# Patient Record
Sex: FEMALE | Race: WHITE | Employment: FULL TIME | URBAN - METROPOLITAN AREA
[De-identification: names, ages, dates, MRNs, and addresses within clinical notes are randomized per-mention and may not be internally consistent; named-entity substitution may affect disease eponyms.]

---

## 2017-01-04 ENCOUNTER — OFFICE VISIT (OUTPATIENT)
Dept: OBGYN | Facility: CLINIC | Age: 32
End: 2017-01-04
Attending: ADVANCED PRACTICE MIDWIFE
Payer: COMMERCIAL

## 2017-01-04 VITALS
DIASTOLIC BLOOD PRESSURE: 85 MMHG | SYSTOLIC BLOOD PRESSURE: 124 MMHG | WEIGHT: 162.6 LBS | HEIGHT: 63 IN | BODY MASS INDEX: 28.81 KG/M2

## 2017-01-04 DIAGNOSIS — Z30.41 ENCOUNTER FOR SURVEILLANCE OF CONTRACEPTIVE PILLS: ICD-10-CM

## 2017-01-04 DIAGNOSIS — Z12.4 SCREENING FOR MALIGNANT NEOPLASM OF CERVIX: ICD-10-CM

## 2017-01-04 DIAGNOSIS — Z00.00 ROUTINE GENERAL MEDICAL EXAMINATION AT A HEALTH CARE FACILITY: ICD-10-CM

## 2017-01-04 DIAGNOSIS — Z00.00 VISIT FOR PREVENTIVE HEALTH EXAMINATION: Primary | ICD-10-CM

## 2017-01-04 DIAGNOSIS — Z01.419 ENCOUNTER FOR GYNECOLOGICAL EXAMINATION WITHOUT ABNORMAL FINDING: ICD-10-CM

## 2017-01-04 PROCEDURE — G0145 SCR C/V CYTO,THINLAYER,RESCR: HCPCS | Performed by: ADVANCED PRACTICE MIDWIFE

## 2017-01-04 PROCEDURE — 87624 HPV HI-RISK TYP POOLED RSLT: CPT | Performed by: ADVANCED PRACTICE MIDWIFE

## 2017-01-04 PROCEDURE — G0476 HPV COMBO ASSAY CA SCREEN: HCPCS | Performed by: ADVANCED PRACTICE MIDWIFE

## 2017-01-04 PROCEDURE — 99211 OFF/OP EST MAY X REQ PHY/QHP: CPT | Mod: ZF

## 2017-01-04 RX ORDER — NORGESTIMATE AND ETHINYL ESTRADIOL 0.25-0.035
1 KIT ORAL DAILY
Qty: 90 TABLET | Refills: 3 | Status: SHIPPED | OUTPATIENT
Start: 2017-01-04 | End: 2017-04-19

## 2017-01-04 ASSESSMENT — ENCOUNTER SYMPTOMS
DECREASED LIBIDO: 0
ORTHOPNEA: 0
RECTAL BLEEDING: 0
PANIC: 0
POLYPHAGIA: 0
NAIL CHANGES: 0
SPUTUM PRODUCTION: 0
BOWEL INCONTINENCE: 0
WEIGHT GAIN: 0
BRUISES/BLEEDS EASILY: 0
EYE REDNESS: 0
HALLUCINATIONS: 0
LOSS OF CONSCIOUSNESS: 0
WHEEZING: 0
JAUNDICE: 0
NECK MASS: 0
HEADACHES: 0
TINGLING: 0
WEIGHT LOSS: 0
EYE PAIN: 0
ARTHRALGIAS: 0
CLAUDICATION: 0
EXERCISE INTOLERANCE: 0
BLOATING: 0
DECREASED APPETITE: 0
LEG PAIN: 0
PARALYSIS: 0
HOT FLASHES: 0
DYSPNEA ON EXERTION: 0
HEMOPTYSIS: 0
SPEECH CHANGE: 0
MEMORY LOSS: 0
LEG SWELLING: 0
POOR WOUND HEALING: 0
ABDOMINAL PAIN: 0
EYE IRRITATION: 0
COUGH DISTURBING SLEEP: 0
RECTAL PAIN: 0
ALTERED TEMPERATURE REGULATION: 0
DISTURBANCES IN COORDINATION: 0
SNORES LOUDLY: 0
COUGH: 0
DYSURIA: 0
DIARRHEA: 0
DOUBLE VISION: 0
FATIGUE: 0
POLYDIPSIA: 0
SINUS PAIN: 0
INSOMNIA: 0
HOARSE VOICE: 0
MUSCLE CRAMPS: 0
RESPIRATORY PAIN: 0
NUMBNESS: 0
NERVOUS/ANXIOUS: 0
PALPITATIONS: 0
BACK PAIN: 0
DIZZINESS: 0
BREAST PAIN: 0
SINUS CONGESTION: 0
SORE THROAT: 0
DEPRESSION: 0
CONSTIPATION: 0
SLEEP DISTURBANCES DUE TO BREATHING: 0
NECK PAIN: 0
MYALGIAS: 0
CHILLS: 0
NIGHT SWEATS: 0
FEVER: 0
NAUSEA: 0
SEIZURES: 0
LIGHT-HEADEDNESS: 0
INCREASED ENERGY: 0
TACHYCARDIA: 0
TASTE DISTURBANCE: 0
BLOOD IN STOOL: 0
HEMATURIA: 0
WEAKNESS: 0
STIFFNESS: 0
SKIN CHANGES: 0
TROUBLE SWALLOWING: 0
VOMITING: 0
MUSCLE WEAKNESS: 0
BREAST MASS: 0
EXTREMITY NUMBNESS: 0
SMELL DISTURBANCE: 0
JOINT SWELLING: 0
DECREASED CONCENTRATION: 0
POSTURAL DYSPNEA: 0
SWOLLEN GLANDS: 0
TREMORS: 0
HYPOTENSION: 0
HYPERTENSION: 0
DIFFICULTY URINATING: 0
EYE WATERING: 0
HEARTBURN: 0
SYNCOPE: 0
FLANK PAIN: 0
SHORTNESS OF BREATH: 0

## 2017-01-04 ASSESSMENT — PAIN SCALES - GENERAL: PAINLEVEL: NO PAIN (0)

## 2017-01-04 NOTE — NURSING NOTE
Chief Complaint   Patient presents with     Annual Visit   annual exam  And needs refill bcp today  Will fill downstairs.

## 2017-01-04 NOTE — Clinical Note
2017       RE: Vianca Dumas  [address]  [place] [address]     Dear Colleague,    Thank you for referring your patient, Vianca Dumas, to the [facility] at [facility]. Please see a copy of my visit note below.        Progress Note    SUBJECTIVE:  Vianca Dumas is an 31 year old, , who requests an Annual Preventive Exam.       Concerns today include: preconception    Has ruptured ear drum, was evaluated at urgent care, pt feels it is improving.    Menstrual History:  Menstrual History 2014   LAST MENSTRUAL PERIOD 2014 - 2016   Menarche age - - -   Period Cycle (Days) - 28-36, skipped one menses -   Period Duration (Days) - 7-10 -   Method of Contraception - Combined oral contraceptive -   Period Pattern - Irregular -   Menstrual Flow - Light -   Menstrual Control - Other (Comment) -   Dysmenorrhea - Moderate -   PMS Symptoms - Cramping;Mood Changes -   Reviewed Today - Yes -       Last  PAP      NIL   10/1/2013  History of abnormal Pap smear: NO - age 30-65 PAP every 5 years with negative HPV co-testing recommended    Last No results found for this basename: HPV16  Last No results found for this basename: hpv18  Last No results found for this basename: hrhpv    Mammogram current: not applicable  Last Mammogram:   No results found.     Last Colonoscopy:  No results found for this or any previous visit.      HISTORY:  Prescription Medications as of 2017             norgestimate-ethinyl estradiol (ORTHO-CYCLEN, SPRINTEC) 0.25-35 MG-MCG per tablet Take 1 tablet by mouth daily    HERBALS         No Known Allergies  Immunization History   Administered Date(s) Administered     Hepatitis B 1997, 1998, 2002     Influenza (IIV3) 2013     Meningococcal (Menomune ) 2003     OPV 1984, 1984, 1985, 10/06/1988     TDAP (BOOSTRIX AGES 10-64)  2016     Tdap (Adacel,Boostrix) 2006       Obstetric History       T0      TAB0   SAB0   E0   M0   L0      Past Medical History   Diagnosis Date     Allergic rhinitis      Ruptured ear drum, right 2016     Past Surgical History   Procedure Laterality Date     Tonsillectomy       2002     Family History   Problem Relation Age of Onset     Thyroid Disease Maternal Grandmother      DANIEL Maternal Grandfather      Social History     Social History     Marital Status: Single     Spouse Name: N/A     Number of Children: N/A     Years of Education: college     Occupational History      [employer]     Social History Main Topics     Smoking status: Former Smoker -- 0.50 packs/day     Types: Cigarettes     Start date: 2016     Smokeless tobacco: Never Used     Alcohol Use: 0.6 - 1.8 oz/week     1-3 Standard drinks or equivalent per week     Drug Use: No     Sexual Activity:     Partners: Male     Birth Control/ Protection: Condom, OCP      Comment: monogamous relationship since May 2013     Other Topics Concern     Not on file     Social History Narrative    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     How much exercise per week? Not much daily    How much calcium per day? 2-3 servings       How much caffeine per day? 1 cup coffee    How much vitamin D per day? 0  occ supplements    Do you/your family wear seatbelts?  Yes    Do you/your family use safety helmets? N/A    Do you/your family use sunscreen? Yes    Do you/your family keep firearms in the home? No    Do you/your family have a smoke detector(s)? Yes        Do you feel safe in your home? Yes    Has anyone ever touched you in an unwanted manner? No     Explain         2014 Ravinder Kuhn LPN    Reviewed [email or web address] lpn 2017                Review of Systems     Constitutional:  Negative for fever, chills, weight loss, weight gain, fatigue, decreased appetite, night  "sweats, recent stressors, height gain, height loss, post-operative complications, incisional pain, hallucinations, increased energy, hyperactivity and confused.   HENT:  Positive for ear pain (not so much pain as \"stuffy\" feeling). Negative for hearing loss, tinnitus, nosebleeds, trouble swallowing, hoarse voice, mouth sores, sore throat, ear discharge, tooth pain, gum tenderness, taste disturbance, smell disturbance, hearing aid, bleeding gums, dry mouth, sinus pain, sinus congestion and neck mass.    Eyes:  Negative for double vision, pain, redness, eye pain, decreased vision, eye watering, eye bulging, eye dryness, flashing lights, spots, floaters, strabismus, tunnel vision, jaundice and eye irritation.   Respiratory:   Negative for cough, hemoptysis, sputum production, shortness of breath, wheezing, sleep disturbances due to breathing, snores loudly, respiratory pain, dyspnea on exertion, cough disturbing sleep and postural dyspnea.    Cardiovascular:  Negative for chest pain, dyspnea on exertion, palpitations, orthopnea, claudication, leg swelling, fingers/toes turn blue, hypertension, hypotension, syncope, history of heart murmur, chest pain on exertion, chest pain at rest, pacemaker, few scattered varicosities, leg pain, sleep disturbances due to breathing, tachycardia, light-headedness, exercise intolerance and edema.   Gastrointestinal:  Negative for heartburn, nausea, vomiting, abdominal pain, diarrhea, constipation, blood in stool, melena, rectal pain, bloating, hemorrhoids, bowel incontinence, jaundice, rectal bleeding, coffee ground emesis and change in stool.   Genitourinary:  Negative for bladder incontinence, dysuria, urgency, hematuria, flank pain, vaginal discharge, difficulty urinating, genital sores, dyspareunia, decreased libido, nocturia, voiding less frequently, arousal difficulty, abnormal vaginal bleeding, excessive menstruation, menstrual changes, hot flashes, vaginal dryness and " "postmenopausal bleeding.   Musculoskeletal:  Negative for myalgias, back pain, joint swelling, arthralgias, stiffness, muscle cramps, neck pain, bone pain, muscle weakness and fracture.   Skin:  Negative for nail changes, itching, poor wound healing, rash, hair changes, skin changes, acne, warts, poor wound healing, scarring, flaky skin, Raynaud's phenomenon, sensitivity to sunlight and skin thickening.   Neurological:  Negative for dizziness, tingling, tremors, speech change, seizures, loss of consciousness, weakness, light-headedness, numbness, headaches, disturbances in coordination, extremity numbness, memory loss, difficulty walking and paralysis.   Endo/Heme:  Negative for anemia, swollen glands and bruises/bleeds easily.   Psychiatric/Behavioral:  Negative for depression, hallucinations, memory loss, decreased concentration, mood swings and panic attacks.    Breast:  Negative for breast discharge, breast mass, breast pain and nipple retraction.   Endocrine:  Negative for altered temperature regulation, polyphagia, polydipsia, unwanted hair growth and change in facial hair.      EXAM:  Blood pressure 124/85, height 1.6 m (5' 3\"), weight 73.755 kg (162 lb 9.6 oz), last menstrual period 12/29/2016. Body mass index is 28.81 kg/(m^2).  General - pleasant female in no acute distress.  Skin - no suspicious lesions or rashes  EENT-  PERRLA, euthyroid with out palpable nodules  Neck - supple without lymphadenopathy.  Lungs - clear to auscultation bilaterally.  Heart - regular rate and rhythm without murmur.  Abdomen - soft, nontender, nondistended, no masses or organomegaly noted.  Musculoskeletal - no gross deformities.  Neurological - normal strength, sensation, and mental status.    Breast Exam:  Breast: Without visible skin changes. No dimpling or lesions seen.   Breasts supple, non-tender with palpation, no dominant mass, nodularity, or nipple discharge noted bilaterally. Axillary nodes negative.      Pelvic " Exam:  EG/BUS: Normal genital architecture without lesions, erythema or abnormal secretions Bartholin's, Urethra, Redwood's normal   Urethral meatus: normal   Urethra: no masses, tenderness, or scarring   Bladder: no masses or tenderness   Vagina: moist, pink, rugae with creamy, white and odorless  secretions  Cervix: pink, moist, closed, without lesion or CMT  Uterus: anteverted,  and small, smooth, firm, mobile w/o pain  Adnexa: Within normal limits and No masses, nodularity, tenderness  Rectum:anus normal       ASSESSMENT:  Encounter Diagnoses   Name Primary?     Routine general medical examination at a health care facility Yes     Encounter for surveillance of contraceptive pills      Visit for preventive health examination      Screening for malignant neoplasm of cervix      Encounter for gynecological examination without abnormal finding [Z01.419]         PLAN:   Orders Placed This Encounter   Procedures     Pelvic and Breast Exam Procedure []     Pap Smear Exam [] Do Not Remove     Pap imaged thin layer screen with HPV - recommended age 30 - 65 years (select HPV order below)     HPV High Risk Types DNA Cervical      Rx for birth control refilled.   Additional teaching done at this visit regarding calcium (1200 mg per day), exercise and preconception.  Encouraged folic acid, no alcohol/drugs/smoking, exercise, weight management.  If no pregnancy in 1 year of trying, RTC for infertility work up.  Declines STI testing.   Refer to primary care for rupture ear drum follow-up.     Return to clinic in one year.  Follow-up as needed.    Agrees to communication with Shankar.         Again, thank you for allowing me to participate in the care of your patient.      Sincerely,    AMANDA Barnes CNM

## 2017-01-04 NOTE — PATIENT INSTRUCTIONS
PREVENTIVE HEALTH RECOMMENDATIONS:     Preconception Health:    Take 0.4 mg Folic acid daily and 1000 to 1200 mg of Calcium daily.     Avoid alcohol, smoking, street drugs.    Maintain a healthy BMI and exercise at least 30 minutes daily.    Protect yourself from sexually transmitted infections.     Review any new medication, supplements with your health care provider.     Keep your immunizations up to date.  Remember to have yearly flu shots.    Know your medical history and your family history    Your male partner's health is important as well. Visit: http://www.cdc.gov/preconception/men.html    For more information on planning for a healthy pregnancy, visit:  http://www.cdc.gov/preconception/planning.html    Most women need a yearly breast and pelvic exam.    A PAP screen, a test done DURING a pelvic exam, is NO longer recommended yearly.    March 2013, screening guidelines recommended by ACOG for PAP screen are:    1) First pap at age 21.    2) Pap every 3 years until age 30.    3) After age 30, pap every 3 years or Pap with HR HPV screen every 5 years until age 65.  4) Women do NOT need a vaginal Pap screen after a hysterectomy (surgical removal of the uterus) when they have not had cancer.    Exceptions:  1) Yearly pap if HIV+ or immunosuppressed secondary to organ transplant  2) HELADIO II-III continue routine screening for 20 years.    I encourage you continue looking for opportunities to choose a healthy lifestyle:       * Choose to eat a heart healthy diet. Check out the FOOD PLATE guidelines at: http://www.choosemyplate.gov/ for helpful hints on weight and cholesterol management.  Balance your caloric intake with exercise to maintain a BMI in the 22 to 26 range. For bone health: Eat calcium-rich foods like yogurt, broccoli or take chewable calcium pills (500 to 600 mg) twice a day with food.       * Exercise for at least an average of 30 minutes a day, 5 days of the week. This will help you control your  weight, release stress, and help prevent disease.      * Take a Vitamin D3 supplement daily fall through spring and during summer unless you yyss75-72' full body sun exposure to skin without sunscreen.      * DO wear sunscreen to prevent skin cancer after the first 15-30 minutes.      * Identify stressors in your life, find ways to release the stress, and, make time for yourself. PLEASE ask for help if mood changes last longer than two weeks.     * Limit alcohol to one drink per day.  No smoking.  Avoid second hand smoke. If you smoke, ask for help to stop.       *  If you are in a sexual relationship, talk with your partner about possible infection risks and take action to protect yourself from exposure to a sexual infection.    Please request an infection screen for STIs (sexually transmitted infections) if you are less than age 26 OR believe that you may be at risk.     Get a flu shot each year. Get a tetanus shot every 10 years. EVERYONE needs a pertussis (Whooping cough) booster.    See your dentist twice a year for an exam and preventive care cleaning.     Consider the following screen tests:    1) cholesterol test every 5 years.     2) yearly mammogram after age 40 unless you have identified risks.    3) colonoscopy every 10 years after age 50 unless you have identified risks.    4) diabetes blood test screening if you are at risk for diabetes.      Additional information that you may also find helpful:  The Internet now gives us access to LOTS of information -- some of it helpful, research documented and also plenty of harmful, anecdotal information that may not pertain to your situtaion. Consider visiting the following websites for accurate health information:    www.vitamindcouncil.org/ : Info and ongoing research re Vitamin D    www.fairview.org : Up to date and easily searchable information on multiple topics.    www.medlineplus.gov : medication info, interactive tutorials, watch real surgeries  online    www.cdc.gov : public health info, travel advisories, epidemics (H1N1)    www.luis/std.org: current research re diagnosis, treatment and prevention of sexually contacted infections.    www.health.[facility].[place] : [place] dept of heatl, public health issues in [place], N1N1    www.familydoctor.org : good info from the Academy of Family Physicians

## 2017-01-04 NOTE — PROGRESS NOTES
Progress Note    SUBJECTIVE:  Vianca Dumas is an 31 year old, , who requests an Annual Preventive Exam.       Concerns today include: preconception    Has ruptured ear drum, was evaluated at urgent care, pt feels it is improving.    Menstrual History:  Menstrual History 2014   LAST MENSTRUAL PERIOD 2014 - 2016   Menarche age - - -   Period Cycle (Days) - 28-36, skipped one menses -   Period Duration (Days) - 7-10 -   Method of Contraception - Combined oral contraceptive -   Period Pattern - Irregular -   Menstrual Flow - Light -   Menstrual Control - Other (Comment) -   Dysmenorrhea - Moderate -   PMS Symptoms - Cramping;Mood Changes -   Reviewed Today - Yes -       Last  PAP      NIL   10/1/2013  History of abnormal Pap smear: NO - age 30-65 PAP every 5 years with negative HPV co-testing recommended    Last No results found for this basename: HPV16  Last No results found for this basename: hpv18  Last No results found for this basename: hrhpv    Mammogram current: not applicable  Last Mammogram:   No results found.     Last Colonoscopy:  No results found for this or any previous visit.      HISTORY:  Prescription Medications as of 2017             norgestimate-ethinyl estradiol (ORTHO-CYCLEN, SPRINTEC) 0.25-35 MG-MCG per tablet Take 1 tablet by mouth daily    HERBALS         No Known Allergies  Immunization History   Administered Date(s) Administered     Hepatitis B 1997, 1998, 2002     Influenza (IIV3) 2013     Meningococcal (Menomune ) 2003     OPV 1984, 1984, 1985, 10/06/1988     TDAP (BOOSTRIX AGES 10-64) 2016     Tdap (Adacel,Boostrix) 2006       Obstetric History       T0      TAB0   SAB0   E0   M0   L0      Past Medical History   Diagnosis Date     Allergic rhinitis      Ruptured ear drum, right 2016     Past Surgical History   Procedure Laterality Date     Tonsillectomy        "6/2002     Family History   Problem Relation Age of Onset     Thyroid Disease Maternal Grandmother      DANIEL Maternal Grandfather      Social History     Social History     Marital Status: Single     Spouse Name: N/A     Number of Children: N/A     Years of Education: college     Occupational History      [employer]     Social History Main Topics     Smoking status: Former Smoker -- 0.50 packs/day     Types: Cigarettes     Start date: 12/29/2016     Smokeless tobacco: Never Used     Alcohol Use: 0.6 - 1.8 oz/week     1-3 Standard drinks or equivalent per week     Drug Use: No     Sexual Activity:     Partners: Male     Birth Control/ Protection: Condom, OCP      Comment: monogamous relationship since May 2013     Other Topics Concern     Not on file     Social History Narrative    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     How much exercise per week? Not much daily    How much calcium per day? 2-3 servings       How much caffeine per day? 1 cup coffee    How much vitamin D per day? 0  occ supplements    Do you/your family wear seatbelts?  Yes    Do you/your family use safety helmets? N/A    Do you/your family use sunscreen? Yes    Do you/your family keep firearms in the home? No    Do you/your family have a smoke detector(s)? Yes        Do you feel safe in your home? Yes    Has anyone ever touched you in an unwanted manner? No     Explain         December 9, 2014 Ravinder Kuhn LPN    Reviewed [email or web address] lpn 1-4-2017                Review of Systems     Constitutional:  Negative for fever, chills, weight loss, weight gain, fatigue, decreased appetite, night sweats, recent stressors, height gain, height loss, post-operative complications, incisional pain, hallucinations, increased energy, hyperactivity and confused.   HENT:  Positive for ear pain (not so much pain as \"stuffy\" feeling). Negative for hearing loss, tinnitus, nosebleeds, trouble swallowing, " hoarse voice, mouth sores, sore throat, ear discharge, tooth pain, gum tenderness, taste disturbance, smell disturbance, hearing aid, bleeding gums, dry mouth, sinus pain, sinus congestion and neck mass.    Eyes:  Negative for double vision, pain, redness, eye pain, decreased vision, eye watering, eye bulging, eye dryness, flashing lights, spots, floaters, strabismus, tunnel vision, jaundice and eye irritation.   Respiratory:   Negative for cough, hemoptysis, sputum production, shortness of breath, wheezing, sleep disturbances due to breathing, snores loudly, respiratory pain, dyspnea on exertion, cough disturbing sleep and postural dyspnea.    Cardiovascular:  Negative for chest pain, dyspnea on exertion, palpitations, orthopnea, claudication, leg swelling, fingers/toes turn blue, hypertension, hypotension, syncope, history of heart murmur, chest pain on exertion, chest pain at rest, pacemaker, few scattered varicosities, leg pain, sleep disturbances due to breathing, tachycardia, light-headedness, exercise intolerance and edema.   Gastrointestinal:  Negative for heartburn, nausea, vomiting, abdominal pain, diarrhea, constipation, blood in stool, melena, rectal pain, bloating, hemorrhoids, bowel incontinence, jaundice, rectal bleeding, coffee ground emesis and change in stool.   Genitourinary:  Negative for bladder incontinence, dysuria, urgency, hematuria, flank pain, vaginal discharge, difficulty urinating, genital sores, dyspareunia, decreased libido, nocturia, voiding less frequently, arousal difficulty, abnormal vaginal bleeding, excessive menstruation, menstrual changes, hot flashes, vaginal dryness and postmenopausal bleeding.   Musculoskeletal:  Negative for myalgias, back pain, joint swelling, arthralgias, stiffness, muscle cramps, neck pain, bone pain, muscle weakness and fracture.   Skin:  Negative for nail changes, itching, poor wound healing, rash, hair changes, skin changes, acne, warts, poor wound  "healing, scarring, flaky skin, Raynaud's phenomenon, sensitivity to sunlight and skin thickening.   Neurological:  Negative for dizziness, tingling, tremors, speech change, seizures, loss of consciousness, weakness, light-headedness, numbness, headaches, disturbances in coordination, extremity numbness, memory loss, difficulty walking and paralysis.   Endo/Heme:  Negative for anemia, swollen glands and bruises/bleeds easily.   Psychiatric/Behavioral:  Negative for depression, hallucinations, memory loss, decreased concentration, mood swings and panic attacks.    Breast:  Negative for breast discharge, breast mass, breast pain and nipple retraction.   Endocrine:  Negative for altered temperature regulation, polyphagia, polydipsia, unwanted hair growth and change in facial hair.      EXAM:  Blood pressure 124/85, height 1.6 m (5' 3\"), weight 73.755 kg (162 lb 9.6 oz), last menstrual period 12/29/2016. Body mass index is 28.81 kg/(m^2).  General - pleasant female in no acute distress.  Skin - no suspicious lesions or rashes  EENT-  PERRLA, euthyroid with out palpable nodules  Neck - supple without lymphadenopathy.  Lungs - clear to auscultation bilaterally.  Heart - regular rate and rhythm without murmur.  Abdomen - soft, nontender, nondistended, no masses or organomegaly noted.  Musculoskeletal - no gross deformities.  Neurological - normal strength, sensation, and mental status.    Breast Exam:  Breast: Without visible skin changes. No dimpling or lesions seen.   Breasts supple, non-tender with palpation, no dominant mass, nodularity, or nipple discharge noted bilaterally. Axillary nodes negative.      Pelvic Exam:  EG/BUS: Normal genital architecture without lesions, erythema or abnormal secretions Bartholin's, Urethra, Eaton's normal   Urethral meatus: normal   Urethra: no masses, tenderness, or scarring   Bladder: no masses or tenderness   Vagina: moist, pink, rugae with creamy, white and odorless  " secretions  Cervix: pink, moist, closed, without lesion or CMT  Uterus: anteverted,  and small, smooth, firm, mobile w/o pain  Adnexa: Within normal limits and No masses, nodularity, tenderness  Rectum:anus normal       ASSESSMENT:  Encounter Diagnoses   Name Primary?     Routine general medical examination at a health care facility Yes     Encounter for surveillance of contraceptive pills      Visit for preventive health examination      Screening for malignant neoplasm of cervix      Encounter for gynecological examination without abnormal finding [Z01.419]         PLAN:   Orders Placed This Encounter   Procedures     Pelvic and Breast Exam Procedure []     Pap Smear Exam [] Do Not Remove     Pap imaged thin layer screen with HPV - recommended age 30 - 65 years (select HPV order below)     HPV High Risk Types DNA Cervical      Rx for birth control refilled.   Additional teaching done at this visit regarding calcium (1200 mg per day), exercise and preconception.  Encouraged folic acid, no alcohol/drugs/smoking, exercise, weight management.  If no pregnancy in 1 year of trying, RTC for infertility work up.  Declines STI testing.   Refer to primary care for rupture ear drum follow-up.     Return to clinic in one year.  Follow-up as needed.    Agrees to communication with Shankar.

## 2017-01-04 NOTE — MR AVS SNAPSHOT
After Visit Summary   1/4/2017    Vianca Dumas    MRN: [id number]           Patient Information     Date Of Birth          1985        Visit Information        Provider Department      1/4/2017 9:30 AM Mary Flores APRN CNM [facility]        Today's Diagnoses     Routine general medical examination at a health care facility    -  1     Encounter for surveillance of contraceptive pills         Visit for preventive health examination         Screening for malignant neoplasm of cervix         Encounter for gynecological examination without abnormal finding [Z01.419]           Care Instructions      PREVENTIVE HEALTH RECOMMENDATIONS:     Preconception Health:    Take 0.4 mg Folic acid daily and 1000 to 1200 mg of Calcium daily.     Avoid alcohol, smoking, street drugs.    Maintain a healthy BMI and exercise at least 30 minutes daily.    Protect yourself from sexually transmitted infections.     Review any new medication, supplements with your health care provider.     Keep your immunizations up to date.  Remember to have yearly flu shots.    Know your medical history and your family history    Your male partner's health is important as well. Visit: http://www.cdc.gov/preconception/men.html    For more information on planning for a healthy pregnancy, visit:  http://www.cdc.gov/preconception/planning.html    Most women need a yearly breast and pelvic exam.    A PAP screen, a test done DURING a pelvic exam, is NO longer recommended yearly.    March 2013, screening guidelines recommended by ACOG for PAP screen are:    1) First pap at age 21.    2) Pap every 3 years until age 30.    3) After age 30, pap every 3 years or Pap with HR HPV screen every 5 years until age 65.  4) Women do NOT need a vaginal Pap screen after a hysterectomy (surgical removal of the uterus) when they have not had cancer.    Exceptions:  1) Yearly pap if HIV+ or immunosuppressed secondary to  organ transplant  2) HELADIO II-III continue routine screening for 20 years.    I encourage you continue looking for opportunities to choose a healthy lifestyle:       * Choose to eat a heart healthy diet. Check out the FOOD PLATE guidelines at: http://www.choosemyplate.gov/ for helpful hints on weight and cholesterol management.  Balance your caloric intake with exercise to maintain a BMI in the 22 to 26 range. For bone health: Eat calcium-rich foods like yogurt, broccoli or take chewable calcium pills (500 to 600 mg) twice a day with food.       * Exercise for at least an average of 30 minutes a day, 5 days of the week. This will help you control your weight, release stress, and help prevent disease.      * Take a Vitamin D3 supplement daily fall through spring and during summer unless you [id number]' full body sun exposure to skin without sunscreen.      * DO wear sunscreen to prevent skin cancer after the first 15-30 minutes.      * Identify stressors in your life, find ways to release the stress, and, make time for yourself. PLEASE ask for help if mood changes last longer than two weeks.     * Limit alcohol to one drink per day.  No smoking.  Avoid second hand smoke. If you smoke, ask for help to stop.       *  If you are in a sexual relationship, talk with your partner about possible infection risks and take action to protect yourself from exposure to a sexual infection.    Please request an infection screen for STIs (sexually transmitted infections) if you are less than age 26 OR believe that you may be at risk.     Get a flu shot each year. Get a tetanus shot every 10 years. EVERYONE needs a pertussis (Whooping cough) booster.    See your dentist twice a year for an exam and preventive care cleaning.     Consider the following screen tests:    1) cholesterol test every 5 years.     2) yearly mammogram after age 40 unless you have identified risks.    3) colonoscopy every 10 years after age 50 unless you have  identified risks.    4) diabetes blood test screening if you are at risk for diabetes.      Additional information that you may also find helpful:  The Internet now gives us access to LOTS of information -- some of it helpful, research documented and also plenty of harmful, anecdotal information that may not pertain to your situtaion. Consider visiting the following websites for accurate health information:    www.vitamindcouncil.org/ : Info and ongoing research re Vitamin D    www.fairview.org : Up to date and easily searchable information on multiple topics.    www.medlineplus.gov : medication info, interactive tutorials, watch real surgeries online    www.cdc.gov : public health info, travel advisories, epidemics (H1N1)    www.luis/std.org: current research re diagnosis, treatment and prevention of sexually contacted infections.    www.health.state.[place] : [place] dept of heat, public health issues in [place], N1N1    www.familydoctor.org : good info from the Academy of Family Physicians              Follow-ups after your visit        Follow-up notes from your care team     Return in 1 year (on 1/4/2018) for Preventative Health Visit.      Who to contact     Please call your clinic at [phone] to:    Ask questions about your health    Make or cancel appointments    Discuss your medicines    Learn about your test results    Speak to your doctor   If you have compliments or concerns about an experience at your clinic, or if you wish to file a complaint, please contact [facility] Patient Relations at [phone] or email us at [email or web address]         Additional Information About Your Visit        MyChart Information     [employer] gives you secure access to your electronic health record. If you see a primary care provider, you can also send messages to your care team and make appointments. If you have questions, please call your primary care clinic.  If you do not have a primary  "care provider, please call [phone] and they will assist you.      [employer] is an electronic gateway that provides easy, online access to your medical records. With [employer], you can request a clinic appointment, read your test results, renew a prescription or communicate with your care team.     To access your existing account, please contact your [facility] or call [phone] for assistance.        Care EveryWhere ID     This is your Care EveryWhere ID. This could be used by other organizations to access your [facility] medical records  [id number]        Your Vitals Were     Height BMI (Body Mass Index) Last Period             1.6 m (5' 3\") 28.81 kg/m2 12/29/2016          Blood Pressure from Last 3 Encounters:   01/04/17 124/85   12/09/14 126/79   10/22/13 134/79    Weight from Last 3 Encounters:   01/04/17 73.755 kg (162 lb 9.6 oz)   12/09/14 69.083 kg (152 lb 4.8 oz)   10/22/13 61.644 kg (135 lb 14.4 oz)              We Performed the Following     HPV High Risk Types DNA Cervical     Pap imaged thin layer screen with HPV - recommended age 30 - 65 years (select HPV order below)     Pap Smear Exam [] Do Not Remove     Pelvic and Breast Exam Procedure []          Where to get your medicines      These medications were sent to [facility] [place], [place] - [address]  [address] [facility] [address], [facility] [address]     Phone:  [phone]    - norgestimate-ethinyl estradiol 0.25-35 MG-MCG per tablet       Primary Care Provider    None       No address on file        Thank you!     Thank you for choosing [facility]  for your care. Our goal is always to provide you with excellent care. Hearing back from our patients is one way we can continue to improve our services. Please take a few minutes to complete the written survey that you may receive in the mail after your visit with us. Thank you!             Your Updated Medication List " - Protect others around you: Learn how to safely use, store and throw away your medicines at www.disposemymeds.org.          This list is accurate as of: 1/4/17 10:04 AM.  Always use your most recent med list.                   Brand Name Dispense Instructions for use    HERBALS          norgestimate-ethinyl estradiol 0.25-35 MG-MCG per tablet    ORTHO-CYCLEN, SPRINTEC    90 tablet    Take 1 tablet by mouth daily

## 2017-01-05 ASSESSMENT — PATIENT HEALTH QUESTIONNAIRE - PHQ9: SUM OF ALL RESPONSES TO PHQ QUESTIONS 1-9: 1

## 2017-01-06 LAB
COPATH REPORT: NORMAL
PAP: NORMAL

## 2017-01-09 ENCOUNTER — MYC MEDICAL ADVICE (OUTPATIENT)
Dept: OBGYN | Facility: CLINIC | Age: 32
End: 2017-01-09

## 2017-01-09 DIAGNOSIS — B37.31 CANDIDIASIS OF VAGINA: Primary | ICD-10-CM

## 2017-01-09 LAB
FINAL DIAGNOSIS: NORMAL
HPV HR 12 DNA CVX QL NAA+PROBE: NEGATIVE
HPV16 DNA SPEC QL NAA+PROBE: NEGATIVE
HPV18 DNA SPEC QL NAA+PROBE: NEGATIVE
SPECIMEN DESCRIPTION: NORMAL

## 2017-01-10 NOTE — TELEPHONE ENCOUNTER
From: Vianca Dumas  To: Mary Flores APRN CNM  Sent: 1/9/2017 4:23 PM CST  Subject: Yeast Infection?    My pap showed that I have a yeast infection but I am not experiencing any symptoms at this time, does it need to be treated? If so, is it possible to send the prescription for the pill to a pharmacy in [place]?

## 2017-01-19 ENCOUNTER — MYC MEDICAL ADVICE (OUTPATIENT)
Dept: OBGYN | Facility: CLINIC | Age: 32
End: 2017-01-19

## 2017-01-19 DIAGNOSIS — B37.2 YEAST INFECTION OF THE SKIN: Primary | ICD-10-CM

## 2017-01-20 RX ORDER — FLUCONAZOLE 150 MG/1
150 TABLET ORAL ONCE
Qty: 1 TABLET | Refills: 0 | Status: SHIPPED | OUTPATIENT
Start: 2017-01-20 | End: 2017-01-20

## 2017-03-14 ENCOUNTER — OFFICE VISIT (OUTPATIENT)
Dept: OBGYN | Facility: CLINIC | Age: 32
End: 2017-03-14
Attending: ADVANCED PRACTICE MIDWIFE
Payer: COMMERCIAL

## 2017-03-14 VITALS
WEIGHT: 166.3 LBS | SYSTOLIC BLOOD PRESSURE: 122 MMHG | HEIGHT: 63 IN | DIASTOLIC BLOOD PRESSURE: 82 MMHG | BODY MASS INDEX: 29.46 KG/M2

## 2017-03-14 DIAGNOSIS — N89.8 VAGINAL DISCHARGE: Primary | ICD-10-CM

## 2017-03-14 DIAGNOSIS — R30.0 DYSURIA: ICD-10-CM

## 2017-03-14 LAB
APPEARANCE UR: CLEAR
BILIRUB UR QL: NORMAL
CLUE CELLS: NORMAL
COLOR UR: YELLOW
GLUCOSE URINE: NORMAL MG/DL
HGB UR QL: NORMAL
KETONES UR QL: NORMAL MG/DL
LEUKOCYTE ESTERASE URINE: NORMAL
NITRITE UR QL STRIP: NORMAL
PH UR STRIP: 7 PH (ref 5–7)
PROTEIN ALBUMIN URINE: NORMAL MG/DL
SOURCE: NORMAL
SP GR UR STRIP: 1.02 (ref 1–1.03)
TRICHOMONAS (WET PREP): NORMAL
UROBILINOGEN UR QL STRIP: 0.2 EU/DL (ref 0.2–1)
YEAST (WET PREP): NORMAL

## 2017-03-14 PROCEDURE — 87086 URINE CULTURE/COLONY COUNT: CPT | Performed by: ADVANCED PRACTICE MIDWIFE

## 2017-03-14 PROCEDURE — 87210 SMEAR WET MOUNT SALINE/INK: CPT | Mod: ZF | Performed by: ADVANCED PRACTICE MIDWIFE

## 2017-03-14 PROCEDURE — 81003 URINALYSIS AUTO W/O SCOPE: CPT | Mod: ZF | Performed by: ADVANCED PRACTICE MIDWIFE

## 2017-03-14 ASSESSMENT — PAIN SCALES - GENERAL: PAINLEVEL: NO PAIN (0)

## 2017-03-14 NOTE — NURSING NOTE
Chief Complaint   Patient presents with     RECHECK   vaginal discharge for last two weeks-  Was yellow in color    Some vaginal burning - when urinating it hurts when hits her skin-

## 2017-03-14 NOTE — LETTER
"3/14/2017       RE: Vianca Dumas  [address]  [place] [address]     Dear Colleague,    Thank you for referring your patient, Vianca Dumas, to the [facility] at [facility]. Please see a copy of my visit note below.      S: Vianca Dumas is a 32 y.o. Nulliparous female who presents for evaluation of \"vaginal symptoms.\"     Pt states that for the last 3 weeks she has noted very mild \"burning\" at the introitus of her vagina when she urinates. She is unsure if the burning is in the urethra or on the vaginal tissue but states she only notices it when she urinates. Does not feels like it is worsening- has been improving. She denies urgency, frequency, hematuria or suprapubic/bladder tenderness.     She also reports that she has noticed a slight increase in white vaginal discharge.Denies change in odor. Denies itching.      She reports that she was treated for a yeast infection in December after taking amoxicillin for a ruptured ear drum. She states that she had an annual exam in January and was treated again with Diflucan after the pap showed the presence of candida. She notes she was not having s/s of a yeast infection at that time. She states that she does not feel like she has a yeast infection or a urinary tract infection now but would like a urine culture and wet prep d/t to the continued mild irritation. Denies recent change in soaps, detergent, partners.     Declines STI testing- sexually active with one male partner.  LMP 2/23/17, 28days x 2-3 days. Taking OCPs (Ortho-tricyclen) consistently.     Last pap with cotesting 1/4/17 was NIL with negative HPV cotesting.     Review Of Systems  Skin: negative  Eyes: negative  Ears/Nose/Throat: negative  Respiratory: No shortness of breath, dyspnea on exertion, cough, or hemoptysis  Cardiovascular: negative  Gastrointestinal: negative  Genitourinary: positive for dysuria and vaginal " "discharge  Musculoskeletal: negative  Neurologic: negative  Psychiatric: negative  Hematologic/Lymphatic/Immunologic: negative  Endocrine: negative    Past Medical History   Diagnosis Date     Allergic rhinitis      Ruptured ear drum, right 2016       Past Surgical History   Procedure Laterality Date     Tonsillectomy       6/2002       Family History   Problem Relation Age of Onset     Thyroid Disease Maternal Grandmother      KATHY. Maternal Grandfather        Social History   Substance Use Topics     Smoking status: Former Smoker     Packs/day: 0.50     Types: Cigarettes     Start date: 12/29/2016     Smokeless tobacco: Never Used     Alcohol use 0.6 - 1.8 oz/week     1 - 3 Standard drinks or equivalent per week       O: /82  Ht 1.6 m (5' 2.99\")  Wt 75.4 kg (166 lb 4.8 oz)  LMP 02/23/2017  BMI 29.47 kg/m2; Afebrile    Constitutional:   Alert and oriented, well developed, in no acute distress.     Negative CVAT  No suprapubic tenderness or tenderness over bladder     Pelvic exam:   Vulva: Normal genitalia and Bartholin's, Urethra, [place]'s normal  Vagina: normal with good muscle tone, scant amount of white odorless discharge  Cervix: No discharge, bleeding or lesions. External os visually closed. Neg CMT.  Rectum: anus normal     UA: Spec gravity: 1.025; Small blood, trace protein. Ph 7.0.  Neg nitrates     Wet prep: WNL. ph 4.5, neg clue cells, hyphae, buds, trich, neg whiff test    A: Negative for vaginal infection.  Unlikely UTI, culture pending.    (N89.8) Vaginal discharge  (primary encounter diagnosis)  Plan: Wet Prep POCT   (R30.0) Dysuria  Plan: Urinalysis chemical screen POCT, Urine Culture         Aerobic Bacterial      P: Wet prep done- WNL.  UA done- reviewed spec gravity. Discussed increased PO hydration.  Urine culture sent. Follow up with pt re: results. Treat if indicated.  Discussed use of replens for irritation from vaginal dryness. Long acting water soluble lubricant with intercourse.   May " use daily probiotic. Avoid soaps, douching.  Call clinic for worsening or non-resolution of symptoms.  Next pap with hpv cotesting 1/2022.  RTC in 1 year for annual exam or prn for concerns.  Pt verbalized understanding and agreed with plan of care.     AMANDA Landa, CNM

## 2017-03-14 NOTE — MR AVS SNAPSHOT
After Visit Summary   3/14/2017    Vianca Dumas    MRN: [id number]           Patient Information     Date Of Birth          1985        Visit Information        Provider Department      3/14/2017 11:00 AM Marialuisa Moura CNM [facility]        Today's Diagnoses     Vaginal discharge    -  1    Dysuria           Follow-ups after your visit        Follow-up notes from your care team     Return in about 1 year (around 3/14/2018) for Annual exam.      Who to contact     Please call your clinic at [phone] to:    Ask questions about your health    Make or cancel appointments    Discuss your medicines    Learn about your test results    Speak to your doctor   If you have compliments or concerns about an experience at your clinic, or if you wish to file a complaint, please contact [facility] Patient Relations at [phone] or email us at [email or web address]         Additional Information About Your Visit        MyChart Information     [employer] gives you secure access to your electronic health record. If you see a primary care provider, you can also send messages to your care team and make appointments. If you have questions, please call your primary care clinic.  If you do not have a primary care provider, please call [phone] and they will assist you.      [employer] is an electronic gateway that provides easy, online access to your medical records. With [employer], you can request a clinic appointment, read your test results, renew a prescription or communicate with your care team.     To access your existing account, please contact your [facility] or call [phone] for assistance.        Care EveryWhere ID     This is your Care EveryWhere ID. This could be used by other organizations to access your [facility] medical records  [id number]        Your Vitals Were     Height Last  "Period BMI (Body Mass Index)             1.6 m (5' 2.99\") 02/23/2017 29.47 kg/m2          Blood Pressure from Last 3 Encounters:   03/14/17 122/82   01/04/17 124/85   12/09/14 126/79    Weight from Last 3 Encounters:   03/14/17 75.4 kg (166 lb 4.8 oz)   01/04/17 73.8 kg (162 lb 9.6 oz)   12/09/14 69.1 kg (152 lb 4.8 oz)              We Performed the Following     Urinalysis chemical screen POCT     Urine Culture Aerobic Bacterial     Wet Prep POCT        Primary Care Provider    None       No address on file        Thank you!     Thank you for choosing [facility]  for your care. Our goal is always to provide you with excellent care. Hearing back from our patients is one way we can continue to improve our services. Please take a few minutes to complete the written survey that you may receive in the mail after your visit with us. Thank you!             Your Updated Medication List - Protect others around you: Learn how to safely use, store and throw away your medicines at www.disposemymeds.org.          This list is accurate as of: 3/14/17 11:59 PM.  Always use your most recent med list.                   Brand Name Dispense Instructions for use    HERBALS          norgestimate-ethinyl estradiol 0.25-35 MG-MCG per tablet    ORTHO-CYCLEN, SPRINTEC    90 tablet    Take 1 tablet by mouth daily         "

## 2017-03-15 LAB
BACTERIA SPEC CULT: NO GROWTH
Lab: NORMAL
MICRO REPORT STATUS: NORMAL
SPECIMEN SOURCE: NORMAL

## 2017-03-19 NOTE — PROGRESS NOTES
"  S: Vianca Dumas is a 32 y.o. Nulliparous female who presents for evaluation of \"vaginal symptoms.\"     Pt states that for the last 3 weeks she has noted very mild \"burning\" at the introitus of her vagina when she urinates. She is unsure if the burning is in the urethra or on the vaginal tissue but states she only notices it when she urinates. Does not feels like it is worsening- has been improving. She denies urgency, frequency, hematuria or suprapubic/bladder tenderness.     She also reports that she has noticed a slight increase in white vaginal discharge.Denies change in odor. Denies itching.      She reports that she was treated for a yeast infection in December after taking amoxicillin for a ruptured ear drum. She states that she had an annual exam in January and was treated again with Diflucan after the pap showed the presence of candida. She notes she was not having s/s of a yeast infection at that time. She states that she does not feel like she has a yeast infection or a urinary tract infection now but would like a urine culture and wet prep d/t to the continued mild irritation. Denies recent change in soaps, detergent, partners.     Declines STI testing- sexually active with one male partner.  LMP 2/23/17, 28days x 2-3 days. Taking OCPs (Ortho-tricyclen) consistently.     Last pap with cotesting 1/4/17 was NIL with negative HPV cotesting.     Review Of Systems  Skin: negative  Eyes: negative  Ears/Nose/Throat: negative  Respiratory: No shortness of breath, dyspnea on exertion, cough, or hemoptysis  Cardiovascular: negative  Gastrointestinal: negative  Genitourinary: positive for dysuria and vaginal discharge  Musculoskeletal: negative  Neurologic: negative  Psychiatric: negative  Hematologic/Lymphatic/Immunologic: negative  Endocrine: negative    Past Medical History   Diagnosis Date     Allergic rhinitis      Ruptured ear drum, right 2016       Past Surgical History   Procedure Laterality Date     " "Tonsillectomy       6/2002       Family History   Problem Relation Age of Onset     Thyroid Disease Maternal Grandmother      C.AALINA. Maternal Grandfather        Social History   Substance Use Topics     Smoking status: Former Smoker     Packs/day: 0.50     Types: Cigarettes     Start date: 12/29/2016     Smokeless tobacco: Never Used     Alcohol use 0.6 - 1.8 oz/week     1 - 3 Standard drinks or equivalent per week       O: /82  Ht 1.6 m (5' 2.99\")  Wt 75.4 kg (166 lb 4.8 oz)  LMP 02/23/2017  BMI 29.47 kg/m2; Afebrile    Constitutional:   Alert and oriented, well developed, in no acute distress.     Negative CVAT  No suprapubic tenderness or tenderness over bladder     Pelvic exam:   Vulva: Normal genitalia and Bartholin's, Urethra, [place]'s normal  Vagina: normal with good muscle tone, scant amount of white odorless discharge  Cervix: No discharge, bleeding or lesions. External os visually closed. Neg CMT.  Rectum: anus normal     UA: Spec gravity: 1.025; Small blood, trace protein. Ph 7.0.  Neg nitrates     Wet prep: WNL. ph 4.5, neg clue cells, hyphae, buds, trich, neg whiff test    A: Negative for vaginal infection.  Unlikely UTI, culture pending.    (N89.8) Vaginal discharge  (primary encounter diagnosis)  Plan: Wet Prep POCT   (R30.0) Dysuria  Plan: Urinalysis chemical screen POCT, Urine Culture         Aerobic Bacterial      P: Wet prep done- WNL.  UA done- reviewed spec gravity. Discussed increased PO hydration.  Urine culture sent. Follow up with pt re: results. Treat if indicated.  Discussed use of replens for irritation from vaginal dryness. Long acting water soluble lubricant with intercourse.   May use daily probiotic. Avoid soaps, douching.  Call clinic for worsening or non-resolution of symptoms.  Next pap with hpv cotesting 1/2022.  RTC in 1 year for annual exam or prn for concerns.  Pt verbalized understanding and agreed with plan of care.     AMANDA Landa, MELITA    "

## 2017-03-27 DIAGNOSIS — Z00.00 ROUTINE GENERAL MEDICAL EXAMINATION AT A HEALTH CARE FACILITY: Primary | ICD-10-CM

## 2017-03-27 RX ORDER — NORGESTIMATE AND ETHINYL ESTRADIOL 0.25-0.035
1 KIT ORAL DAILY
Qty: 84 TABLET | Refills: 3 | Status: SHIPPED | OUTPATIENT
Start: 2017-03-27 | End: 2017-11-17

## 2017-04-19 ENCOUNTER — OFFICE VISIT (OUTPATIENT)
Dept: OBGYN | Facility: CLINIC | Age: 32
End: 2017-04-19
Attending: ADVANCED PRACTICE MIDWIFE
Payer: COMMERCIAL

## 2017-04-19 VITALS
DIASTOLIC BLOOD PRESSURE: 78 MMHG | BODY MASS INDEX: 30.69 KG/M2 | WEIGHT: 173.2 LBS | HEART RATE: 68 BPM | HEIGHT: 63 IN | SYSTOLIC BLOOD PRESSURE: 132 MMHG

## 2017-04-19 DIAGNOSIS — N94.89 VAGINAL BURNING: Primary | ICD-10-CM

## 2017-04-19 DIAGNOSIS — N76.0 BV (BACTERIAL VAGINOSIS): ICD-10-CM

## 2017-04-19 DIAGNOSIS — B96.89 BV (BACTERIAL VAGINOSIS): ICD-10-CM

## 2017-04-19 DIAGNOSIS — R30.0 DYSURIA: ICD-10-CM

## 2017-04-19 LAB
CLUE CELLS: NORMAL
TRICHOMONAS (WET PREP): NORMAL
YEAST (WET PREP): NORMAL

## 2017-04-19 PROCEDURE — 87102 FUNGUS ISOLATION CULTURE: CPT | Performed by: ADVANCED PRACTICE MIDWIFE

## 2017-04-19 PROCEDURE — 87086 URINE CULTURE/COLONY COUNT: CPT | Performed by: ADVANCED PRACTICE MIDWIFE

## 2017-04-19 PROCEDURE — 87591 N.GONORRHOEAE DNA AMP PROB: CPT | Mod: 59

## 2017-04-19 PROCEDURE — 40000809 ZZH STATISTIC NO DOCUMENTATION TO SUPPORT CHARGE

## 2017-04-19 PROCEDURE — 99212 OFFICE O/P EST SF 10 MIN: CPT | Mod: ZF

## 2017-04-19 PROCEDURE — 87491 CHLMYD TRACH DNA AMP PROBE: CPT

## 2017-04-19 PROCEDURE — 87210 SMEAR WET MOUNT SALINE/INK: CPT | Mod: ZF | Performed by: ADVANCED PRACTICE MIDWIFE

## 2017-04-19 PROCEDURE — 87591 N.GONORRHOEAE DNA AMP PROB: CPT | Performed by: ADVANCED PRACTICE MIDWIFE

## 2017-04-19 PROCEDURE — 87491 CHLMYD TRACH DNA AMP PROBE: CPT | Performed by: ADVANCED PRACTICE MIDWIFE

## 2017-04-19 RX ORDER — METRONIDAZOLE 7.5 MG/G
1 GEL VAGINAL AT BEDTIME
Qty: 25 G | Refills: 0 | Status: SHIPPED | OUTPATIENT
Start: 2017-04-19 | End: 2017-04-24

## 2017-04-19 RX ORDER — LORATADINE 10 MG/1
10 TABLET ORAL DAILY
COMMUNITY
End: 2018-07-19

## 2017-04-19 ASSESSMENT — PAIN SCALES - GENERAL: PAINLEVEL: NO PAIN (0)

## 2017-04-19 NOTE — MR AVS SNAPSHOT
"              After Visit Summary   4/19/2017    Vianca Dumas    MRN: [id number]           Patient Information     Date Of Birth          1985        Visit Information        Provider Department      4/19/2017 10:00 AM Caron Medley APRN CNM [facility]        Today's Diagnoses     Vaginal burning    -  1    BV (bacterial vaginosis)        Dysuria           Follow-ups after your visit        Who to contact     Please call your clinic at [phone] to:    Ask questions about your health    Make or cancel appointments    Discuss your medicines    Learn about your test results    Speak to your doctor   If you have compliments or concerns about an experience at your clinic, or if you wish to file a complaint, please contact [facility] Patient Relations at [phone] or email us at [email or web address]         Additional Information About Your Visit        MyChart Information     [employer] gives you secure access to your electronic health record. If you see a primary care provider, you can also send messages to your care team and make appointments. If you have questions, please call your primary care clinic.  If you do not have a primary care provider, please call [phone] and they will assist you.      [employer] is an electronic gateway that provides easy, online access to your medical records. With [facility], you can request a clinic appointment, read your test results, renew a prescription or communicate with your care team.     To access your existing account, please contact your [facility] or call [phone] for assistance.        Care EveryWhere ID     This is your Care EveryWhere ID. This could be used by other organizations to access your [place] medical records  [id number]        Your Vitals Were     Pulse Height Last Period BMI (Body Mass Index)          68 1.6 m (5' 3\") 03/22/2017 30.68 kg/m2   "       Blood Pressure from Last 3 Encounters:   04/19/17 132/78   03/14/17 122/82   01/04/17 124/85    Weight from Last 3 Encounters:   04/19/17 78.6 kg (173 lb 3.2 oz)   03/14/17 75.4 kg (166 lb 4.8 oz)   01/04/17 73.8 kg (162 lb 9.6 oz)              We Performed the Following     Chlamydia trachomatis PCR (Clinic Collect)     Chlamydia trachomatis PCR     Neisseria gonorrhoeae PCR     Neisseria gonorrhoeae PCR     POCT Wet Prep [WBD618]     Urine Culture Aerobic Bacterial     Yeast culture [[id number]]          Today's Medication Changes          These changes are accurate as of: 4/19/17 11:59 PM.  If you have any questions, ask your nurse or doctor.               Start taking these medicines.        Dose/Directions    metroNIDAZOLE 0.75 % vaginal gel   Commonly known as:  METROGEL   Used for:  BV (bacterial vaginosis)   Started by:  Caron Medley APRN CNM        Dose:  1 applicator   Place 1 applicator (5 g) vaginally At Bedtime for 5 days   Quantity:  25 g   Refills:  0            Where to get your medicines      These medications were sent to [facility] [address]  [address] [address]     Phone:  [phone]     metroNIDAZOLE 0.75 % vaginal gel                Primary Care Provider    None       No address on file        Thank you!     Thank you for choosing [facility]  for your care. Our goal is always to provide you with excellent care. Hearing back from our patients is one way we can continue to improve our services. Please take a few minutes to complete the written survey that you may receive in the mail after your visit with us. Thank you!             Your Updated Medication List - Protect others around you: Learn how to safely use, store and throw away your medicines at www.disposemymeds.org.          This list is accurate as of: 4/19/17 11:59 PM.  Always use your most recent med list.                   Brand Name  Dispense Instructions for use    HERBALS          loratadine 10 MG tablet    CLARITIN     Take 10 mg by mouth daily Reported on 4/19/2017       metroNIDAZOLE 0.75 % vaginal gel    METROGEL    25 g    Place 1 applicator (5 g) vaginally At Bedtime for 5 days       norgestimate-ethinyl estradiol 0.25-35 MG-MCG per tablet    ORTHO-CYCLEN, SPRINTEC    84 tablet    Take 1 tablet by mouth daily       SUDAFED PO      Reported on 4/19/2017

## 2017-04-19 NOTE — PROGRESS NOTES
"  SUBJECTIVE:  Vianca Dumas is an 32 year old  Female, , who presents with complaints of Vaginal burning.  Onset of symptoms a few months ago, relapsing/remitting since.      Predisposing factors - oral contraceptives, stress.      Last sex a few days ago, no pain, no postcoital bleeding.  Has been with same partner with no known suspected exposure to STI.  Hasn't had GC/CT recently, open to having checked \"just to be sure\".  Does not wear pad or tampon daily.  No douching.  Does not use scented soaps.  Does not wear thongs daily.     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  Feels like she's gained weight recently - all of her clothes are now fitting tightly.  Hasn't tried Replens because she had questions.  No known family h/o diabetes.    Was on different generic JUAN for x2 months.  Now back on known generic - 3 weeks into this pack.       Tearful when discussing frustration with discomfort.   Feels safe in current relationship, feels like she has family support.     Menstrual History:  Menstrual History 3/14/2017 2017 2017   LAST MENSTRUAL PERIOD 2017 3/22/2017 -   Menarche age - - 11   Period Cycle (Days) - - 28   Period Duration (Days) - - 3   Method of Contraception - - Combined oral contraceptive   Period Pattern - - Regular   Menstrual Flow - - Light   Menstrual Control - - Other (Comment)   Dysmenorrhea - - Mild   PMS Symptoms - - Cramping   Reviewed Today - - Yes       Past Medical History:   Diagnosis Date     Allergic rhinitis      Ruptured ear drum, right 2016        OBJECTIVE:   /78  Pulse 68  Ht 1.6 m (5' 3\")  Wt 78.6 kg (173 lb 3.2 oz)  LMP 2017  BMI 30.68 kg/m2     She appears well, afebrile.  Abdomen: benign, soft, nontender, no masses.  No CVA tenderness to percussion.    Pelvic Exam:  EG/BUS: Normal genitalia and Bartholin's, Urethra, [place]'s normal. Pt pointed to  No lesions.    Vagina: moist, pale, " rugae with creamy, white and odorless secretions. No lesions or bleeding.   Cervix: no lesions, pale moist, closed, without lesion or CMT  Uterus:anteverted,  and small, smooth, firm, mobile w/o pain   Adnexa:Within normal limits and No masses, nodularity, tenderness  Rectum:anus normal    POCT UA: not done. Urine culture in process  POCT Wet prep: ph 4.5  + clue cells, negative Trich, negative yeast.  + lactobacilli.   +RAFA whiff       ASSESSMENT:   Encounter Diagnoses   Name Primary?     Vaginal burning Yes     BV (bacterial vaginosis)      Dysuria       Bacterial vaginosis    PLAN:   Orders Placed This Encounter   Procedures     POCT Wet Prep [FIA807]   (N94.9) Vaginal burning  (primary encounter diagnosis)  Plan: POCT Wet Prep [HAM905], Yeast culture [DVW302],        Urine Culture Aerobic Bacterial, Neisseria         gonorrhoeae PCR, Chlamydia trachomatis PCR         (Clinic Collect), Chlamydia trachomatis PCR,         Neisseria gonorrhoeae PCR      (N76.0,  B96.89) BV (bacterial vaginosis)  Plan: metroNIDAZOLE (METROGEL) 0.75 % vaginal gel    (R30.0) Dysuria  Plan: Urine Culture Aerobic Bacterial           Orders Placed This Encounter   Medications     loratadine (CLARITIN) 10 MG tablet     Sig: Take 10 mg by mouth daily Reported on 4/19/2017     Pseudoephedrine HCl (SUDAFED PO)     Sig: Reported on 4/19/2017     metroNIDAZOLE (METROGEL) 0.75 % vaginal gel     Sig: Place 1 applicator (5 g) vaginally At Bedtime for 5 days     Dispense:  25 g     Refill:  0      - Reassured pt and encouraged self care.  Reinforced avoidance of vulvar and vaginal irritants.  Encouraged safe diet/weight loss.    - Reviewed possible recurrent BV or yeast - maintenance regimens available prn.    Consider diabetes work up if recurrent yeast infection.  - Will contact pt if yeast culture or urine culture positive by phone, otherwise will release to [employer].  - Reviewed possible correlation with long term JUAN use and vaginal  dryness/irritation.  Reinforced recommendation for Replens or Luvena OTC vaginal moisturizer - avoid other vaginal products.  Briefly discussed LARC, pt declined written information.   - Return to clinic prn if symptoms persist or worsen.      Over 50% of the 30 minute visit was spent in face to face counseling and care coordination as above.  All pt's questions discussed and answered.  Pt verbalized understanding of and agreement to plan of care.      Caron PATEL, DUSTINM

## 2017-04-19 NOTE — LETTER
"2017       RE: Vianca Dumas  [address]  [place] [address]     Dear Colleague,    Thank you for referring your patient, Vianca Dumas, to the [facility] at [facility]. Please see a copy of my visit note below.    SUBJECTIVE:  Vianca Dumas is an 32 year old  Female, , who presents with complaints of Vaginal burning.  Onset of symptoms a few months ago, relapsing/remitting since.      Predisposing factors - oral contraceptives, stress.      Last sex a few days ago, no pain, no postcoital bleeding.  Has been with same partner with no known suspected exposure to STI.  Hasn't had GC/CT recently, open to having checked \"just to be sure\".  Does not wear pad or tampon daily.  No douching.  Does not use scented soaps.  Does not wear thongs daily.     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  Feels like she's gained weight recently - all of her clothes are now fitting tightly.  Hasn't tried Replens because she had questions.  No known family h/o diabetes.    Was on different generic JUAN for x2 months.  Now back on known generic - 3 weeks into this pack.       Tearful when discussing frustration with discomfort.   Feels safe in current relationship, feels like she has family support.     Menstrual History:  Menstrual History 3/14/2017 2017 2017   LAST MENSTRUAL PERIOD 2017 3/22/2017 -   Menarche age - - 11   Period Cycle (Days) - - 28   Period Duration (Days) - - 3   Method of Contraception - - Combined oral contraceptive   Period Pattern - - Regular   Menstrual Flow - - Light   Menstrual Control - - Other (Comment)   Dysmenorrhea - - Mild   PMS Symptoms - - Cramping   Reviewed Today - - Yes       Past Medical History:   Diagnosis Date     Allergic rhinitis      Ruptured ear drum, right 2016        OBJECTIVE:   /78  Pulse 68  Ht 1.6 m (5' 3\")  Wt 78.6 " kg (173 lb 3.2 oz)  LMP 03/22/2017  BMI 30.68 kg/m2     She appears well, afebrile.  Abdomen: benign, soft, nontender, no masses.  No CVA tenderness to percussion.    Pelvic Exam:  EG/BUS: Normal genitalia and Bartholin's, Urethra, [place]'s normal. Pt pointed to  No lesions.    Vagina: moist, pale, rugae with creamy, white and odorless secretions. No lesions or bleeding.   Cervix: no lesions, pale moist, closed, without lesion or CMT  Uterus:anteverted,  and small, smooth, firm, mobile w/o pain   Adnexa:Within normal limits and No masses, nodularity, tenderness  Rectum:anus normal    POCT UA: not done. Urine culture in process  POCT Wet prep: ph 4.5  + clue cells, negative Trich, negative yeast.  + lactobacilli.   +RAAF whiff       ASSESSMENT:   Encounter Diagnoses   Name Primary?     Vaginal burning Yes     BV (bacterial vaginosis)      Dysuria       Bacterial vaginosis    PLAN:   Orders Placed This Encounter   Procedures     POCT Wet Prep [KUS416]   (N94.9) Vaginal burning  (primary encounter diagnosis)  Plan: POCT Wet Prep [YXM316], Yeast culture [RXR251],        Urine Culture Aerobic Bacterial, Neisseria         gonorrhoeae PCR, Chlamydia trachomatis PCR         (Clinic Collect), Chlamydia trachomatis PCR,         Neisseria gonorrhoeae PCR      (N76.0,  B96.89) BV (bacterial vaginosis)  Plan: metroNIDAZOLE (METROGEL) 0.75 % vaginal gel    (R30.0) Dysuria  Plan: Urine Culture Aerobic Bacterial    Orders Placed This Encounter   Medications     loratadine (CLARITIN) 10 MG tablet     Sig: Take 10 mg by mouth daily Reported on 4/19/2017     Pseudoephedrine HCl (SUDAFED PO)     Sig: Reported on 4/19/2017     metroNIDAZOLE (METROGEL) 0.75 % vaginal gel     Sig: Place 1 applicator (5 g) vaginally At Bedtime for 5 days     Dispense:  25 g     Refill:  0      - Reassured pt and encouraged self care.  Reinforced avoidance of vulvar and vaginal irritants.  Encouraged safe diet/weight loss.    - Reviewed possible recurrent  BV or yeast - maintenance regimens available prn.    Consider diabetes work up if recurrent yeast infection.  - Will contact pt if yeast culture or urine culture positive by phone, otherwise will release to [employer].  - Reviewed possible correlation with long term JUAN use and vaginal dryness/irritation.  Reinforced recommendation for Replens or Luvena OTC vaginal moisturizer - avoid other vaginal products.  Briefly discussed LARC, pt declined written information.   - Return to clinic prn if symptoms persist or worsen.    Over 50% of the 30 minute visit was spent in face to face counseling and care coordination as above.  All pt's questions discussed and answered.  Pt verbalized understanding of and agreement to plan of care.      Caron PATEL, CNM

## 2017-04-20 LAB
BACTERIA SPEC CULT: NO GROWTH
C TRACH DNA SPEC QL NAA+PROBE: NORMAL
C TRACH DNA SPEC QL NAA+PROBE: NORMAL
Lab: NORMAL
MICRO REPORT STATUS: NORMAL
N GONORRHOEA DNA SPEC QL NAA+PROBE: NORMAL
N GONORRHOEA DNA SPEC QL NAA+PROBE: NORMAL
SPECIMEN SOURCE: NORMAL

## 2017-04-24 LAB
MICRO REPORT STATUS: NORMAL
SPECIMEN SOURCE: NORMAL
YEAST SPEC QL CULT: NORMAL

## 2017-05-30 ENCOUNTER — OFFICE VISIT (OUTPATIENT)
Dept: OBGYN | Facility: CLINIC | Age: 32
End: 2017-05-30
Attending: NURSE PRACTITIONER
Payer: COMMERCIAL

## 2017-05-30 VITALS
HEIGHT: 63 IN | SYSTOLIC BLOOD PRESSURE: 117 MMHG | WEIGHT: 168.1 LBS | BODY MASS INDEX: 29.79 KG/M2 | DIASTOLIC BLOOD PRESSURE: 82 MMHG | HEART RATE: 71 BPM

## 2017-05-30 DIAGNOSIS — N94.89 VAGINAL BURNING: ICD-10-CM

## 2017-05-30 DIAGNOSIS — L30.9 VULVAR DERMATITIS: Primary | ICD-10-CM

## 2017-05-30 PROCEDURE — 87210 SMEAR WET MOUNT SALINE/INK: CPT | Mod: ZF | Performed by: NURSE PRACTITIONER

## 2017-05-30 PROCEDURE — 99212 OFFICE O/P EST SF 10 MIN: CPT | Mod: ZF

## 2017-05-30 RX ORDER — CLOBETASOL PROPIONATE 0.5 MG/G
OINTMENT TOPICAL
Qty: 45 G | Refills: 1 | Status: SHIPPED | OUTPATIENT
Start: 2017-05-30 | End: 2017-08-18

## 2017-05-30 ASSESSMENT — PAIN SCALES - GENERAL: PAINLEVEL: NO PAIN (0)

## 2017-05-30 NOTE — PROGRESS NOTES
"SUBJECTIVE:   32 year old  Female, , who complains of vaginal burning, onset in 2016.      Vianca was treated for a yeast infection with Diflucan in 2016 and 2017, with little relief of symptoms.  In March she was seen in clinic for this same symptom (vaginal burning) and had a negative wet prep.  She was encouraged to use Replens and start a daily probiotic.  Vianca did start the probiotic, but now is concerned that she has an overgrowth of lactobaccili; she stopped taking it 1 week ago.  She only used Replens for 1 day.  In April Vianca presented to clinic, again, with the same symptoms and was diagnosed with bacterial vaginosis.  She was treated with 5 nights of Metrogel, but states she never had any relief.  A yeast culture was done on 2017 which showed no yeast growth.     Today she continues to have vulvovaginal burning.  Reports it has not changed since December, despite these treatments (also has not worsened).  Did change her laundry detergent several months ago.  Last week took 2 baths with baking soda without any relief.      Denies change in vaginal discharge, vaginal itching, vaginal dryness, pelvic or abdominal pain, or fever.  Denies pain with intercourse or abnormal vaginal bleeding.  Denies urinary symptoms today; Vianca had a urine culture done on 2017 which showed no bacterial growth.     In a monogamous relationship; Denies history of known exposure to STD. Gonorrhea & chlamydia testing were negative on 2017.       Contraception: Ortho-tricyclen; Vianca has been on this pill since   Last pap smear: 2017 NIL, HPV negative    Vianca is a  Student.    Patient's last menstrual period was 2017.    OBJECTIVE:   /82 (BP Location: Left arm, Patient Position: Chair, Cuff Size: Adult Regular)  Pulse 71  Ht 1.6 m (5' 2.99\")  Wt 76.2 kg (168 lb 1.6 oz)  LMP 2017  BMI 29.79 kg/m2    She appears well, " afebrile.  Abdomen: benign, soft, nontender, no masses.  Pelvic Exam: normal vagina and vulva; non-tender to the touch; able to localize burning sensation primarily to the labia minora at 4 o'clock and 8 o'clock in relation to the clitoris    Vaginal discharge described as minimal amount of white, creamy, odorless discharge; normal cervix without lesions or tenderness, uterus normal size, non-tender, adenxa normal in size without tenderness.     Wet prep exam: pH=4.5; negative for clue cells, trichomonas, hyphae or budding  Urinalysis: not done     ASSESSMENT:      Vulvar dermatitis  Vaginal burning    PLAN:   Treatment: Apply clobetasol propionate sparingly to the affected area at night for 30 days; may stop this medication if the burning resolves.  Encouraged patient to switch her laundry detergent back to one she has used in the past, prior to the onset of the burning pain. If no relief of burning sensation with in 1 week, may also try Replens intravaginally.  Offered to switch patient's COCs to one with lower estrogen given her recurrent vaginal infections in the past, although no infection is present today.  Patient declines changing this today.   Return to clinic in 1 month for follow-up or sooner as needed.    25 minutes was spent in direct contact with the patient and > 50% of the time in patient education and coordination of care.  Lorena Steele, CLARA, APRN, WHNP

## 2017-05-30 NOTE — LETTER
2017       RE: Vianca Dumas  [address]  [place] [address]     Dear Colleague,    Thank you for referring your patient, Vianca Dumas, to the [facility] at [facility]. Please see a copy of my visit note below.    SUBJECTIVE:   32 year old  Female, , who complains of vaginal burning, onset in 2016.      Vianca was treated for a yeast infection with Diflucan in 2016 and 2017, with little relief of symptoms.  In March she was seen in clinic for this same symptom (vaginal burning) and had a negative wet prep.  She was encouraged to use Replens and start a daily probiotic.  Vianca did start the probiotic, but now is concerned that she has an overgrowth of lactobaccili; she stopped taking it 1 week ago.  She only used Replens for 1 day.  In April Vianca presented to clinic, again, with the same symptoms and was diagnosed with bacterial vaginosis.  She was treated with 5 nights of Metrogel, but states she never had any relief.  A yeast culture was done on 2017 which showed no yeast growth.     Today she continues to have vulvovaginal burning.  Reports it has not changed since December, despite these treatments (also has not worsened).  Did change her laundry detergent several months ago.  Last week took 2 baths with baking soda without any relief.      Denies change in vaginal discharge, vaginal itching, vaginal dryness, pelvic or abdominal pain, or fever.  Denies pain with intercourse or abnormal vaginal bleeding.  Denies urinary symptoms today; Vianca had a urine culture done on 2017 which showed no bacterial growth.     In a monogamous relationship; Denies history of known exposure to STD. Gonorrhea & chlamydia testing were negative on 2017.       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    Patient's last menstrual period was 05/18/2017.    OBJECTIVE:   /82 (BP Location: Left arm, Patient Position: Chair, Cuff Size: Adult Regular)  Pulse 71  Ht 1.6 m (5' 2.99\")  Wt 76.2 kg (168 lb 1.6 oz)  LMP 05/18/2017  BMI 29.79 kg/m2    She appears well, afebrile.  Abdomen: benign, soft, nontender, no masses.  Pelvic Exam: normal vagina and vulva; non-tender to the touch; able to localize burning sensation primarily to the labia minora at 4 o'clock and 8 o'clock in relation to the clitoris    Vaginal discharge described as minimal amount of white, creamy, odorless discharge; normal cervix without lesions or tenderness, uterus normal size, non-tender, adenxa normal in size without tenderness.     Wet prep exam: pH=4.5; negative for clue cells, trichomonas, hyphae or budding  Urinalysis: not done     ASSESSMENT:      Vulvar dermatitis  Vaginal burning    PLAN:   Treatment: Apply clobetasol propionate sparingly to the affected area at night for 30 days; may stop this medication if the burning resolves.  Encouraged patient to switch her laundry detergent back to one she has used in the past, prior to the onset of the burning pain. If no relief of burning sensation with in 1 week, may also try Replens intravaginally.  Offered to switch patient's COCs to one with lower estrogen given her recurrent vaginal infections in the past, although no infection is present today.  Patient declines changing this today.   Return to clinic in 1 month for follow-up or sooner as needed.    25 minutes was spent in direct contact with the patient and > 50% of the time in patient education and coordination of care.  Lorena Steele, CLARA, APRN, WHNP        "

## 2017-05-30 NOTE — MR AVS SNAPSHOT
"              After Visit Summary   5/30/2017    Vianca Dumas    MRN: [id number]           Patient Information     Date Of Birth          1985        Visit Information        Provider Department      5/30/2017 2:00 PM Lorena Steele APRN [facility]        Today's Diagnoses     Vulvar dermatitis    -  1    Vaginal burning           Follow-ups after your visit        Who to contact     Please call your clinic at [phone] to:    Ask questions about your health    Make or cancel appointments    Discuss your medicines    Learn about your test results    Speak to your doctor   If you have compliments or concerns about an experience at your clinic, or if you wish to file a complaint, please contact [facility] Patient Relations at [phone] or email us at [email or web address]         Additional Information About Your Visit        MyChart Information     [employer] gives you secure access to your electronic health record. If you see a primary care provider, you can also send messages to your care team and make appointments. If you have questions, please call your primary care clinic.  If you do not have a primary care provider, please call [phone] and they will assist you.      [employer] is an electronic gateway that provides easy, online access to your medical records. With [employer], you can request a clinic appointment, read your test results, renew a prescription or communicate with your care team.     To access your existing account, please contact your [facility] or call [phone] for assistance.        Care EveryWhere ID     This is your Care EveryWhere ID. This could be used by other organizations to access your [facility] medical records  [id number]        Your Vitals Were     Pulse Height Last Period BMI (Body Mass Index)          71 1.6 m (5' 2.99\") 05/18/2017 29.79 kg/m2         Blood Pressure " from Last 3 Encounters:   05/30/17 117/82   04/19/17 132/78   03/14/17 122/82    Weight from Last 3 Encounters:   05/30/17 76.2 kg (168 lb 1.6 oz)   04/19/17 78.6 kg (173 lb 3.2 oz)   03/14/17 75.4 kg (166 lb 4.8 oz)              We Performed the Following     Wet Prep POCT          Today's Medication Changes          These changes are accurate as of: 5/30/17 11:59 PM.  If you have any questions, ask your nurse or doctor.               Start taking these medicines.        Dose/Directions    clobetasol 0.05 % ointment   Commonly known as:  TEMOVATE   Used for:  Vulvar dermatitis   Started by:  Lorena Steele APRN CNP        Apply sparingly to affected area once daily as needed for up to 30 days.   Quantity:  45 g   Refills:  1            Where to get your medicines      These medications were sent to [facility] [address]  [address] [place] [address], [facility] [address]     Phone:  [phone]     clobetasol 0.05 % ointment                Primary Care Provider    None       No address on file        Thank you!     Thank you for choosing [facility]  for your care. Our goal is always to provide you with excellent care. Hearing back from our patients is one way we can continue to improve our services. Please take a few minutes to complete the written survey that you may receive in the mail after your visit with us. Thank you!             Your Updated Medication List - Protect others around you: Learn how to safely use, store and throw away your medicines at www.disposemymeds.org.          This list is accurate as of: 5/30/17 11:59 PM.  Always use your most recent med list.                   Brand Name Dispense Instructions for use    clobetasol 0.05 % ointment    TEMOVATE    45 g    Apply sparingly to affected area once daily as needed for up to 30 days.       HERBALS          loratadine 10 MG tablet    CLARITIN     Take 10 mg by mouth daily Reported on  4/19/2017       norgestimate-ethinyl estradiol 0.25-35 MG-MCG per tablet    ORTHO-CYCLEN, SPRINTEC    84 tablet    Take 1 tablet by mouth daily       SUDAFED PO      Reported on 4/19/2017

## 2017-05-31 LAB
CLUE CELLS: NORMAL
TRICHOMONAS (WET PREP): NORMAL
YEAST (WET PREP): NORMAL

## 2017-08-18 ENCOUNTER — OFFICE VISIT (OUTPATIENT)
Dept: OBGYN | Facility: CLINIC | Age: 32
End: 2017-08-18
Attending: NURSE PRACTITIONER
Payer: COMMERCIAL

## 2017-08-18 VITALS
SYSTOLIC BLOOD PRESSURE: 127 MMHG | WEIGHT: 165.6 LBS | BODY MASS INDEX: 29.34 KG/M2 | DIASTOLIC BLOOD PRESSURE: 82 MMHG | HEIGHT: 63 IN | HEART RATE: 101 BPM

## 2017-08-18 DIAGNOSIS — Z11.3 SCREEN FOR STD (SEXUALLY TRANSMITTED DISEASE): ICD-10-CM

## 2017-08-18 DIAGNOSIS — N89.8 VAGINAL ITCHING: Primary | ICD-10-CM

## 2017-08-18 DIAGNOSIS — B37.31 RECURRENT CANDIDIASIS OF VAGINA: ICD-10-CM

## 2017-08-18 DIAGNOSIS — R30.0 DYSURIA: ICD-10-CM

## 2017-08-18 DIAGNOSIS — Z13.1 SCREENING FOR DIABETES MELLITUS: ICD-10-CM

## 2017-08-18 DIAGNOSIS — B37.31 VULVOVAGINAL CANDIDIASIS: ICD-10-CM

## 2017-08-18 DIAGNOSIS — R31.9 BLOOD IN URINE: ICD-10-CM

## 2017-08-18 LAB
APPEARANCE UR: CLEAR
BILIRUB UR QL: NORMAL
COLOR UR: YELLOW
GLUCOSE URINE: NORMAL MG/DL
HBA1C MFR BLD: 5.5 % (ref 4.3–6)
HGB UR QL: NORMAL
KETONES UR QL: NORMAL MG/DL
LEUKOCYTE ESTERASE URINE: NORMAL
NITRITE UR QL STRIP: NORMAL
PH UR STRIP: 6 PH (ref 5–7)
PROTEIN ALBUMIN URINE: NORMAL MG/DL
SOURCE: NORMAL
SP GR UR STRIP: 1.03 (ref 1–1.03)
UROBILINOGEN UR QL STRIP: NORMAL EU/DL (ref 0.2–1)

## 2017-08-18 PROCEDURE — 81003 URINALYSIS AUTO W/O SCOPE: CPT | Mod: ZF | Performed by: NURSE PRACTITIONER

## 2017-08-18 PROCEDURE — 87389 HIV-1 AG W/HIV-1&-2 AB AG IA: CPT | Performed by: NURSE PRACTITIONER

## 2017-08-18 PROCEDURE — 83036 HEMOGLOBIN GLYCOSYLATED A1C: CPT | Performed by: NURSE PRACTITIONER

## 2017-08-18 PROCEDURE — 99212 OFFICE O/P EST SF 10 MIN: CPT | Mod: ZF

## 2017-08-18 PROCEDURE — 87086 URINE CULTURE/COLONY COUNT: CPT | Performed by: NURSE PRACTITIONER

## 2017-08-18 PROCEDURE — 36415 COLL VENOUS BLD VENIPUNCTURE: CPT | Performed by: NURSE PRACTITIONER

## 2017-08-18 RX ORDER — CLOTRIMAZOLE 1 %
CREAM (GRAM) TOPICAL 2 TIMES DAILY
Qty: 14 G | Refills: 0 | Status: SHIPPED | OUTPATIENT
Start: 2017-08-18 | End: 2017-08-25

## 2017-08-18 RX ORDER — FLUCONAZOLE 150 MG/1
150 TABLET ORAL ONCE
Qty: 1 TABLET | Refills: 1 | Status: SHIPPED | OUTPATIENT
Start: 2017-08-18 | End: 2017-08-18

## 2017-08-18 ASSESSMENT — PAIN SCALES - GENERAL: PAINLEVEL: NO PAIN (0)

## 2017-08-18 NOTE — LETTER
"2017       RE: Vianca Dumas  [address]  [place] [address]     Dear Colleague,    Thank you for referring your patient, Vianca Dumas, to the [facility] at [facility]. Please see a copy of my visit note below.    Vianca is a 32 yr old female, , who presents to clinic with recurrent vulvovaginal itching & burning. This episode started several days ago.     Vianca has had recurrent vaginal infections since 2016 after taking an antibiotic.  Prior to that infections only occurred once per year.  Vianca was most recently treated, however, for vulvar dermatitis when she presented for symptoms of vaginal burning on 2017 and recommended to use clobestasol for 30 nights as well as Replens and to start a probiotic.  States this provided short term relief.     Noted increase in vaginal discharge today, which is white and chunky.  Does report slight burning with urination.  Denies pelvic pain, abnormal bleeding, fever, urinary frequency, urgency, or incontinence.  Denies dyspareunia; uses Replens prior to intercourse due to vaginal dryness.     Has tried baking soda baths and application of natural oils on vulvovaginal tissue without great relief.  Also has noticed itching on both feet, primarily in the arches.  Intermittently improved vs worse.     LMP: 8/10/2017  Contraception: COCs   Declines STD testing today    OBJECTIVE:  /82 (BP Location: Right arm, Patient Position: Chair, Cuff Size: Adult Regular)  Pulse 101  Ht 1.6 m (5' 2.99\")  Wt 75.1 kg (165 lb 9.6 oz)  LMP 08/10/2017  BMI 29.34 kg/m2  General: pleasant female in no acute distress  Abdomen: soft, non-tender  Pelvic exam: normal vagina and vulva, vaginal discharge described as white, thick, small amount of clumping, normal cervix without lesions or tenderness, uterus normal size anteverted, adenxa normal in size without tenderness    Wet prep: " pH=4.5; yeast present; negative for clue cells and trichomonas  UA: Ketones trace, blood moderate, protein trace; negative for nitrites and leukocytes    ASSESSMENT:  Vaginal itching  Dysuria  Blood in urine  Vulvovaginal candidiasis  Recurrent candidiasis of vagina  Screen for STD  Screening for diabetes    PLAN:  Treat vulovaginal candiasis with Diflucan.  Patient was given a refill to take 72 hrs after the first dose if still having any vaginal symptoms.  May also apply clotrimazole cream externally (on vulva) as needed up to twice per day for 7 days.   Tests for HIV and diabetes ordered today given recurrence of yeast infections.   Will send urine down for a culture to confirm there is no presence of a UTI. It was noted that Vianca has had microscopic hematuria in the last 2 urine samples that were provided (when timing is not consistent with menses); if no infection is present today, the cause should be further assessed.    Recommended patient see a podiatrist or dermatologist for itching on feet.     15 minutes was spent in direct contact with the patient and > 50% of the time in patient education and coordination of care.    Lorena Steele, DNP, APRN, WHNP

## 2017-08-18 NOTE — PROGRESS NOTES
"Vianca is a 32 yr old female, , who presents to clinic with recurrent vulvovaginal itching & burning. This episode started several days ago.     Vianca has had recurrent vaginal infections since 2016 after taking an antibiotic.  Prior to that infections only occurred once per year.  Vianca was most recently treated, however, for vulvar dermatitis when she presented for symptoms of vaginal burning on 2017 and recommended to use clobestasol for 30 nights as well as Replens and to start a probiotic.  States this provided short term relief.     Noted increase in vaginal discharge today, which is white and chunky.  Does report slight burning with urination.  Denies pelvic pain, abnormal bleeding, fever, urinary frequency, urgency, or incontinence.  Denies dyspareunia; uses Replens prior to intercourse due to vaginal dryness.     Has tried baking soda baths and application of natural oils on vulvovaginal tissue without great relief.  Also has noticed itching on both feet, primarily in the arches.  Intermittently improved vs worse.     LMP: 8/10/2017  Contraception: COCs   Declines STD testing today    OBJECTIVE:  /82 (BP Location: Right arm, Patient Position: Chair, Cuff Size: Adult Regular)  Pulse 101  Ht 1.6 m (5' 2.99\")  Wt 75.1 kg (165 lb 9.6 oz)  LMP 08/10/2017  BMI 29.34 kg/m2  General: pleasant female in no acute distress  Abdomen: soft, non-tender  Pelvic exam: normal vagina and vulva, vaginal discharge described as white, thick, small amount of clumping, normal cervix without lesions or tenderness, uterus normal size anteverted, adenxa normal in size without tenderness    Wet prep: pH=4.5; yeast present; negative for clue cells and trichomonas  UA: Ketones trace, blood moderate, protein trace; negative for nitrites and leukocytes    ASSESSMENT:  Vaginal itching  Dysuria  Blood in urine  Vulvovaginal candidiasis  Recurrent candidiasis of vagina  Screen for STD  Screening for " diabetes    PLAN:  Treat vulovaginal candiasis with Diflucan.  Patient was given a refill to take 72 hrs after the first dose if still having any vaginal symptoms.  May also apply clotrimazole cream externally (on vulva) as needed up to twice per day for 7 days.   Tests for HIV and diabetes ordered today given recurrence of yeast infections.   Will send urine down for a culture to confirm there is no presence of a UTI. It was noted that Vianca has had microscopic hematuria in the last 2 urine samples that were provided (when timing is not consistent with menses); if no infection is present today, the cause should be further assessed.    Recommended patient see a podiatrist or dermatologist for itching on feet.     15 minutes was spent in direct contact with the patient and > 50% of the time in patient education and coordination of care.    Lorena Steele, DNP, APRN, WHNP

## 2017-08-18 NOTE — MR AVS SNAPSHOT
After Visit Summary   8/18/2017    Vianca Dumas    MRN: [id number]           Patient Information     Date Of Birth          1985        Visit Information        Provider Department      8/18/2017 2:30 PM Lorena Steele APRN CNP [facility]        Today's Diagnoses     Vaginal itching    -  1    Dysuria        Blood in urine          Care Instructions    Take first dose of Diflucan today.  Take a 2nd dose (refill) on Monday (after 72 days) if you still have any vaginal symptoms (burning, itching, irritation, thicker discharge).  May also apply clotrimazole cream externally (on vulva) as needed up to twice per day for 7 days.     Go to the lab today to have your HIV and HgbA1c (diabetes screen) drawn. You will be notified of these results by [employer].    A urine culture will be sent to the lab to make sure you do not have a urinary tract infection.  You will be notified of these results by [employer] and/or phone within 2-5 days.     Recommend seeing a podiatrist or dermatologist for itching on your feet.            Follow-ups after your visit        Who to contact     Please call your clinic at [phone] to:    Ask questions about your health    Make or cancel appointments    Discuss your medicines    Learn about your test results    Speak to your doctor   If you have compliments or concerns about an experience at your clinic, or if you wish to file a complaint, please contact [facility] Patient Relations at [phone] or email us at [email or web address]         Additional Information About Your Visit        [employer] Information     [employer] gives you secure access to your electronic health record. If you see a primary care provider, you can also send messages to your care team and make appointments. If you have questions, please call your primary care clinic.  If you do not have a primary care provider, please call  "[phone] and they will assist you.      [employer] is an electronic gateway that provides easy, online access to your medical records. With [employer], you can request a clinic appointment, read your test results, renew a prescription or communicate with your care team.     To access your existing account, please contact your [facility] or call [phone] for assistance.        Care EveryWhere ID     This is your Care EveryWhere ID. This could be used by other organizations to access your [facility] medical records  [id number]        Your Vitals Were     Pulse Height Last Period BMI (Body Mass Index)          101 1.6 m (5' 2.99\") 08/10/2017 29.34 kg/m2         Blood Pressure from Last 3 Encounters:   08/18/17 127/82   05/30/17 117/82   04/19/17 132/78    Weight from Last 3 Encounters:   08/18/17 75.1 kg (165 lb 9.6 oz)   05/30/17 76.2 kg (168 lb 1.6 oz)   04/19/17 78.6 kg (173 lb 3.2 oz)              We Performed the Following     Urinalysis chemical screen POCT     Urine Culture Aerobic Bacterial     Wet Prep POCT        Primary Care Provider    None       No address on file        Equal Access to Services     SHELLEY SAVAGE AH: Hadii filomena Saleh, waaxda lujudy, qaybta kaalmada adedmitriyda, zainab billy. So [facility] [phone].    ATENCIÓN: Si habla español, tiene a car disposición servicios gratuitos de asistencia lingüística. Llame al [phone].    We comply with applicable federal civil rights laws and [place] laws. We do not discriminate on the basis of race, color, national origin, age, disability sex, sexual orientation or gender identity.            Thank you!     Thank you for choosing [facility]  for your care. Our goal is always to provide you with excellent care. Hearing back from our patients is one way we can continue to improve our services. Please take a few minutes to complete the written survey that you may " receive in the mail after your visit with us. Thank you!             Your Updated Medication List - Protect others around you: Learn how to safely use, store and throw away your medicines at www.disposemymeds.org.          This list is accurate as of: 8/18/17  3:19 PM.  Always use your most recent med list.                   Brand Name Dispense Instructions for use Diagnosis    HERBALS           loratadine 10 MG tablet    CLARITIN     Take 10 mg by mouth daily Reported on 4/19/2017        norgestimate-ethinyl estradiol 0.25-35 MG-MCG per tablet    ORTHO-CYCLEN, SPRINTEC    84 tablet    Take 1 tablet by mouth daily    Routine general medical examination at a health care facility       [facility]      Reported on 4/19/2017

## 2017-08-18 NOTE — PATIENT INSTRUCTIONS
Take first dose of Diflucan today.  Take a 2nd dose (refill) on Monday (after 72 days) if you still have any vaginal symptoms (burning, itching, irritation, thicker discharge).  May also apply clotrimazole cream externally (on vulva) as needed up to twice per day for 7 days.     Go to the lab today to have your HIV and HgbA1c (diabetes screen) drawn. You will be notified of these results by [employer].    A urine culture will be sent to the lab to make sure you do not have a urinary tract infection.  You will be notified of these results by MyChart and/or phone within 2-5 days.     Recommend seeing a podiatrist or dermatologist for itching on your feet.

## 2017-08-19 LAB
BACTERIA SPEC CULT: NO GROWTH
SPECIMEN SOURCE: NORMAL

## 2017-08-21 LAB — HIV 1+2 AB+HIV1 P24 AG SERPL QL IA: NONREACTIVE

## 2017-08-25 ENCOUNTER — TELEPHONE (OUTPATIENT)
Dept: OBGYN | Facility: CLINIC | Age: 32
End: 2017-08-25

## 2017-08-25 DIAGNOSIS — R31.9 BLOOD IN URINE: Primary | ICD-10-CM

## 2017-08-25 DIAGNOSIS — N89.8 VAGINAL DISCHARGE: ICD-10-CM

## 2017-08-25 DIAGNOSIS — L29.2 VULVOVAGINAL ITCHING: ICD-10-CM

## 2017-08-25 DIAGNOSIS — B37.31 VULVOVAGINAL CANDIDIASIS: ICD-10-CM

## 2017-08-25 DIAGNOSIS — R31.9 BLOOD IN URINE: ICD-10-CM

## 2017-08-25 LAB
ALBUMIN UR-MCNC: NEGATIVE MG/DL
APPEARANCE UR: CLEAR
BACTERIA #/AREA URNS HPF: ABNORMAL /HPF
BILIRUB UR QL STRIP: NEGATIVE
COLOR UR AUTO: ABNORMAL
CREAT UR-MCNC: 45 MG/DL
GLUCOSE UR STRIP-MCNC: NEGATIVE MG/DL
HGB UR QL STRIP: ABNORMAL
KETONES UR STRIP-MCNC: NEGATIVE MG/DL
LEUKOCYTE ESTERASE UR QL STRIP: NEGATIVE
NITRATE UR QL: NEGATIVE
PH UR STRIP: 6.5 PH (ref 5–7)
PROT UR-MCNC: 0.06 G/L
PROT/CREAT 24H UR: 0.13 G/G CR (ref 0–0.2)
RBC #/AREA URNS AUTO: 3 /HPF (ref 0–2)
SOURCE: ABNORMAL
SP GR UR STRIP: 1.01 (ref 1–1.03)
UROBILINOGEN UR STRIP-MCNC: NORMAL MG/DL (ref 0–2)
WBC #/AREA URNS AUTO: <1 /HPF (ref 0–2)

## 2017-08-25 PROCEDURE — 81001 URINALYSIS AUTO W/SCOPE: CPT | Performed by: NURSE PRACTITIONER

## 2017-08-25 PROCEDURE — 84156 ASSAY OF PROTEIN URINE: CPT | Performed by: NURSE PRACTITIONER

## 2017-08-25 RX ORDER — FLUCONAZOLE 150 MG/1
150 TABLET ORAL ONCE
Qty: 1 TABLET | Refills: 0 | Status: SHIPPED | OUTPATIENT
Start: 2017-08-25 | End: 2017-08-25

## 2017-08-25 NOTE — TELEPHONE ENCOUNTER
Called Vianca to follow-up on her lab results.    Vianca's HgbA1c (screening for diabetes) was normal.  HIV was negative. Her urine culture was negative (no evidence of Urinary Tract Infection).  Vianca's last two urinalysis chemical screen POCT indicted blood present in her urine.  It is recommended that Vianca return to any [facility] lab to complete a urinalysis; she agreed with this plan.    Vianca states that she took the two Diflucan doses, 72 hours apart, and symptoms seemed to be slightly better, but now she is noticing a significant increase in chunky white discharge and vaginal itching, again.  Tried the clotrimazole cream, which seemed to make her itching worse.  Requesting another dose of Diflucan.  Provider instructed patient that she would put an order in for 1 more dose; if symptoms do not completely resolve, she should be seen in clinic. Vianca expressed understanding.     Lorena Steele, CLARA, APRN, WHNP

## 2017-09-01 ENCOUNTER — TELEPHONE (OUTPATIENT)
Dept: OBGYN | Facility: CLINIC | Age: 32
End: 2017-09-01

## 2017-09-01 DIAGNOSIS — R31.29 MICROSCOPIC HEMATURIA: Primary | ICD-10-CM

## 2017-09-01 NOTE — TELEPHONE ENCOUNTER
Left voicemail requesting Vianca call [facility] back when she receives this message.  Attempted to reach Vianca to notify her of her urinalysis results.  Vianca's urinalysis indicated blood and RBCs present in her urine.  This has been consistent for the last >5 months; UAs have been done when patient did not have a period.  She had a negative urine culture on 8/18/2017.  Recommending Vianca follow-up with Urology.  A referral order has been placed. She should be instructed to call [phone] to schedule an appointment.     Lorena Steele, CLARA, APRN, WHNP

## 2017-09-12 ENCOUNTER — TELEPHONE (OUTPATIENT)
Dept: ORTHOPEDICS | Facility: CLINIC | Age: 32
End: 2017-09-12

## 2017-09-12 NOTE — TELEPHONE ENCOUNTER
Message left for pt to return call to clinic to schedule an appointment for spine. Referral from [facility]. Pt will need to have records sent over before appointment

## 2017-09-13 ENCOUNTER — TELEPHONE (OUTPATIENT)
Dept: OBGYN | Facility: CLINIC | Age: 32
End: 2017-09-13

## 2017-09-13 NOTE — TELEPHONE ENCOUNTER
Left voicemail requesting Vianca call [facility] back when she receives this message.  Attempted to reach Vianca to notify her of her urinalysis results and recommendations for a consult with urology (see previous telephone note).  A referral order has been placed. She should be instructed to call [phone] to schedule an appointment.     Lorena Steele, DNP, APRN, WHNP

## 2017-09-18 ENCOUNTER — PRE VISIT (OUTPATIENT)
Dept: UROLOGY | Facility: CLINIC | Age: 32
End: 2017-09-18

## 2017-10-02 ENCOUNTER — PRE VISIT (OUTPATIENT)
Dept: UROLOGY | Facility: CLINIC | Age: 32
End: 2017-10-02

## 2017-10-15 ASSESSMENT — ENCOUNTER SYMPTOMS
SWOLLEN GLANDS: 0
DECREASED LIBIDO: 0
SINUS CONGESTION: 1
FLANK PAIN: 0
SKIN CHANGES: 0
NAIL CHANGES: 1
DIFFICULTY URINATING: 0
SORE THROAT: 1
TROUBLE SWALLOWING: 0
SMELL DISTURBANCE: 0
DYSURIA: 1
SINUS PAIN: 1
HEMATURIA: 0
NECK MASS: 0
TASTE DISTURBANCE: 0
HOARSE VOICE: 0
POOR WOUND HEALING: 0
HOT FLASHES: 0
BRUISES/BLEEDS EASILY: 1

## 2017-10-16 ENCOUNTER — OFFICE VISIT (OUTPATIENT)
Dept: UROLOGY | Facility: CLINIC | Age: 32
End: 2017-10-16

## 2017-10-16 VITALS
HEART RATE: 89 BPM | WEIGHT: 167 LBS | BODY MASS INDEX: 29.59 KG/M2 | SYSTOLIC BLOOD PRESSURE: 119 MMHG | HEIGHT: 63 IN | DIASTOLIC BLOOD PRESSURE: 81 MMHG

## 2017-10-16 DIAGNOSIS — R31.29 MICROSCOPIC HEMATURIA: Primary | ICD-10-CM

## 2017-10-16 LAB
ALBUMIN UR-MCNC: NEGATIVE MG/DL
APPEARANCE UR: CLEAR
BILIRUB UR QL STRIP: NEGATIVE
COLOR UR AUTO: YELLOW
GLUCOSE UR STRIP-MCNC: NEGATIVE MG/DL
HGB UR QL STRIP: ABNORMAL
KETONES UR STRIP-MCNC: NEGATIVE MG/DL
LEUKOCYTE ESTERASE UR QL STRIP: NEGATIVE
MUCOUS THREADS #/AREA URNS LPF: PRESENT /LPF
NITRATE UR QL: NEGATIVE
PH UR STRIP: 6 PH (ref 5–7)
RBC #/AREA URNS AUTO: 13 /HPF (ref 0–2)
SOURCE: ABNORMAL
SP GR UR STRIP: 1.01 (ref 1–1.03)
SQUAMOUS #/AREA URNS AUTO: <1 /HPF (ref 0–1)
UROBILINOGEN UR STRIP-MCNC: 0 MG/DL (ref 0–2)
WBC #/AREA URNS AUTO: 1 /HPF (ref 0–2)

## 2017-10-16 ASSESSMENT — PAIN SCALES - GENERAL: PAINLEVEL: NO PAIN (0)

## 2017-10-16 NOTE — PATIENT INSTRUCTIONS
UROLOGY CLINIC VISIT PATIENT INSTRUCTIONS    1) Schedule CT urogram (today if possible).    2) Follow up for a cystoscopy with the next available female urologist.     I will send you results via [employer].     CYSTOSCOPY    What is a Cystoscopy?  This is a procedure done to check for problems inside the bladder.  Problems may include polyps (growths), tumors, inflammation (swelling and redness) and other concerns.    The doctor inserts a thin tube (called a cystoscope) into the bladder.  The tube is about the size of a pencil.  We will give you numbing medicine to reduce the pain or discomfort you may feel.    The tube allows the doctor to:  The doctor will be able to see inside the bladder by filling the bladder with water.  The water makes it easier to see any problems that may be present.    If needed, the doctor may use the tube to:  The doctor is able to take tissue samples (biopsies).  Samples are sent to the lab for testing.  The doctor can also burn off any small growths or tumors that are found.  This is call fulguration.    How should I get ready for the exam?  To prepare, stop taking any medications as instructed. Ask whether you should avoid eating or drinking anything after midnight before the procedure. Follow any other instructions your doctor gives you.    If you are having this procedure done at the clinic, you will be there for up to an hour.  You will receive care before and after the procedure.    Please tell your doctor if:  - You have a history of urinary tract infections.  - You know that you have a tumor in your bladder.  - You have bleeding problems.  - You have any allergies.  - You are or may be pregnant.      What happens after the exam?  You may go back to your normal diet and activity as you feel ready, unless your doctor tells you not to.    For the next two days, you may notice:  - Some blood in your urine.  - Some burning when you urinate (use the toilet).  - An urge to urinate more  often.  - Bladder spasms.    These are normal after the procedure. They should go away on their own after a day or two.      - You can help to relieve the above listed symptoms by:  - Drinking 6 to 8 large glasses of water each day (includes drinks at meals).  This will help clear the urine.  - Take warm baths to relieve pain and bladder spasms.  Do not add anything to the bath water.  - Your doctor may prescribe pain medicine.  You may also take Tylenol (acetaminophen) for pain.    When should I call my doctor?  - A fever over 100.0 F (38 C) for more than a day.  (Before you call the doctor, check your temperature under your tongue.)  - Chills.  - Failure to urinate: No urine comes out when you try to use the toilet.  (Try soaking in a bathtub full of warm water.  If still no urine, call your doctor.)  - A lot of blood in the urine or blood clots larger than a nickel.  - Pain in the back or abdomen (belly / stomach area).  - Pain or spasms that are not relieved by warm tub baths and pain medicine.  - Severe pain, burning or other problems while passing urine.  - Pain that gets worse after two days.    If you have any issues, questions or concerns in the meantime, do not hesitate to contact us at [phone] or via [employer].     It was a pleasure meeting with you today.  Thank you for allowing me and my team the privilege of caring for you today.  YOU are the reason we are here, and I truly hope we provided you with the excellent service you deserve.  Please let us know if there is anything else we can do for you so that we can be sure you are leaving completely satisfied with your care experience.

## 2017-10-16 NOTE — PROGRESS NOTES
CC: microscopic hematuria.    HPI: It is a pleasure to see Ms. Vianca Dumas, a very pleasant 32 year old female, asked to be seen in consultation by AMANDA Powell CNP for evaluation of microscopic hematuria. This has been detected on several urinalyses dating back over the previous year (see below).    The patient denies any gross hematuria.  She also denies any dysuria, frequency, urgency, pyuria, hesitancy, intermittency, feelings of incomplete emptying, or any recent history of urinary tract infections or stones. Periods are regular. She is not currently on her menstrual cycle.    Review of Diagnostics:  8/25/17 - UA: small blood, 3 RBC, <1 WBC, few bacteria, o/w wnl  8/18/17 - UA: moderate blood (no micro) --> UC: No growth  3/14/17 - UA: small blood (no micro) --> UC: No growth    Hematuria Risk Factors:  Age >40: no  Smoking history: yes - current everyday smoker  Occupational exposure to chemicals or dyes (ie, benzenes, aromatic amines): no  History of urologic disorder or disease: no  History of irritative voiding symptoms: no  History of urinary tract infection: no  Analgesic abuse: no  History of pelvic irradiation: no    Past Medical History:   Diagnosis Date     Allergic rhinitis      Ruptured ear drum, right 2016     Past Surgical History:   Procedure Laterality Date     TONSILLECTOMY      6/2002     Current Outpatient Prescriptions   Medication Sig Dispense Refill     loratadine (CLARITIN) 10 MG tablet Take 10 mg by mouth daily Reported on 4/19/2017       Pseudoephedrine HCl (SUDAFED PO) Reported on 4/19/2017       norgestimate-ethinyl estradiol (ORTHO-CYCLEN, SPRINTEC) 0.25-35 MG-MCG per tablet Take 1 tablet by mouth daily 84 tablet 3     HERBALS        No Known Allergies  FAMILY HISTORY: There is no reported history of genitourinary carcinoma.  There is no history of urolithiasis.      SOCIAL HISTORY: The patient does smoke cigarettes, minimal EtOH and no illicit drug use.    ROS:  "  Answers for HPI/ROS submitted by the patient on 10/15/2017   General Symptoms: No  Skin Symptoms: Yes  HENT Symptoms: Yes  EYE SYMPTOMS: No  HEART SYMPTOMS: No  LUNG SYMPTOMS: No  INTESTINAL SYMPTOMS: No  URINARY SYMPTOMS: Yes  GYNECOLOGIC SYMPTOMS: Yes  BREAST SYMPTOMS: No  SKELETAL SYMPTOMS: No  BLOOD SYMPTOMS: Yes  NERVOUS SYSTEM SYMPTOMS: No  MENTAL HEALTH SYMPTOMS: No  Changes in hair: No  Changes in moles/birth marks: No  Itching: Yes  Rashes: Yes  Changes in nails: Yes  Acne: No  Hair in places you don't want it: No  Change in facial hair: No  Warts: No  Non-healing sores: No  Scarring: No  Flaking of skin: Yes  Color changes of hands/feet in cold : No  Sun sensitivity: No  Skin thickening: No  Ear pain: Yes  Ear discharge: No  Hearing loss: No  Tinnitus: No  Nosebleeds: No  Congestion: Yes  Sinus pain: Yes  Trouble swallowing: No   Voice hoarseness: No  Mouth sores: No  Sore throat: Yes  Tooth pain: No  Gum tenderness: No  Bleeding gums: No  Change in taste: No  Change in sense of smell: No  Dry mouth: No  Hearing aid used: No  Neck lump: No  Trouble holding urine or incontinence: No  Pain or burning: Yes  Trouble starting or stopping: No  Increased frequency of urination: No  Blood in urine: No  Decreased frequency of urination: No  Frequent nighttime urination: No  Flank pain: No  Difficulty emptying bladder: No  Anemia: No  Swollen glands: No  Easy bleeding or bruising: Yes  Edema or swelling: No  Bleeding or spotting between periods: No  Heavy or painful periods: No  Irregular periods: No  Vaginal discharge: Yes  Hot flashes: No  Vaginal dryness: No  Genital ulcers: No  Reduced libido: No  Painful intercourse: No  Difficulty with sexual arousal: No  Post-menopausal bleeding: No    PHYSICAL EXAM:   Vitals:    10/16/17 1520   BP: 119/81   Pulse: 89   Weight: 75.8 kg (167 lb)   Height: 1.6 m (5' 3\")     GENERAL: Well groomed, well developed, well nourished female in NAD.  HEENT: AT, NC, EOMI " bilaterally.  SKIN: Warm to touch, dry.  No visible rashes or lesions on examined areas.  RESP: No increased respiratory effort.  MS: Full ROM in extremities.  PELVIC: Deferred for now.   NEURO: Alert and oriented x 3.  PSYCH: Normal mood and affect, pleasant and agreeable during interview and exam.    LABS: UA today with large blood, 13 RBC, 1 WBC (patient denies being on her menstrual cycle).    IMAGING: none    ASSESSMENT and PLAN:    Ms. Vianca Dumas is a pleasant 32 year old female with microscopic hematuria confirmed on at least 2 urinalyses in the past 2 months, including today's (13 RBC). Not menstruating currently. She has had additional urine dipsticks with small-moderate blood as well. Cultures have been negative, making infection less likely. She is a current everyday smoker.  The differential diagnosis at this point includes stone disease, infection, vaginal contaminant, urothelial malignancy, renal disorder versus another yet unknown diagnosis.    At this time, given smoking status, recommend proceeding with comprehensive hematuria evaluation to include:  - Urinalysis and urine culture to rule out an acute urinary tract infection.   - CT urogram for upper tract imaging.  - Cystoscopy with the first available urologist to evaluate the interior of the bladder (patient prefers female).     Follow up for hematuria as recommended by urologist performing cystoscopic evaluation.    Thank you for allowing me to participate in Ms. Dumas's care. I will keep you updated of her progress, but please do not hesitate to contact me with any questions.    About 25 minutes were spent with the patient today, > 50% in counseling and coordination of care.    Estephanie Meek PA-C  Department of Urology

## 2017-10-16 NOTE — MR AVS SNAPSHOT
After Visit Summary   10/16/2017    Vianca Dumas    MRN: [id number]           Patient Information     Date Of Birth          1985        Visit Information        Provider Department      10/16/2017 3:30 PM Estephanie Meek PA-C [facility] Urology and [facility] for Prostate and Urologic Cancers        Today's Diagnoses     Microscopic hematuria    -  1      Care Instructions    UROLOGY CLINIC VISIT PATIENT INSTRUCTIONS    1) Schedule CT urogram (today if possible).    2) Follow up for a cystoscopy with the next available female urologist.     I will send you results via [address].     CYSTOSCOPY    What is a Cystoscopy?  This is a procedure done to check for problems inside the bladder.  Problems may include polyps (growths), tumors, inflammation (swelling and redness) and other concerns.    The doctor inserts a thin tube (called a cystoscope) into the bladder.  The tube is about the size of a pencil.  We will give you numbing medicine to reduce the pain or discomfort you may feel.    The tube allows the doctor to:  The doctor will be able to see inside the bladder by filling the bladder with water.  The water makes it easier to see any problems that may be present.    If needed, the doctor may use the tube to:  The doctor is able to take tissue samples (biopsies).  Samples are sent to the lab for testing.  The doctor can also burn off any small growths or tumors that are found.  This is call fulguration.    How should I get ready for the exam?  To prepare, stop taking any medications as instructed. Ask whether you should avoid eating or drinking anything after midnight before the procedure. Follow any other instructions your doctor gives you.    If you are having this procedure done at the clinic, you will be there for up to an hour.  You will receive care before and after the procedure.    Please tell your doctor if:  - You have a history of urinary tract infections.  - You know that you have a  tumor in your bladder.  - You have bleeding problems.  - You have any allergies.  - You are or may be pregnant.      What happens after the exam?  You may go back to your normal diet and activity as you feel ready, unless your doctor tells you not to.    For the next two days, you may notice:  - Some blood in your urine.  - Some burning when you urinate (use the toilet).  - An urge to urinate more often.  - Bladder spasms.    These are normal after the procedure. They should go away on their own after a day or two.      - You can help to relieve the above listed symptoms by:  - Drinking 6 to 8 large glasses of water each day (includes drinks at meals).  This will help clear the urine.  - Take warm baths to relieve pain and bladder spasms.  Do not add anything to the bath water.  - Your doctor may prescribe pain medicine.  You may also take Tylenol (acetaminophen) for pain.    When should I call my doctor?  - A fever over 100.0 F (38 C) for more than a day.  (Before you call the doctor, check your temperature under your tongue.)  - Chills.  - Failure to urinate: No urine comes out when you try to use the toilet.  (Try soaking in a bathtub full of warm water.  If still no urine, call your doctor.)  - A lot of blood in the urine or blood clots larger than a nickel.  - Pain in the back or abdomen (belly / stomach area).  - Pain or spasms that are not relieved by warm tub baths and pain medicine.  - Severe pain, burning or other problems while passing urine.  - Pain that gets worse after two days.    If you have any issues, questions or concerns in the meantime, do not hesitate to contact us at [phone] or via [employer].     It was a pleasure meeting with you today.  Thank you for allowing me and my team the privilege of caring for you today.  YOU are the reason we are here, and I truly hope we provided you with the excellent service you deserve.  Please let us know if there is anything else we can do for you so that  we can be sure you are leaving completely satisfied with your care experience.                Follow-ups after your visit        Your next 10 appointments already scheduled     Oct 16, 2017  5:20 PM CDT   (Arrive by 5:05 PM)   CT ABDOMEN PELVIS HEMATURIA W/O & W CONTRAST with UCCT1   [facility] CT ([facility])    [address]  [address] [place] [id number] [phone]           Please bring any scans or X-rays taken at other hospitals, if similar tests were done. Also bring a list of your medicines, including vitamins, minerals and over-the-counter drugs. It is safest to leave personal items at home.  Be sure to tell your doctor:   If you have any allergies.   If there s any chance you are pregnant.   If you are breastfeeding.   If you have any special needs.  You will have contrast for this exam. To prepare:   Do not eat or drink for 2 hours before your exam. If you need to take medicine, you may take it with small sips of water. (We may ask you to take liquid medicine as well.)   The day before your exam, drink extra fluids at least six 8-ounce glasses (unless your doctor tells you to restrict your fluids).  Patients over 70 or patients with diabetes or kidney problems:   If you haven t had a blood test (creatinine test) within the last 30 days, go to your clinic or Diagnostic Imaging Department for this test.  If you have diabetes:   If your kidney function is normal, continue taking your metformin (Avandamet, Glucophage, Glucovance, Metaglip) on the day of your exam.   If your kidney function is abnormal, wait 48 hours before restarting this medicine.  Please wear loose clothing, such as a sweat suit or jogging clothes. Avoid snaps, zippers and other metal. We may ask you to undress and put on a hospital gown.  If you have any questions, please call the Imaging Department where you will have your exam.            Nov 17, 2017  3:45 PM CST   (Arrive by 3:30  "PM)   Return Visit with Natacha Christine MD   [facility] Urology and [facility] for Prostate and Urologic Cancers ([facility])    [address]  [address] [facility] [address] [phone]              Future tests that were ordered for you today     Open Future Orders        Priority Expected Expires Ordered    CT Abdomen Pelvis Hematuria w/wo IV Contrast Routine  10/16/2018 10/16/2017            Who to contact     Please call your clinic at [phone] to:    Ask questions about your health    Make or cancel appointments    Discuss your medicines    Learn about your test results    Speak to your doctor   If you have compliments or concerns about an experience at your clinic, or if you wish to file a complaint, please contact [facility] Patient Relations at [phone] or email us at [email or web address]         Additional Information About Your Visit        [employer] Information     [employer] gives you secure access to your electronic health record. If you see a primary care provider, you can also send messages to your care team and make appointments. If you have questions, please call your primary care clinic.  If you do not have a primary care provider, please call [phone] and they will assist you.      [employer]ovue is an electronic gateway that provides easy, online access to your medical records. With [employer], you can request a clinic appointment, read your test results, renew a prescription or communicate with your care team.     To access your existing account, please contact your [facility] or call [phone] for assistance.        Care EveryWhere ID     This is your Care EveryWhere ID. This could be used by other organizations to access your [place] medical records  [id number]        Your Vitals Were     Pulse Height BMI (Body Mass Index)             89 1.6 m (5' 3\") 29.58 kg/m2          Blood " Pressure from Last 3 Encounters:   10/16/17 119/81   08/18/17 127/82   05/30/17 117/82    Weight from Last 3 Encounters:   10/16/17 75.8 kg (167 lb)   08/18/17 75.1 kg (165 lb 9.6 oz)   05/30/17 76.2 kg (168 lb 1.6 oz)              We Performed the Following     UA with Microscopic ([place] and [facility])     Urine Culture Aerobic Bacterial        Primary Care Provider    None Specified       No primary provider on file.        Equal Access to Services     SHELLEY SAVAGE : Hadii filomena ku hadasho Soomaali, waaxda luqadaha, qaybta kaalmada ademosesyafernanda, zainab bains . So [facility] [phone].    ATENCIÓN: Si habla español, tiene a car disposición servicios gratuitos de asistencia lingüística. Llame al [phone].    We comply with applicable federal civil rights laws and [place] laws. We do not discriminate on the basis of race, color, national origin, age, disability, sex, sexual orientation, or gender identity.            Thank you!     Thank you for choosing [facility] UROLOGY AND [facility] FOR PROSTATE AND UROLOGIC CANCERS  for your care. Our goal is always to provide you with excellent care. Hearing back from our patients is one way we can continue to improve our services. Please take a few minutes to complete the written survey that you may receive in the mail after your visit with us. Thank you!             Your Updated Medication List - Protect others around you: Learn how to safely use, store and throw away your medicines at www.disposemymeds.org.          This list is accurate as of: 10/16/17  3:53 PM.  Always use your most recent med list.                   Brand Name Dispense Instructions for use Diagnosis    HERBALS           loratadine 10 MG tablet    CLARITIN     Take 10 mg by mouth daily Reported on 4/19/2017        norgestimate-ethinyl estradiol 0.25-35 MG-MCG per tablet    ORTHO-CYCLEN, SPRINTEC    84 tablet    Take 1 tablet by mouth daily    Routine general medical examination at a  health care facility       SAKINA TOLBERT      Reported on 4/19/2017

## 2017-10-16 NOTE — LETTER
10/16/2017       RE: Vianca Dumas  [address]  [place] [address]     Dear Colleague,    Thank you for referring your patient, Vianca Dumas, to the [facility] UROLOGY AND INST FOR PROSTATE AND UROLOGIC CANCERS at [facility]. Please see a copy of my visit note below.    CC: microscopic hematuria.    HPI: It is a pleasure to see Ms. Vianca Dumas, a very pleasant 32 year old female, asked to be seen in consultation by AMANDA Powell, CNP for evaluation of microscopic hematuria. This has been detected on several urinalyses dating back over the previous year (see below).    The patient denies any gross hematuria.  She also denies any dysuria, frequency, urgency, pyuria, hesitancy, intermittency, feelings of incomplete emptying, or any recent history of urinary tract infections or stones. Periods are regular. She is not currently on her menstrual cycle.    Review of Diagnostics:  8/25/17 - UA: small blood, 3 RBC, <1 WBC, few bacteria, o/w wnl  8/18/17 - UA: moderate blood (no micro) --> UC: No growth  3/14/17 - UA: small blood (no micro) --> UC: No growth    Hematuria Risk Factors:  Age >40: no  Smoking history: yes - current everyday smoker  Occupational exposure to chemicals or dyes (ie, benzenes, aromatic amines): no  History of urologic disorder or disease: no  History of irritative voiding symptoms: no  History of urinary tract infection: no  Analgesic abuse: no  History of pelvic irradiation: no    Past Medical History:   Diagnosis Date     Allergic rhinitis      Ruptured ear drum, right 2016     Past Surgical History:   Procedure Laterality Date     TONSILLECTOMY      6/2002     Current Outpatient Prescriptions   Medication Sig Dispense Refill     loratadine (CLARITIN) 10 MG tablet Take 10 mg by mouth daily Reported on 4/19/2017       Pseudoephedrine HCl (SUDAFED PO) Reported on 4/19/2017       norgestimate-ethinyl estradiol (ORTHO-CYCLEN,  SPRINTEC) 0.25-35 MG-MCG per tablet Take 1 tablet by mouth daily 84 tablet 3     HERBALS        No Known Allergies  FAMILY HISTORY: There is no reported history of genitourinary carcinoma.  There is no history of urolithiasis.      SOCIAL HISTORY: The patient does smoke cigarettes, minimal EtOH and no illicit drug use.    ROS:   Answers for HPI/ROS submitted by the patient on 10/15/2017   General Symptoms: No  Skin Symptoms: Yes  HENT Symptoms: Yes  EYE SYMPTOMS: No  HEART SYMPTOMS: No  LUNG SYMPTOMS: No  INTESTINAL SYMPTOMS: No  URINARY SYMPTOMS: Yes  GYNECOLOGIC SYMPTOMS: Yes  BREAST SYMPTOMS: No  SKELETAL SYMPTOMS: No  BLOOD SYMPTOMS: Yes  NERVOUS SYSTEM SYMPTOMS: No  MENTAL HEALTH SYMPTOMS: No  Changes in hair: No  Changes in moles/birth marks: No  Itching: Yes  Rashes: Yes  Changes in nails: Yes  Acne: No  Hair in places you don't want it: No  Change in facial hair: No  Warts: No  Non-healing sores: No  Scarring: No  Flaking of skin: Yes  Color changes of hands/feet in cold : No  Sun sensitivity: No  Skin thickening: No  Ear pain: Yes  Ear discharge: No  Hearing loss: No  Tinnitus: No  Nosebleeds: No  Congestion: Yes  Sinus pain: Yes  Trouble swallowing: No   Voice hoarseness: No  Mouth sores: No  Sore throat: Yes  Tooth pain: No  Gum tenderness: No  Bleeding gums: No  Change in taste: No  Change in sense of smell: No  Dry mouth: No  Hearing aid used: No  Neck lump: No  Trouble holding urine or incontinence: No  Pain or burning: Yes  Trouble starting or stopping: No  Increased frequency of urination: No  Blood in urine: No  Decreased frequency of urination: No  Frequent nighttime urination: No  Flank pain: No  Difficulty emptying bladder: No  Anemia: No  Swollen glands: No  Easy bleeding or bruising: Yes  Edema or swelling: No  Bleeding or spotting between periods: No  Heavy or painful periods: No  Irregular periods: No  Vaginal discharge: Yes  Hot flashes: No  Vaginal dryness: No  Genital ulcers: No  Reduced  "libido: No  Painful intercourse: No  Difficulty with sexual arousal: No  Post-menopausal bleeding: No    PHYSICAL EXAM:   Vitals:    10/16/17 1520   BP: 119/81   Pulse: 89   Weight: 75.8 kg (167 lb)   Height: 1.6 m (5' 3\")     GENERAL: Well groomed, well developed, well nourished female in NAD.  HEENT: AT, NC, EOMI bilaterally.  SKIN: Warm to touch, dry.  No visible rashes or lesions on examined areas.  RESP: No increased respiratory effort.  MS: Full ROM in extremities.  PELVIC: Deferred for now.   NEURO: Alert and oriented x 3.  PSYCH: Normal mood and affect, pleasant and agreeable during interview and exam.    LABS: UA today with large blood, 13 RBC, 1 WBC (patient denies being on her menstrual cycle).    IMAGING: none    ASSESSMENT and PLAN:    Ms. Vianca Dumas is a pleasant 32 year old female with microscopic hematuria confirmed on at least 2 urinalyses in the past 2 months, including today's (13 RBC). Not menstruating currently. She has had additional urine dipsticks with small-moderate blood as well. Cultures have been negative, making infection less likely. She is a current everyday smoker.  The differential diagnosis at this point includes stone disease, infection, vaginal contaminant, urothelial malignancy, renal disorder versus another yet unknown diagnosis.    At this time, given smoking status, recommend proceeding with comprehensive hematuria evaluation to include:  - Urinalysis and urine culture to rule out an acute urinary tract infection.   - CT urogram for upper tract imaging.  - Cystoscopy with the first available urologist to evaluate the interior of the bladder (patient prefers female).     Follow up for hematuria as recommended by urologist performing cystoscopic evaluation.    Thank you for allowing me to participate in Ms. Dumas's care. I will keep you updated of her progress, but please do not hesitate to contact me with any questions.    About 25 minutes were spent with the " patient today, > 50% in counseling and coordination of care.    Estephanie Meek PA-C  Department of Urology

## 2017-10-17 LAB
BACTERIA SPEC CULT: NO GROWTH
Lab: NORMAL
SPECIMEN SOURCE: NORMAL

## 2017-10-23 ENCOUNTER — PRE VISIT (OUTPATIENT)
Dept: UROLOGY | Facility: CLINIC | Age: 32
End: 2017-10-23

## 2017-11-01 DIAGNOSIS — Z30.41 ENCOUNTER FOR SURVEILLANCE OF CONTRACEPTIVE PILLS: Primary | ICD-10-CM

## 2017-11-01 RX ORDER — LEVONORGESTREL/ETHIN.ESTRADIOL 0.1-0.02MG
1 TABLET ORAL DAILY
Qty: 84 TABLET | Refills: 3 | Status: SHIPPED | OUTPATIENT
Start: 2017-11-01 | End: 2018-02-02

## 2017-11-17 ENCOUNTER — OFFICE VISIT (OUTPATIENT)
Dept: UROLOGY | Facility: CLINIC | Age: 32
End: 2017-11-17

## 2017-11-17 VITALS
SYSTOLIC BLOOD PRESSURE: 122 MMHG | WEIGHT: 165.4 LBS | HEART RATE: 65 BPM | HEIGHT: 63 IN | DIASTOLIC BLOOD PRESSURE: 73 MMHG | BODY MASS INDEX: 29.3 KG/M2

## 2017-11-17 DIAGNOSIS — R31.29 OTHER MICROSCOPIC HEMATURIA: Primary | ICD-10-CM

## 2017-11-17 ASSESSMENT — PAIN SCALES - GENERAL: PAINLEVEL: MILD PAIN (2)

## 2017-11-17 NOTE — PATIENT INSTRUCTIONS
"Follow up in 3 months with Estephanie Meek PA-C    It was a pleasure meeting with you today.  Thank you for allowing me and my team the privilege of caring for you today.  YOU are the reason we are here, and I truly hope we provided you with the excellent service you deserve.  Please let us know if there is anything else we can do for you so that we can be sure you are leaving completely satisfied with your care experience.      AFTER YOUR CYSTOSCOPY        You have just completed a cystoscopy, or \"cysto\", which allowed your physician to learn more about your bladder (or to remove a stent placed after surgery). We suggest that you continue to avoid caffeine, fruit juice, and alcohol for the next 24 hours, however, you are encouraged to return to your normal activities.         A few things that are considered normal after your cystoscopy:     * Small amount of bleeding (or spotting) that clears within the next 24 hours     * Slight burning sensation with urination     * Sensation to of needing to avoid more frequently     * The feeling of \"air\" in your urine     * Mild discomfort that is relieved with Tylenol        Please contact our office promptly if you:     * Develop a fever above 101 degrees     * Are unable to urinate     * Develop bright red blood that does not stop     * Severe pain or swelling         Please contact our office with any concerns or questions @[email or web address].  "

## 2017-11-17 NOTE — NURSING NOTE
Chief Complaint   Patient presents with     Cystoscopy     Patient is coming in to see  for a cystoscopy for microscopic hematuria.     Amara Napoles

## 2017-11-17 NOTE — LETTER
11/17/2017       RE: Vianca Dumas  [id number]  [place] [address]     Dear Colleague,    Thank you for referring your patient, Vianca Dumas, to the [facility] UROLOGY AND INST FOR PROSTATE AND UROLOGIC CANCERS at [facility]. Please see a copy of my visit note below.      Indication: microhematuria     Negative CT scan     Discussed with patient the alternatives, risks, and procedure. Questions were answered. Informed consent was obtained for cystoscopy.    November 17, 2017 CYSTOSCOPY:  The patient was brought to the procedures room where she was placed in the supine position.  She was prepped and draped in a sterile fashion.  A #16-Persian flexible cystoscope was introduced into the urinary bladder.  The anterior urethra, membranous urethra, and bladder neck were negative.   Within the urinary bladder, there was no evidence of stones, tumors, or growths.  The ureteral orifices were well visualized bilaterally and found to have clear efflux of urine.   The patient had minimal trabeculation.  On retroflex view, no lesions were seen.    Repeat u/a, urine culture, urine cytology in ~ four months   Follow up with Estephanie Meek after above completed                   Again, thank you for allowing me to participate in the care of your patient.      Sincerely,    Natacha Christine MD

## 2017-11-17 NOTE — MR AVS SNAPSHOT
After Visit Summary   11/17/2017    Vianca Dumas    MRN: [id number]           Patient Information     Date Of Birth          1985        Visit Information        Provider Department      11/17/2017 3:45 PM Natacha Christine MD [facility] Urology and [facility] for Prostate and Urologic Cancers         Follow-ups after your visit        Your next 10 appointments already scheduled     Feb 19, 2018  1:00 PM CST   (Arrive by 12:45 PM)   Return Visit with Estephanie Meek PA-C   [facility] Urology and [facility] for Prostate and Urologic Cancers ([facility])    [address]  [address] [facility] [address] [phone]              Who to contact     Please call your clinic at [phone] to:    Ask questions about your health    Make or cancel appointments    Discuss your medicines    Learn about your test results    Speak to your doctor   If you have compliments or concerns about an experience at your clinic, or if you wish to file a complaint, please contact [facility] Patient Relations at [phone] or email us at [email or web address]         Additional Information About Your Visit        MyChart Information     [employer] gives you secure access to your electronic health record. If you see a primary care provider, you can also send messages to your care team and make appointments. If you have questions, please call your primary care clinic.  If you do not have a primary care provider, please call [phone] and they will assist you.      [employer] is an electronic gateway that provides easy, online access to your medical records. With [employer], you can request a clinic appointment, read your test results, renew a prescription or communicate with your care team.     To access your existing account, please contact your [facility] or call [phone] for assistance.        Care EveryWhere ID  "    This is your Care EveryWhere ID. This could be used by other organizations to access your [place] medical records  [id number]        Your Vitals Were     Pulse Height BMI (Body Mass Index)             65 1.6 m (5' 3\") 29.3 kg/m2          Blood Pressure from Last 3 Encounters:   11/17/17 122/73   10/16/17 119/81   08/18/17 127/82    Weight from Last 3 Encounters:   11/17/17 75 kg (165 lb 6.4 oz)   10/16/17 75.8 kg (167 lb)   08/18/17 75.1 kg (165 lb 9.6 oz)              Today, you had the following     No orders found for display         Today's Medication Changes          These changes are accurate as of: 11/17/17  4:36 PM.  If you have any questions, ask your nurse or doctor.               Stop taking these medicines if you haven't already. Please contact your care team if you have questions.     norgestimate-ethinyl estradiol 0.25-35 MG-MCG per tablet   Commonly known as:  ORTHO-CYCLEN, SPRINTEC   Stopped by:  Natacha Christine MD                    Primary Care Provider    None Specified       No primary provider on file.        Equal Access to Services     [facility]: Hadanthony Saleh, connie brar, isidro finney, zainab bains . So [facility] [phone].    ATENCIÓN: Si habla español, tiene a car disposición servicios gratuitos de asistencia lingüística. [facility] [phone].    We comply with applicable federal civil rights laws and [place] laws. We do not discriminate on the basis of race, color, national origin, age, disability, sex, sexual orientation, or gender identity.            Thank you!     Thank you for choosing [facility] UROLOGY AND [facility] FOR PROSTATE AND UROLOGIC CANCERS  for your care. Our goal is always to provide you with excellent care. Hearing back from our patients is one way we can continue to improve our services. Please take a few minutes to complete the written survey that you may receive in the mail after your visit " with us. Thank you!             Your Updated Medication List - Protect others around you: Learn how to safely use, store and throw away your medicines at www.disposemymeds.org.          This list is accurate as of: 11/17/17  4:36 PM.  Always use your most recent med list.                   Brand Name Dispense Instructions for use Diagnosis    HERBALS           levonorgestrel-ethinyl estradiol 0.1-20 MG-MCG per tablet    AVIANE,ALESSE,LESSINA    84 tablet    Take 1 tablet by mouth daily    Encounter for surveillance of contraceptive pills       loratadine 10 MG tablet    CLARITIN     Take 10 mg by mouth daily Reported on 4/19/2017        SUDAFED PO      Reported on 4/19/2017

## 2017-11-18 NOTE — PROGRESS NOTES
Indication: microhematuria     Negative CT scan     Discussed with patient the alternatives, risks, and procedure. Questions were answered. Informed consent was obtained for cystoscopy.    November 17, 2017 CYSTOSCOPY:  The patient was brought to the procedures room where she was placed in the supine position.  She was prepped and draped in a sterile fashion.  A #16-Slovak flexible cystoscope was introduced into the urinary bladder.  The anterior urethra, membranous urethra, and bladder neck were negative.   Within the urinary bladder, there was no evidence of stones, tumors, or growths.  The ureteral orifices were well visualized bilaterally and found to have clear efflux of urine.   The patient had minimal trabeculation.  On retroflex view, no lesions were seen.    Repeat u/a, urine culture, urine cytology in ~ four months   Follow up with Estephanie Meek after above completed

## 2018-02-01 ASSESSMENT — ANXIETY QUESTIONNAIRES
7. FEELING AFRAID AS IF SOMETHING AWFUL MIGHT HAPPEN: NOT AT ALL
GAD7 TOTAL SCORE: 1
2. NOT BEING ABLE TO STOP OR CONTROL WORRYING: NOT AT ALL
4. TROUBLE RELAXING: NOT AT ALL
1. FEELING NERVOUS, ANXIOUS, OR ON EDGE: NOT AT ALL
3. WORRYING TOO MUCH ABOUT DIFFERENT THINGS: NOT AT ALL
GAD7 TOTAL SCORE: 1
6. BECOMING EASILY ANNOYED OR IRRITABLE: SEVERAL DAYS
7. FEELING AFRAID AS IF SOMETHING AWFUL MIGHT HAPPEN: NOT AT ALL
5. BEING SO RESTLESS THAT IT IS HARD TO SIT STILL: NOT AT ALL

## 2018-02-01 ASSESSMENT — ENCOUNTER SYMPTOMS
HEMATURIA: 0
FLANK PAIN: 0
NAIL CHANGES: 1
POOR WOUND HEALING: 0
DYSURIA: 1
DIFFICULTY URINATING: 0
SKIN CHANGES: 0

## 2018-02-02 ENCOUNTER — OFFICE VISIT (OUTPATIENT)
Dept: OBGYN | Facility: CLINIC | Age: 33
End: 2018-02-02
Attending: NURSE PRACTITIONER
Payer: COMMERCIAL

## 2018-02-02 VITALS
WEIGHT: 169.8 LBS | DIASTOLIC BLOOD PRESSURE: 77 MMHG | HEART RATE: 76 BPM | BODY MASS INDEX: 30.09 KG/M2 | SYSTOLIC BLOOD PRESSURE: 117 MMHG | HEIGHT: 63 IN

## 2018-02-02 DIAGNOSIS — Z31.69 ENCOUNTER FOR PRECONCEPTION CONSULTATION: ICD-10-CM

## 2018-02-02 DIAGNOSIS — Z00.00 VISIT FOR PREVENTIVE HEALTH EXAMINATION: Primary | ICD-10-CM

## 2018-02-02 ASSESSMENT — ENCOUNTER SYMPTOMS
HALLUCINATIONS: 0
CHILLS: 0
TACHYCARDIA: 0
JOINT SWELLING: 0
DIFFICULTY URINATING: 0
COUGH: 0
DYSURIA: 1
POLYDIPSIA: 0
LEG PAIN: 0
EXTREMITY NUMBNESS: 0
WEIGHT LOSS: 0
TREMORS: 0
NERVOUS/ANXIOUS: 0
SMELL DISTURBANCE: 0
DECREASED CONCENTRATION: 0
DYSPNEA ON EXERTION: 0
EYE IRRITATION: 0
BREAST MASS: 0
SWOLLEN GLANDS: 0
HEMATURIA: 0
PANIC: 0
ARTHRALGIAS: 0
NECK PAIN: 0
SHORTNESS OF BREATH: 0
INSOMNIA: 0
POOR WOUND HEALING: 0
TASTE DISTURBANCE: 0
EXERCISE INTOLERANCE: 0
BOWEL INCONTINENCE: 0
JAUNDICE: 0
RESPIRATORY PAIN: 0
FATIGUE: 0
SPEECH CHANGE: 0
VOMITING: 0
DEPRESSION: 0
BLOATING: 0
ORTHOPNEA: 0
WEAKNESS: 0
LIGHT-HEADEDNESS: 0
SNORES LOUDLY: 0
BREAST PAIN: 0
SYNCOPE: 0
NAUSEA: 0
EYE PAIN: 0
EYE REDNESS: 0
DECREASED APPETITE: 0
NECK MASS: 0
BLOOD IN STOOL: 0
SINUS CONGESTION: 0
DOUBLE VISION: 0
TINGLING: 0
NIGHT SWEATS: 0
STIFFNESS: 0
SORE THROAT: 0
SEIZURES: 0
RECTAL BLEEDING: 0
INCREASED ENERGY: 0
MYALGIAS: 0
BRUISES/BLEEDS EASILY: 0
POSTURAL DYSPNEA: 0
HEADACHES: 0
CLAUDICATION: 0
TROUBLE SWALLOWING: 0
DISTURBANCES IN COORDINATION: 0
SINUS PAIN: 0
DIZZINESS: 0
HOARSE VOICE: 0
HYPERTENSION: 0
SPUTUM PRODUCTION: 0
PARALYSIS: 0
WHEEZING: 0
HEMOPTYSIS: 0
ALTERED TEMPERATURE REGULATION: 0
LEG SWELLING: 0
RECTAL PAIN: 0
CONSTIPATION: 0
SLEEP DISTURBANCES DUE TO BREATHING: 0
MEMORY LOSS: 0
LOSS OF CONSCIOUSNESS: 0
DECREASED LIBIDO: 0
FLANK PAIN: 0
BACK PAIN: 0
COUGH DISTURBING SLEEP: 0
SKIN CHANGES: 0
NUMBNESS: 0
DIARRHEA: 0
HOT FLASHES: 0
NAIL CHANGES: 1
WEIGHT GAIN: 0
FEVER: 0
EYE WATERING: 0
MUSCLE CRAMPS: 0
ABDOMINAL PAIN: 0
HYPOTENSION: 0
HEARTBURN: 0
POLYPHAGIA: 0
MUSCLE WEAKNESS: 0
PALPITATIONS: 0

## 2018-02-02 ASSESSMENT — ANXIETY QUESTIONNAIRES: GAD7 TOTAL SCORE: 1

## 2018-02-02 NOTE — PROGRESS NOTES
"  Progress Note    SUBJECTIVE:  Vianca Dumas is an 32 year old, , who requests an Annual Preventive Exam.     Concerns today include:  1) Trying to conceive: stopped taking birth control pills in December, she has had a period since stopping. Is using ovulation predictor kits and ovulated today, on Day 15 of her 25 day cycle. Is taking a prenatal vitamin daily.       Menstrual History:  Menstrual History 2017   LAST MENSTRUAL PERIOD 8/10/2017 2018 -   Menarche age - - 11   Period Cycle (Days) - - 25   Period Duration (Days) - - 3   Method of Contraception - - None   Period Pattern - - Regular   Menstrual Flow - - Moderate   Menstrual Control - - -   Dysmenorrhea - - Moderate   PMS Symptoms - - Cramping;Mood Changes   Reviewed Today - - Yes       Last    Lab Results   Component Value Date    PAP NIL 2017     Last   Lab Results   Component Value Date    HPV16 Negative 2017     Last   Lab Results   Component Value Date    HPV18 Negative 2017     Last   Lab Results   Component Value Date    HRHPV Negative 2017       Mammogram current: n/a  Last Mammogram: n/a    Last Colonoscopy: n/a  Lipids: \"done many years ago\"  HgbA1c: 5.5 (wnl) 2017  Vitamin D was low in ; has not been rechecked since then.     Social History: Works as a  in a lab. Drinks 2-3 alcohol drinks per week.     Exercise: None     Diet: eats a healthy diet, 3-4 servings of vegetables and fruit, 2 servings of calcium per day     Sexual Hx: Denies any sexual concerns; declines STD testing, in a monogamous relationship    Patient is currently following with urology for evaluation of hematuria and dysuria; no new change in her symptoms since seen by urology.  Due for follow-up around 2018.     HISTORY:    Current Outpatient Prescriptions on File Prior to Visit:  loratadine (CLARITIN) 10 MG tablet Take 10 mg by mouth daily Reported on 2017   Pseudoephedrine HCl " (SUDAFED PO) Reported on 2017   HERBALS      No current facility-administered medications on file prior to visit.   No Known Allergies  Immunization History   Administered Date(s) Administered     HepB 1997, 1998, 2002     Influenza (IIV3) PF 2013     Meningococcal (Menomune ) 2003     OPV, trivalent, live 1984, 1984, 1985, 10/06/1988     TDAP Vaccine (Boostrix) 2016     Tdap (Adacel,Boostrix) 2006       Obstetric History       T0      L0     SAB0   TAB0   Ectopic0   Multiple0   Live Births0      Past Medical History:   Diagnosis Date     Allergic rhinitis      Ruptured ear drum, right 2016     Past Surgical History:   Procedure Laterality Date     TONSILLECTOMY      2002     Family History   Problem Relation Age of Onset     Thyroid Disease Maternal Grandmother      DANIEL Maternal Grandfather      Social History     Social History     Marital status:      Spouse name: N/A     Number of children: N/A     Years of education: college     Occupational History     Student      lab, [employer]     Social History Main Topics     Smoking status: Light Tobacco Smoker     Packs/day: 0.50     Years: 10.00     Types: Cigarettes     Start date: 2006     Smokeless tobacco: Never Used     Alcohol use 0.6 - 1.8 oz/week     Drug use: No     Sexual activity: Yes     Partners: Male     Birth control/ protection: None      Comment: monogamous relationship since May 2013       Social History Narrative    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     How much exercise per week? Not much daily    How much calcium per day? 2-3 servings       How much caffeine per day? 1 cup coffee    How much vitamin D per day?   occ supplements    Do you/your family wear seatbelts?  Yes    Do you/your family use safety helmets? N/A    Do you/your family use sunscreen? Yes    Do you/your family keep firearms in the home? No    Do  you/your family have a smoke detector(s)? Yes        Do you feel safe in your home? Yes    Has anyone ever touched you in an unwanted manner? No     Explain         December 9, 2014 Ravinder Kuhn LPN    Reviewed cmckim lpn 1-4-2017                Review of Systems     Constitutional:  Negative for fever, chills, weight loss, weight gain, fatigue, decreased appetite, night sweats, recent stressors, height gain, height loss, post-operative complications, incisional pain, hallucinations, increased energy, hyperactivity and confused.   HENT:  Negative for ear pain, hearing loss, tinnitus, nosebleeds, trouble swallowing, hoarse voice, mouth sores, sore throat, ear discharge, tooth pain, gum tenderness, taste disturbance, smell disturbance, hearing aid, bleeding gums, dry mouth, sinus pain, sinus congestion and neck mass.    Eyes:  Negative for double vision, pain, redness, eye pain, decreased vision, eye watering, eye bulging, eye dryness, flashing lights, spots, floaters, strabismus, tunnel vision, jaundice and eye irritation.   Respiratory:   Negative for cough, hemoptysis, sputum production, shortness of breath, wheezing, sleep disturbances due to breathing, snores loudly, respiratory pain, dyspnea on exertion, cough disturbing sleep and postural dyspnea.    Cardiovascular:  Negative for chest pain, dyspnea on exertion, palpitations, orthopnea, claudication, leg swelling, fingers/toes turn blue, hypertension, hypotension, syncope, history of heart murmur, chest pain on exertion, chest pain at rest, pacemaker, few scattered varicosities, leg pain, sleep disturbances due to breathing, tachycardia, light-headedness, exercise intolerance and edema.   Gastrointestinal:  Negative for heartburn, nausea, vomiting, abdominal pain, diarrhea, constipation, blood in stool, melena, rectal pain, bloating, hemorrhoids, bowel incontinence, jaundice, rectal bleeding, coffee ground emesis and change in stool.   Genitourinary:  Positive  "for dysuria. Negative for bladder incontinence, urgency, hematuria, flank pain, vaginal discharge, difficulty urinating, genital sores, dyspareunia, decreased libido, nocturia, voiding less frequently, arousal difficulty, abnormal vaginal bleeding, excessive menstruation, menstrual changes, hot flashes, vaginal dryness and postmenopausal bleeding.   Musculoskeletal:  Negative for myalgias, back pain, joint swelling, arthralgias, stiffness, muscle cramps, neck pain, bone pain, muscle weakness and fracture.   Skin:  Positive for nail changes and skin thickening. Negative for itching, poor wound healing, rash, hair changes, skin changes, acne, warts, poor wound healing, scarring, flaky skin, Raynaud's phenomenon and sensitivity to sunlight.   Neurological:  Negative for dizziness, tingling, tremors, speech change, seizures, loss of consciousness, weakness, light-headedness, numbness, headaches, disturbances in coordination, extremity numbness, memory loss, difficulty walking and paralysis.   Endo/Heme:  Negative for anemia, swollen glands and bruises/bleeds easily.   Psychiatric/Behavioral:  Negative for depression, hallucinations, memory loss, decreased concentration, mood swings and panic attacks.    Breast:  Negative for breast discharge, breast mass, breast pain and nipple retraction.   Endocrine:  Negative for altered temperature regulation, polyphagia, polydipsia, unwanted hair growth and change in facial hair.    PHQ-9 SCORE 1/4/2017   Total Score 1     MARY-7 SCORE 2/1/2018   Total Score 1 (minimal anxiety)   Total Score 1     Answers for HPI/ROS submitted by the patient on 2/1/2018   MARY 7 TOTAL SCORE: 1  PHQ-2 Score: 0    EXAM:  Blood pressure 117/77, pulse 76, height 1.6 m (5' 3\"), weight 77 kg (169 lb 12.8 oz), last menstrual period 01/19/2018, not currently breastfeeding. Body mass index is 30.08 kg/(m^2).  General - pleasant female in no acute distress.  Skin - no suspicious lesions or rashes  EENT-  " PERRLA, euthyroid with out palpable nodules  Neck - supple without lymphadenopathy.  Lungs - clear to auscultation bilaterally.  Heart - regular rate and rhythm without murmur.  Abdomen - soft, nontender, nondistended, no masses or organomegaly noted.  Musculoskeletal - no gross deformities.  Neurological - normal strength, sensation, and mental status.    Breast Exam:  Breast: Without visible skin changes. No dimpling or lesions seen.   Breasts supple, non-tender with palpation, no dominant mass, nodularity, or nipple discharge noted bilaterally. Axillary nodes negative.      Pelvic Exam: patient declined       ASSESSMENT:  Encounter Diagnoses   Name Primary?     Visit for preventive health examination Yes     Encounter for preconception consultation         PLAN:   Preconception counseling provided, patient to continue prenatal vitamin and fish oil  Discussed safety of medications in pregnancy   Counseled Vianca on normal length of time to conceive   Patient to return to clinic if she has not conceived in one year     Encouraged flu vaccine, patient declined   Next PAP smear due 1/2022  Consider lipid and vitamin D screen at next annual exam as patient declined today     Additional teaching done at this visit regarding:   self breast exam, exercise, preconception, smoking cessation and weight/diet.    Continue care with Urology for evaluation of hematuria and dysuria. May try AZO urinary relief.     Return to clinic in one year.  Follow-up as needed.    I, Danii Tate, completed the PFSH and ROS. I then acted as a scribe for AMRITA Garcia, for the remainder of the visit.  Danii Tate RN NP Student     I agree with the PFSH and ROS as completed by the AMRITA Student, except for changes made by me.  The remainder of the encounter was performed by me and scribed by the AMRITA Student.  The scribed note accurately reflects my personal services and decisions made by me.  Lorena Mijares, DNP, APRN,  WHNP

## 2018-02-02 NOTE — MR AVS SNAPSHOT
After Visit Summary   2/2/2018    Vianca Dumas    MRN: [id number]           Patient Information     Date Of Birth          1985        Visit Information        Provider Department      2/2/2018 2:30 PM Lorena Mijares APRN [facility]        Today's Diagnoses     Visit for preventive health examination    -  1    Encounter for preconception consultation           Follow-ups after your visit        Follow-up notes from your care team     Return in 1 year (on 2/2/2019) for Preventative Health Visit.      Your next 10 appointments already scheduled     Feb 19, 2018  1:00 PM CST   (Arrive by 12:45 PM)   Return Visit with Estephanie Meek PA-C   [facility] Urology and [facility] for Prostate and Urologic Cancers ([facility])    [address] [place] [address] [phone]              Who to contact     Please call your clinic at [phone] to:    Ask questions about your health    Make or cancel appointments    Discuss your medicines    Learn about your test results    Speak to your doctor   If you have compliments or concerns about an experience at your clinic, or if you wish to file a complaint, please contact [facility] Patient Relations at [phone] or email us at [email or web address]         Additional Information About Your Visit        MyChart Information     "[employer]" gives you secure access to your electronic health record. If you see a primary care provider, you can also send messages to your care team and make appointments. If you have questions, please call your primary care clinic.  If you do not have a primary care provider, please call [phone] and they will assist you.      "[employer]" is an electronic gateway that provides easy, online access to your medical records. With "[employer]", you can request a clinic appointment, read your test results, renew a  "prescription or communicate with your care team.     To access your existing account, please contact your [facility] or call [phone] for assistance.        Care EveryWhere ID     This is your Care EveryWhere ID. This could be used by other organizations to access your [place] medical records  [id number]        Your Vitals Were     Pulse Height Last Period Breastfeeding? BMI (Body Mass Index)       76 1.6 m (5' 3\") 01/19/2018 No 30.08 kg/m2        Blood Pressure from Last 3 Encounters:   02/02/18 117/77   11/17/17 122/73   10/16/17 119/81    Weight from Last 3 Encounters:   02/02/18 77 kg (169 lb 12.8 oz)   11/17/17 75 kg (165 lb 6.4 oz)   10/16/17 75.8 kg (167 lb)              Today, you had the following     No orders found for display       Primary Care Provider    None Specified       No primary provider on file.        Equal Access to Services     [facility] : Hadii filomena Saleh, wasandrada zonia, kasandrata kaalmada reg, zainab bains . So [facility] [phone].    ATENCIÓN: Si habla español, tiene a car disposición servicios gratuitos de asistencia lingüística. Llame al [phone].    We comply with applicable federal civil rights laws and [place] laws. We do not discriminate on the basis of race, color, national origin, age, disability, sex, sexual orientation, or gender identity.            Thank you!     Thank you for choosing [facility]  for your care. Our goal is always to provide you with excellent care. Hearing back from our patients is one way we can continue to improve our services. Please take a few minutes to complete the written survey that you may receive in the mail after your visit with us. Thank you!             Your Updated Medication List - Protect others around you: Learn how to safely use, store and throw away your medicines at www.disposemymeds.org.          This list is accurate as of " 2/2/18  3:33 PM.  Always use your most recent med list.                   Brand Name Dispense Instructions for use Diagnosis    FISH OIL PO      Take by mouth daily        HERBALS           loratadine 10 MG tablet    CLARITIN     Take 10 mg by mouth daily Reported on 4/19/2017        PRENATAL PO      Take by mouth daily        SUDAFED PO      Reported on 4/19/2017

## 2018-02-05 ENCOUNTER — PRE VISIT (OUTPATIENT)
Dept: UROLOGY | Facility: CLINIC | Age: 33
End: 2018-02-05

## 2018-02-05 NOTE — TELEPHONE ENCOUNTER
Patient is coming in to see Estephanie Meek PA-C for a 3 month f/u for microhematuria, no need for a call.

## 2018-02-26 ENCOUNTER — OFFICE VISIT (OUTPATIENT)
Dept: UROLOGY | Facility: CLINIC | Age: 33
End: 2018-02-26
Payer: COMMERCIAL

## 2018-02-26 VITALS
BODY MASS INDEX: 29.95 KG/M2 | DIASTOLIC BLOOD PRESSURE: 80 MMHG | HEART RATE: 96 BPM | HEIGHT: 63 IN | SYSTOLIC BLOOD PRESSURE: 120 MMHG | WEIGHT: 169 LBS

## 2018-02-26 DIAGNOSIS — R31.29 MICROSCOPIC HEMATURIA: Primary | ICD-10-CM

## 2018-02-26 LAB
ALBUMIN UR-MCNC: NEGATIVE MG/DL
APPEARANCE UR: ABNORMAL
BILIRUB UR QL STRIP: NEGATIVE
CAOX CRY #/AREA URNS HPF: ABNORMAL /HPF
COLOR UR AUTO: YELLOW
GLUCOSE UR STRIP-MCNC: NEGATIVE MG/DL
HGB UR QL STRIP: ABNORMAL
KETONES UR STRIP-MCNC: 5 MG/DL
LEUKOCYTE ESTERASE UR QL STRIP: NEGATIVE
MUCOUS THREADS #/AREA URNS LPF: PRESENT /LPF
NITRATE UR QL: NEGATIVE
PH UR STRIP: 5 PH (ref 5–7)
RBC #/AREA URNS AUTO: 12 /HPF (ref 0–2)
SOURCE: ABNORMAL
SP GR UR STRIP: 1.02 (ref 1–1.03)
SQUAMOUS #/AREA URNS AUTO: 3 /HPF (ref 0–1)
UROBILINOGEN UR STRIP-MCNC: 0 MG/DL (ref 0–2)
WBC #/AREA URNS AUTO: 2 /HPF (ref 0–2)

## 2018-02-26 ASSESSMENT — PAIN SCALES - GENERAL: PAINLEVEL: NO PAIN (0)

## 2018-02-26 NOTE — PROGRESS NOTES
"Urology Office Visit - Follow Up    Reason for Visit: follow up microhematuria    HPI: Ms. Vianca Duams is a pleasant 33 year old female who returns for follow up of microhematuria. She is s/p negative urologic workup in the fall of 2017. Diagnostics outlined below. She returns today for follow up. Denies hematuria in the interim. No recent UTI's. Staying well hydrated which seems to help with mild urethral irritation/itching. No changes in her health since last visit.    Diagnostics:   CT ABDOMEN PELVIS HEMATURIA W/WO IV CONTRAST, 10/16/2017  IMPRESSION: No findings to explain the patient's microscopic  hematuria. Negative for nephroureterolithiasis. No evidence for  urothelial malignancy.     November 17, 2017 CYSTOSCOPY with Dr. Christine  The anterior urethra, membranous urethra, and bladder neck were negative.   Within the urinary bladder, there was no evidence of stones, tumors, or growths.  The ureteral orifices were well visualized bilaterally and found to have clear efflux of urine.   The patient had minimal trabeculation.  On retroflex view, no lesions were seen.    PEx  /80  Pulse 96  Ht 1.6 m (5' 3\")  Wt 76.7 kg (169 lb)  BMI 29.94 kg/m2  GEN: well-appearing female in NAD  RESP: no increased respiratory effort    A/P  32 yo F with h/o microscopic hematuria s/p negative urologic workup in the fall of 2017 with negative CT urogram and negative cystoscopy. No gross hematuria.   -Send urine for UA/UC, cytology today.    Follow up in 1 year with the above, sooner with any gross hematuria.    -Patients with asymptomatic microscopic hematuria should have annual urinalysis to evaluate for persistent microscopic hematuria.  If there is no microscopic hematuria on two consecutive urinalyses then no further evaluation is necessary.  In patients with persistent or recurrent microscopic hematuria repeat evaluation within 3-5 years should be considered.    American Urologic Association Asymptomatic " Hematuria Guideline Statement 2012    10 minutes spent with the patient, >50% in counseling and coordination of care.    Estephanie Meek PA-C  Department of Urology

## 2018-02-26 NOTE — NURSING NOTE
Chief Complaint   Patient presents with     RECHECK     3 month f/u for microhematuria       Nydia Arango MA

## 2018-02-26 NOTE — LETTER
"2/26/2018       RE: Vianca Dumas  [id number]  [place] [address]     Dear Colleague,    Thank you for referring your patient, Vianca Dumas, to the [facility] UROLOGY AND INST FOR PROSTATE AND UROLOGIC CANCERS at [facility]. Please see a copy of my visit note below.    Urology Office Visit - Follow Up    Reason for Visit: follow up microhematuria    HPI: Ms. Vianca Dumas is a pleasant 33 year old female who returns for follow up of microhematuria. She is s/p negative urologic workup in the fall of 2017. Diagnostics outlined below. She returns today for follow up. Denies hematuria in the interim. No recent UTI's. Staying well hydrated which seems to help with mild urethral irritation/itching. No changes in her health since last visit.    Diagnostics:   CT ABDOMEN PELVIS HEMATURIA W/WO IV CONTRAST, 10/16/2017  IMPRESSION: No findings to explain the patient's microscopic  hematuria. Negative for nephroureterolithiasis. No evidence for  urothelial malignancy.     November 17, 2017 CYSTOSCOPY with Dr. Christine  The anterior urethra, membranous urethra, and bladder neck were negative.   Within the urinary bladder, there was no evidence of stones, tumors, or growths.  The ureteral orifices were well visualized bilaterally and found to have clear efflux of urine.   The patient had minimal trabeculation.  On retroflex view, no lesions were seen.    PEx  /80  Pulse 96  Ht 1.6 m (5' 3\")  Wt 76.7 kg (169 lb)  BMI 29.94 kg/m2  GEN: well-appearing female in NAD  RESP: no increased respiratory effort    A/P  32 yo F with h/o microscopic hematuria s/p negative urologic workup in the fall of 2017 with negative CT urogram and negative cystoscopy. No gross hematuria.   -Send urine for UA/UC, cytology today.    Follow up in 1 year with the above, sooner with any gross hematuria.    -Patients with asymptomatic microscopic hematuria should have annual urinalysis " to evaluate for persistent microscopic hematuria.  If there is no microscopic hematuria on two consecutive urinalyses then no further evaluation is necessary.  In patients with persistent or recurrent microscopic hematuria repeat evaluation within 3-5 years should be considered.    American Urologic Association Asymptomatic Hematuria Guideline Statement 2012    10 minutes spent with the patient, >50% in counseling and coordination of care.      Again, thank you for allowing me to participate in the care of your patient.      Sincerely,    Estephanie Meek PA-C

## 2018-02-26 NOTE — MR AVS SNAPSHOT
"              After Visit Summary   2/26/2018    Vianca Dumas    MRN: [id number]           Patient Information     Date Of Birth          1985        Visit Information        Provider Department      2/26/2018 1:30 PM Estephanie Meek PA-C [facility] Urology and [facility] for Prostate and Urologic Cancers        Today's Diagnoses     Microscopic hematuria    -  1       Follow-ups after your visit        Who to contact     Please call your clinic at [phone] to:    Ask questions about your health    Make or cancel appointments    Discuss your medicines    Learn about your test results    Speak to your doctor            Additional Information About Your Visit        MyChart Information     [employer] gives you secure access to your electronic health record. If you see a primary care provider, you can also send messages to your care team and make appointments. If you have questions, please call your primary care clinic.  If you do not have a primary care provider, please call [phone] and they will assist you.      [employer] is an electronic gateway that provides easy, online access to your medical records. With [employer], you can request a clinic appointment, read your test results, renew a prescription or communicate with your care team.     To access your existing account, please contact your [facility] or call [phone] for assistance.        Care EveryWhere ID     This is your Care EveryWhere ID. This could be used by other organizations to access your [facility] medical records  [id number]        Your Vitals Were     Pulse Height BMI (Body Mass Index)             96 1.6 m (5' 3\") 29.94 kg/m2          Blood Pressure from Last 3 Encounters:   02/26/18 120/80   02/02/18 117/77   11/17/17 122/73    Weight from Last 3 Encounters:   02/26/18 76.7 kg (169 lb)   02/02/18 77 kg (169 lb 12.8 oz)   11/17/17 75 kg (165 lb 6.4 oz)              We Performed the Following     Cytology " non gyn     Routine UA with micro reflex to culture        Primary Care Provider    None Specified       No primary provider on file.        Equal Access to Services     SHELLEY SAVAGE : Hadii filomena Saleh, connie brar, kasandrarosalio benitezdanyelfernanda finney, zainab dardenmelitonguy billy. So [facility] [phone].    ATENCIÓN: Si habla español, tiene a car disposición servicios gratuitos de asistencia lingüística. Llame al [phone].    We comply with applicable federal civil rights laws and [place] laws. We do not discriminate on the basis of race, color, national origin, age, disability, sex, sexual orientation, or gender identity.            Thank you!     Thank you for choosing [facility] UROLOGY AND [facility] FOR PROSTATE AND UROLOGIC CANCERS  for your care. Our goal is always to provide you with excellent care. Hearing back from our patients is one way we can continue to improve our services. Please take a few minutes to complete the written survey that you may receive in the mail after your visit with us. Thank you!             Your Updated Medication List - Protect others around you: Learn how to safely use, store and throw away your medicines at www.disposemymeds.org.          This list is accurate as of 2/26/18  1:49 PM.  Always use your most recent med list.                   Brand Name Dispense Instructions for use Diagnosis    FISH OIL PO      Take by mouth daily        HERBALS           loratadine 10 MG tablet    CLARITIN     Take 10 mg by mouth daily Reported on 4/19/2017        PRENATAL PO      Take by mouth daily        SUDAFED PO      Reported on 4/19/2017

## 2018-02-27 LAB — COPATH REPORT: NORMAL

## 2018-05-07 ENCOUNTER — TELEPHONE (OUTPATIENT)
Dept: OBGYN | Facility: CLINIC | Age: 33
End: 2018-05-07

## 2018-05-07 DIAGNOSIS — Z34.91 NORMAL PREGNANCY IN FIRST TRIMESTER: Primary | ICD-10-CM

## 2018-05-07 NOTE — TELEPHONE ENCOUNTER
Patient called and would like to establish prenatal care   Patient LMP 4/8/18  Patient G1 P  Patient complains of nothing at this time   Patient is taking prenatal vitamins.  Orders for dating ultrasound entered.

## 2018-05-08 ENCOUNTER — MYC MEDICAL ADVICE (OUTPATIENT)
Dept: OBGYN | Facility: CLINIC | Age: 33
End: 2018-05-08

## 2018-05-09 NOTE — TELEPHONE ENCOUNTER
Left VM for pt to call back to discuss pain in left lower pelvic region noted in her mychart message.

## 2018-06-05 ENCOUNTER — OFFICE VISIT (OUTPATIENT)
Dept: OBGYN | Facility: CLINIC | Age: 33
End: 2018-06-05
Attending: ADVANCED PRACTICE MIDWIFE
Payer: COMMERCIAL

## 2018-06-05 VITALS
SYSTOLIC BLOOD PRESSURE: 123 MMHG | HEIGHT: 63 IN | WEIGHT: 172 LBS | DIASTOLIC BLOOD PRESSURE: 84 MMHG | HEART RATE: 80 BPM | BODY MASS INDEX: 30.48 KG/M2

## 2018-06-05 DIAGNOSIS — Z34.91 NORMAL PREGNANCY IN FIRST TRIMESTER: ICD-10-CM

## 2018-06-05 DIAGNOSIS — Z34.01 ENCOUNTER FOR SUPERVISION OF NORMAL FIRST PREGNANCY IN FIRST TRIMESTER: Primary | ICD-10-CM

## 2018-06-05 PROBLEM — N76.0 BV (BACTERIAL VAGINOSIS): Status: RESOLVED | Noted: 2017-04-19 | Resolved: 2018-06-05

## 2018-06-05 PROBLEM — B96.89 BV (BACTERIAL VAGINOSIS): Status: RESOLVED | Noted: 2017-04-19 | Resolved: 2018-06-05

## 2018-06-05 PROBLEM — R31.9 HEMATURIA: Status: ACTIVE | Noted: 2018-06-05

## 2018-06-05 LAB
ABO + RH BLD: NORMAL
ABO + RH BLD: NORMAL
BASOPHILS # BLD AUTO: 0 10E9/L (ref 0–0.2)
BASOPHILS NFR BLD AUTO: 0.3 %
BLD GP AB SCN SERPL QL: NORMAL
BLOOD BANK CMNT PATIENT-IMP: NORMAL
DEPRECATED CALCIDIOL+CALCIFEROL SERPL-MC: 26 UG/L (ref 20–75)
DIFFERENTIAL METHOD BLD: ABNORMAL
EOSINOPHIL # BLD AUTO: 0.2 10E9/L (ref 0–0.7)
EOSINOPHIL NFR BLD AUTO: 1.3 %
ERYTHROCYTE [DISTWIDTH] IN BLOOD BY AUTOMATED COUNT: 12.7 % (ref 10–15)
HBV SURFACE AB SERPL IA-ACNC: >1000 M[IU]/ML
HBV SURFACE AG SERPL QL IA: NONREACTIVE
HCT VFR BLD AUTO: 41.7 % (ref 35–47)
HGB BLD-MCNC: 13.9 G/DL (ref 11.7–15.7)
HIV 1+2 AB+HIV1 P24 AG SERPL QL IA: NONREACTIVE
IMM GRANULOCYTES # BLD: 0 10E9/L (ref 0–0.4)
IMM GRANULOCYTES NFR BLD: 0.3 %
LYMPHOCYTES # BLD AUTO: 2.6 10E9/L (ref 0.8–5.3)
LYMPHOCYTES NFR BLD AUTO: 21.7 %
MCH RBC QN AUTO: 30.2 PG (ref 26.5–33)
MCHC RBC AUTO-ENTMCNC: 33.3 G/DL (ref 31.5–36.5)
MCV RBC AUTO: 91 FL (ref 78–100)
MONOCYTES # BLD AUTO: 0.5 10E9/L (ref 0–1.3)
MONOCYTES NFR BLD AUTO: 4.6 %
NEUTROPHILS # BLD AUTO: 8.5 10E9/L (ref 1.6–8.3)
NEUTROPHILS NFR BLD AUTO: 71.8 %
NRBC # BLD AUTO: 0 10*3/UL
NRBC BLD AUTO-RTO: 0 /100
PLATELET # BLD AUTO: 277 10E9/L (ref 150–450)
RBC # BLD AUTO: 4.61 10E12/L (ref 3.8–5.2)
RUBV IGG SERPL IA-ACNC: 43 IU/ML
SPECIMEN EXP DATE BLD: NORMAL
T PALLIDUM AB SER QL: NONREACTIVE
WBC # BLD AUTO: 11.8 10E9/L (ref 4–11)

## 2018-06-05 PROCEDURE — 86706 HEP B SURFACE ANTIBODY: CPT | Performed by: ADVANCED PRACTICE MIDWIFE

## 2018-06-05 PROCEDURE — 86780 TREPONEMA PALLIDUM: CPT | Performed by: ADVANCED PRACTICE MIDWIFE

## 2018-06-05 PROCEDURE — 36415 COLL VENOUS BLD VENIPUNCTURE: CPT | Performed by: ADVANCED PRACTICE MIDWIFE

## 2018-06-05 PROCEDURE — G0463 HOSPITAL OUTPT CLINIC VISIT: HCPCS | Mod: ZF

## 2018-06-05 PROCEDURE — 86850 RBC ANTIBODY SCREEN: CPT | Performed by: ADVANCED PRACTICE MIDWIFE

## 2018-06-05 PROCEDURE — 76817 TRANSVAGINAL US OBSTETRIC: CPT | Mod: ZF

## 2018-06-05 PROCEDURE — 87086 URINE CULTURE/COLONY COUNT: CPT | Performed by: ADVANCED PRACTICE MIDWIFE

## 2018-06-05 PROCEDURE — 82306 VITAMIN D 25 HYDROXY: CPT | Performed by: ADVANCED PRACTICE MIDWIFE

## 2018-06-05 PROCEDURE — 86762 RUBELLA ANTIBODY: CPT | Performed by: ADVANCED PRACTICE MIDWIFE

## 2018-06-05 PROCEDURE — 85025 COMPLETE CBC W/AUTO DIFF WBC: CPT | Performed by: ADVANCED PRACTICE MIDWIFE

## 2018-06-05 PROCEDURE — 87389 HIV-1 AG W/HIV-1&-2 AB AG IA: CPT | Performed by: ADVANCED PRACTICE MIDWIFE

## 2018-06-05 PROCEDURE — 86901 BLOOD TYPING SEROLOGIC RH(D): CPT | Performed by: ADVANCED PRACTICE MIDWIFE

## 2018-06-05 PROCEDURE — 86900 BLOOD TYPING SEROLOGIC ABO: CPT | Performed by: ADVANCED PRACTICE MIDWIFE

## 2018-06-05 PROCEDURE — 87340 HEPATITIS B SURFACE AG IA: CPT | Performed by: ADVANCED PRACTICE MIDWIFE

## 2018-06-05 NOTE — MR AVS SNAPSHOT
After Visit Summary   6/5/2018    Vianca Valle    MRN: [id number]           Patient Information     Date Of Birth          1985        Visit Information        Provider Department      6/5/2018 8:30 AM [facility] ULTRASOUND [facility]        Today's Diagnoses     Normal pregnancy in first trimester           Follow-ups after your visit        Your next 10 appointments already scheduled     Jun 21, 2018  3:30 PM CDT   NEW OB with Marialuisa Moura CNM   [facility] ([facility])    [facility] [address] [address]  [address]  [facility] [id number] [phone]              Who to contact     Please call your clinic at [phone] to:    Ask questions about your health    Make or cancel appointments    Discuss your medicines    Learn about your test results    Speak to your doctor            Additional Information About Your Visit        MyChart Information     [employer] gives you secure access to your electronic health record. If you see a primary care provider, you can also send messages to your care team and make appointments. If you have questions, please call your primary care clinic.  If you do not have a primary care provider, please call [phone] and they will assist you.      [employer] is an electronic gateway that provides easy, online access to your medical records. With [employer], you can request a clinic appointment, read your test results, renew a prescription or communicate with your care team.     To access your existing account, please contact your [facility] or call [phone] for assistance.        Care EveryWhere ID     This is your Care EveryWhere ID. This could be used by other organizations to access your [facility] medical records  [id number]        Your Vitals Were     Last Period                   01/19/2018            Blood Pressure from Last 3  Encounters:   06/05/18 123/84   02/26/18 120/80   02/02/18 117/77    Weight from Last 3 Encounters:   06/05/18 78 kg (172 lb)   02/26/18 76.7 kg (169 lb)   02/02/18 77 kg (169 lb 12.8 oz)              We Performed the Following     Dating ultrasound less than 14 weeks gestation Transvag  - 03[address] (In Clinic)        Primary Care Provider    None Specified       No primary provider on file.        Equal Access to Services     SHELLEY SAVAGE : Hadii filomena ku hadjemalo Soanaliali, waaxda luqadaha, qaybta kaalmada adeegyada, zainab bains . So [facility] [phone].    ATENCIÓN: Si habla español, tiene a car disposición servicios gratuitos de asistencia lingüística. Ll[facility] [phone].    We comply with applicable federal civil rights laws and [place] laws. We do not discriminate on the basis of race, color, national origin, age, disability, sex, sexual orientation, or gender identity.            Thank you!     Thank you for choosing [facility]  for your care. Our goal is always to provide you with excellent care. Hearing back from our patients is one way we can continue to improve our services. Please take a few minutes to complete the written survey that you may receive in the mail after your visit with us. Thank you!             Your Updated Medication List - Protect others around you: Learn how to safely use, store and throw away your medicines at www.disposemymeds.org.          This list is accurate as of 6/5/18  1:05 PM.  Always use your most recent med list.                   Brand Name Dispense Instructions for use Diagnosis    FISH OIL PO      Take by mouth daily        HERBALS           loratadine 10 MG tablet    CLARITIN     Take 10 mg by mouth daily Reported on 4/19/2017        PRENATAL PO      Take by mouth daily        probiotic Caps           SUDAFED PO      Reported on 4/19/2017

## 2018-06-05 NOTE — LETTER
2018       RE: Vianca aKurmilagros  [id number]  [place] [address]     Dear Colleague,    Thank you for referring your patient, Vianca Valle, to the [facility] at [facility]. Please see a copy of my visit note below.    33 year old female, , with LMP 18, 8 2/7 weeks,  Estimated Date of Delivery: 19  presents for confirmation of dates and assessment of viability. This study was done transvaginally.    Measurements     CRL = 18.2 mm = 8 2/7 weeks  EGA.   ELICEO = 19.     Fetal anatomy appears normal for gestational age.     Fetal/Fetal Cardiac Activity: Present.  FHR = 176bpm.     Implantation: Intrauterine.     Cervix = 31.7 cm      Maternal structures appear normal with a left ovarian hemorrhagic cyst measuring 2.1 x 1.9 x 1.6cm    Impression: Single viable intrauterine pregnancy at 8w2d by LMP c/w today's scan.  Use ELICEO of 19 based on LMP.    Recommend comprehensive scan at 18 to 20 weeks.    DEBRA Clifford MD

## 2018-06-05 NOTE — MR AVS SNAPSHOT
After Visit Summary   6/5/2018    Vianca Valle    MRN: [id number]           Patient Information     Date Of Birth          1985        Visit Information        Provider Department      6/5/2018 9:00 AM Edvin Burton APRN CN [facility]        Today's Diagnoses     Encounter for supervision of normal first pregnancy in first trimester    -  1       Follow-ups after your visit        Follow-up notes from your care team     Return in about 2 weeks (around 6/19/2018) for New OB Visit.      Your next 10 appointments already scheduled     Jun 21, 2018  3:30 PM CDT   NEW OB with Marialuisa Moura CNM   [facility] ([facility])    [facility] [address]  [address]  [facility] [address] [phone]              Who to contact     Please call your clinic at [phone] to:    Ask questions about your health    Make or cancel appointments    Discuss your medicines    Learn about your test results    Speak to your doctor            Additional Information About Your Visit        [employer] Information     [employer] gives you secure access to your electronic health record. If you see a primary care provider, you can also send messages to your care team and make appointments. If you have questions, please call your primary care clinic.  If you do not have a primary care provider, please call [phone] and they will assist you.      [employer] is an electronic gateway that provides easy, online access to your medical records. With [employer], you can request a clinic appointment, read your test results, renew a prescription or communicate with your care team.     To access your existing account, please contact your [facility] or call [phone] for assistance.        Care EveryWhere ID     This is your Care EveryWhere ID. This could be used by other organizations to access  "your [place] medical records  [id number]        Your Vitals Were     Pulse Height Last Period BMI (Body Mass Index)          80 1.6 m (5' 3\") 01/19/2018 30.47 kg/m2         Blood Pressure from Last 3 Encounters:   06/05/18 123/84   02/26/18 120/80   02/02/18 117/77    Weight from Last 3 Encounters:   06/05/18 78 kg (172 lb)   02/26/18 76.7 kg (169 lb)   02/02/18 77 kg (169 lb 12.8 oz)              We Performed the Following     25- OH-Vitamin D     ABO/Rh Type and Screen     CBC with Platelets Differential     Hepatitis B Surface Antibody     Hepatitis B Surface Antigen     HIV Antigen Antibody Combo     Rubella Antibody IgG Quantitative     Treponema Abs w Reflex to RPR and Titer     Urine Culture Aerobic Bacterial        Primary Care Provider    None Specified       No primary provider on file.        Equal Access to Services     [facility] : Shira Saleh, connie brar, isidro finney, zainab bains . So [facility] [phone].    ATENCIÓN: Si habla español, tiene a car disposición servicios gratuitos de asistencia lingüística. [facility] [phone].    We comply with applicable federal civil rights laws and [place] laws. We do not discriminate on the basis of race, color, national origin, age, disability, sex, sexual orientation, or gender identity.            Thank you!     Thank you for choosing [facility]  for your care. Our goal is always to provide you with excellent care. Hearing back from our patients is one way we can continue to improve our services. Please take a few minutes to complete the written survey that you may receive in the mail after your visit with us. Thank you!             Your Updated Medication List - Protect others around you: Learn how to safely use, store and throw away your medicines at www.disposemymeds.org.          This list is accurate as of 6/5/18  9:42 AM.  Always use your most recent med list.    "                Brand Name Dispense Instructions for use Diagnosis    FISH OIL PO      Take by mouth daily        HERBALS           loratadine 10 MG tablet    CLARITIN     Take 10 mg by mouth daily Reported on 4/19/2017        PRENATAL PO      Take by mouth daily        probiotic Caps           SUDAFED PO      Reported on 4/19/2017

## 2018-06-05 NOTE — LETTER
2018       RE: Vianca Valle  [id number]  [place] [address]     Dear Colleague,    Thank you for referring your patient, Vianca Valle, to the [facility] at [facility]. Please see a copy of my visit note below.    S OB Intake note  Subjective   33 year old woman presents to clinic for initiation of OB care.  Patient's last menstrual period was 2018.  at 8w2d by Estimated Date of Delivery: 2019 based on LMP.    Reviewed dating ultrasound.  Pregnancy is planned.  Symptoms since LMP include nausea, but no vomiting and appetite good.  Patient has tried these relief measures: diet modification.    - Genetic/Infection questionnaire completed, risks include maternal cousin with Autism  - Have you traveled during the pregnancy? No  - Have your sexual partner(s) travelled during the pregnancy? No    - Current Medications  Current Outpatient Prescriptions   Medication Sig Dispense Refill     Omega-3 Fatty Acids (FISH OIL PO) Take by mouth daily       Prenatal Vit-Fe Fumarate-FA (PRENATAL PO) Take by mouth daily       probiotic CAPS        - Co-morbids  Past Medical History:   Diagnosis Date     Allergic rhinitis      Hematuria 2017     Ruptured ear drum, right 2016     - Risk for GDM -  No risk identified  - The patient does not h/o Pre Eclampsia, Current multi fetal gestation, Pre Gestational Diabetes (Type 1 or Type 2), chronic hypertension, renal disease, Autoimmune disease (systematic lupus erythematosus, antiphospholipid syndrome) so WILL NOT start low dose aspirin (81mg) starting between 12 and 28 weeks to prevent preeclampsia.  - The patient  does not have a history of spontaneous  birth so  WILL NOT consider progesterone starting at 16-20 weeks and/or serial transvaginal cervical length ultrasounds from 16-24 weeks.      PERSONAL/SOCIAL HISTORY  Lives with their spouse. Bartolo TRAN, peter and  supportive.  since September 2017.  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  Additional items: None    Objective  -VS: reviewed and within normal limits   -General appearance: no acute distress, patient is comfortable   NEUROLOGICAL/PSYCHIATRIC   - Orientated x 3   -Mood and affect: normal     Assessment/Plan  IUP at 8 weeks 2 days by LMP c/w ultrasound    Orders Placed This Encounter   Procedures     25- OH-Vitamin D     CBC with Platelets Differential     Hepatitis B Surface Antigen     HIV Antigen Antibody Combo     Rubella Antibody IgG Quantitative     Treponema Abs w Reflex to RPR and Titer     Hepatitis B Surface Antibody     ABO/Rh Type and Screen     - Oriented to Practice, types of care, and how to reach a provider.  Discussed CNM vs. MD care, patient and FOB undecided at this time.  - Patient received 1st trimester new OB education packet complete with aide of The Expectant Family booklet including information on genetic screening test options.    - Patient would like to disucss options further at new OB visit.  - Patient was encouraged to start prenatal vitamins as tolerated.    - Patient was sent to lab for routine OB labs.     - Pregnancy concerns to be addressed by provider at new OB exam include: needs GC/CT, pap up to date, discuss genetic screening further.  - Pt to RTO for NOB visit in 2-3 weeks and prn if questions or concerns    Again, thank you for allowing me to participate in the care of your patient.      Sincerely,    AMANDA Coon CNM

## 2018-06-05 NOTE — PROGRESS NOTES
33 year old female, , with LMP 18, 8 2/7 weeks,  Estimated Date of Delivery: 19  presents for confirmation of dates and assessment of viability. This study was done transvaginally.    Measurements     CRL = 18.2 mm = 8 2/7 weeks  EGA.   ELICEO = 19.     Fetal anatomy appears normal for gestational age.     Fetal/Fetal Cardiac Activity: Present.  FHR = 176bpm.     Implantation: Intrauterine.     Cervix = 31.7 cm      Maternal structures appear normal with a left ovarian hemorrhagic cyst measuring 2.1 x 1.9 x 1.6cm    Impression: Single viable intrauterine pregnancy at 8w2d by LMP c/w today's scan.  Use ELICEO of 19 based on LMP.    Recommend comprehensive scan at 18 to 20 weeks.    DEBRA Clifford MD

## 2018-06-05 NOTE — PROGRESS NOTES
[facility] OB Intake note  Subjective   33 year old woman presents to clinic for initiation of OB care.  Patient's last menstrual period was 2018.  at 8w2d by Estimated Date of Delivery: 2019 based on LMP.    Reviewed dating ultrasound.  Pregnancy is planned.  Symptoms since LMP include nausea, but no vomiting and appetite good.  Patient has tried these relief measures: diet modification.    - Genetic/Infection questionnaire completed, risks include maternal cousin with Autism  - Have you traveled during the pregnancy? No  - Have your sexual partner(s) travelled during the pregnancy? No    - Current Medications  Current Outpatient Prescriptions   Medication Sig Dispense Refill     Omega-3 Fatty Acids (FISH OIL PO) Take by mouth daily       Prenatal Vit-Fe Fumarate-FA (PRENATAL PO) Take by mouth daily       probiotic CAPS        - Co-morbids  Past Medical History:   Diagnosis Date     Allergic rhinitis      Hematuria 2017     Ruptured ear drum, right 2016     - Risk for GDM -  No risk identified  - The patient does not h/o Pre Eclampsia, Current multi fetal gestation, Pre Gestational Diabetes (Type 1 or Type 2), chronic hypertension, renal disease, Autoimmune disease (systematic lupus erythematosus, antiphospholipid syndrome) so WILL NOT start low dose aspirin (81mg) starting between 12 and 28 weeks to prevent preeclampsia.  - The patient  does not have a history of spontaneous  birth so  WILL NOT consider progesterone starting at 16-20 weeks and/or serial transvaginal cervical length ultrasounds from 16-24 weeks.      PERSONAL/SOCIAL HISTORY  Lives with their spouse. Bartolo TRAN, involved and supportive.  since 2017.  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  Additional items: None    Objective  -VS: reviewed and within normal limits   -General appearance: no acute distress, patient is comfortable   NEUROLOGICAL/PSYCHIATRIC   - Orientated  x 3   -Mood and affect: normal     Assessment/Plan  IUP at 8 weeks 2 days by LMP c/w ultrasound    Orders Placed This Encounter   Procedures     25- OH-Vitamin D     CBC with Platelets Differential     Hepatitis B Surface Antigen     HIV Antigen Antibody Combo     Rubella Antibody IgG Quantitative     Treponema Abs w Reflex to RPR and Titer     Hepatitis B Surface Antibody     ABO/Rh Type and Screen     - Oriented to Practice, types of care, and how to reach a provider.  Discussed CNM vs. MD care, patient and FOB undecided at this time.  - Patient received 1st trimester new OB education packet complete with aide of The Expectant Family booklet including information on genetic screening test options.    - Patient would like to disucss options further at new OB visit.  - Patient was encouraged to start prenatal vitamins as tolerated.    - Patient was sent to lab for routine OB labs.     - Pregnancy concerns to be addressed by provider at new OB exam include: needs GC/CT, pap up to date, discuss genetic screening further.  - Pt to RTO for NOB visit in 2-3 weeks and prn if questions or concerns

## 2018-06-06 LAB
BACTERIA SPEC CULT: NO GROWTH
Lab: NORMAL
SPECIMEN SOURCE: NORMAL

## 2018-06-18 ASSESSMENT — ENCOUNTER SYMPTOMS
BACK PAIN: 1
STIFFNESS: 1

## 2018-06-18 ASSESSMENT — ANXIETY QUESTIONNAIRES
1. FEELING NERVOUS, ANXIOUS, OR ON EDGE: SEVERAL DAYS
GAD7 TOTAL SCORE: 3
5. BEING SO RESTLESS THAT IT IS HARD TO SIT STILL: NOT AT ALL
2. NOT BEING ABLE TO STOP OR CONTROL WORRYING: NOT AT ALL
4. TROUBLE RELAXING: NOT AT ALL
7. FEELING AFRAID AS IF SOMETHING AWFUL MIGHT HAPPEN: NOT AT ALL
6. BECOMING EASILY ANNOYED OR IRRITABLE: SEVERAL DAYS
3. WORRYING TOO MUCH ABOUT DIFFERENT THINGS: SEVERAL DAYS
GAD7 TOTAL SCORE: 3
7. FEELING AFRAID AS IF SOMETHING AWFUL MIGHT HAPPEN: NOT AT ALL

## 2018-06-19 ASSESSMENT — ANXIETY QUESTIONNAIRES: GAD7 TOTAL SCORE: 3

## 2018-06-21 ENCOUNTER — OFFICE VISIT (OUTPATIENT)
Dept: OBGYN | Facility: CLINIC | Age: 33
End: 2018-06-21
Attending: ADVANCED PRACTICE MIDWIFE
Payer: COMMERCIAL

## 2018-06-21 VITALS
HEART RATE: 80 BPM | DIASTOLIC BLOOD PRESSURE: 78 MMHG | BODY MASS INDEX: 30.53 KG/M2 | HEIGHT: 63 IN | SYSTOLIC BLOOD PRESSURE: 115 MMHG | WEIGHT: 172.3 LBS

## 2018-06-21 DIAGNOSIS — Z34.01 ENCOUNTER FOR SUPERVISION OF NORMAL FIRST PREGNANCY IN FIRST TRIMESTER: Primary | ICD-10-CM

## 2018-06-21 DIAGNOSIS — Z36.89 ENCOUNTER FOR FETAL ANATOMIC SURVEY: ICD-10-CM

## 2018-06-21 DIAGNOSIS — Z36.82 ENCOUNTER FOR NUCHAL TRANSLUCENCY TESTING: ICD-10-CM

## 2018-06-21 PROCEDURE — G0463 HOSPITAL OUTPT CLINIC VISIT: HCPCS | Mod: ZF

## 2018-06-21 PROCEDURE — 87591 N.GONORRHOEAE DNA AMP PROB: CPT | Performed by: ADVANCED PRACTICE MIDWIFE

## 2018-06-21 PROCEDURE — 87491 CHLMYD TRACH DNA AMP PROBE: CPT | Performed by: ADVANCED PRACTICE MIDWIFE

## 2018-06-21 NOTE — LETTER
2018       RE: Vianca Valle  [address]  [place] [address]     Dear Colleague,    Thank you for referring your patient, Vianca Valle, to the [facility] at [facility]. Please see a copy of my visit note below.    SUBJECTIVE:   Vianca is a 33 year old female who presents to clinic for a new OB visit.     at 10w4d with Estimated Date of Delivery: 2019   based on LMP, c/w dating ultrasound. Feels well. Has started PNV.     She has not had bleeding since her LMP.   She has had mild nausea- mostly afternoon and night. Improving. No emesis.  Weight loss has not occurred.   This was a planned pregnancy.   FOB is involved,  - Bartolo       ===========================================  ROS:    ROS: 10 point ROS neg other than the symptoms noted above in the HPI.      PSYCHIATRIC:  Denies mood changes, difficulty concentrating, depression, anxiety, panic attacks or post partum depression  PHQ-9: 4, GAD7= 2    Past History:  Her past medical history   Past Medical History:   Diagnosis Date     Allergic rhinitis      Hematuria 2017     Ruptured ear drum, right 2016   .   This is her first pregnancy  Since her last LMP she denies use of alcohol, tobacco and street drugs.  HISTORY:  Family History   Problem Relation Age of Onset     Thyroid Disease Maternal Grandmother      C.A.D. Maternal Grandfather      Diabetes Paternal Grandmother      Social History     Social History     Marital status: Single     Spouse name: Bartolo     Number of children: N/A     Years of education: college     Occupational History           Lab- molecular, FT     Social History Main Topics     Smoking status: Former Smoker     Packs/day: 0.50     Years: 10.00     Types: Cigarettes     Start date: 2006     Smokeless tobacco: Never Used      Comment: 2018     Alcohol use No     Drug use: No     Sexual activity: Yes     Partners: Male      Birth control/ protection: None      Comment: monogamous relationship since May 2013     Other Topics Concern     None     Social History Narrative    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     How much exercise per week? Not much daily    How much calcium per day? 2-3 servings       How much caffeine per day? 1 cup coffee    How much vitamin D per day?   occ supplements    Do you/your family wear seatbelts?  Yes    Do you/your family use safety helmets? N/A    Do you/your family use sunscreen? Yes    Do you/your family keep firearms in the home? No    Do you/your family have a smoke detector(s)? Yes        Do you feel safe in your home? Yes    Has anyone ever touched you in an unwanted manner? No     Explain         2014 Ravinder Kuhn LPN    Reviewed cmckim lpn 2017              Current Outpatient Prescriptions   Medication Sig     HERBALS      loratadine (CLARITIN) 10 MG tablet Take 10 mg by mouth daily Reported on 2017     Omega-3 Fatty Acids (FISH OIL PO) Take by mouth daily     Prenatal Vit-Fe Fumarate-FA (PRENATAL PO) Take by mouth daily     probiotic CAPS      Pseudoephedrine HCl (SUDAFED PO) Reported on 2017     VITAMIN D, CHOLECALCIFEROL, PO Take by mouth daily     No current facility-administered medications for this visit.      No Known Allergies    ============================================  MEDICAL HISTORY  Past Medical History:   Diagnosis Date     Allergic rhinitis      Hematuria 2017     Ruptured ear drum, right 2016     Past Surgical History:   Procedure Laterality Date     TONSILLECTOMY      2002       Obstetric History       T0      L0     SAB0   TAB0   Ectopic0   Multiple0   Live Births0       # Outcome Date GA Lbr Jeronimo/2nd Weight Sex Delivery Anes PTL Lv   1 Current                 GYN History- Last pap NIL, neg HPV 2017    Abnormal Pap Smears: no                        Cervical procedures: no                   "      History of STI: no    I personally reviewed the past social/family/medical and surgical history on the date of service.   I reviewed lab work done at Intake visit with patient.    EXAM:  /78  Pulse 80  Ht 1.6 m (5' 2.99\")  Wt 78.2 kg (172 lb 4.8 oz)  LMP 2018  BMI 30.53 kg/m2   EXAM:  GENERAL:  Pleasant pregnant female, alert, cooperative and well groomed.  SKIN:  Warm and dry, without lesions or rashes  HEAD: Symmetrical features.  MOUTH:  Buccal mucosa pink, moist without lesions.  Teeth in good repair.    NECK:  Thyroid without enlargement and nodules.  Lymph nodes not palpable.   LUNGS:  Clear to auscultation.  BREAST:    No dominant, fixed or suspicious masses are noted.  No skin or nipple changes or axillary nodes.   Nipples everted.      HEART:  RRR without murmur.  ABDOMEN: Soft without masses , tenderness or organomegaly.  No CVA tenderness.  Uterus palpable at size equal to dates.  No scars noted.. Fetal heart tones present at 165bpm via doppler.  MUSCULOSKELETAL:  Full range of motion  EXTREMITIES:  No edema. No significant varicosities.   PELVIC EXAM: declines  GC/CHLAMYDIA CULTURE OBTAINED:YES  Pap NIL, negative HPV 2017    ASSESSMENT:  33 year old , 10w4d weeks of pregnancy with ELICEO of 2019 by LMP  Intrauterine pregnancy 10w4d size consistent with dates  Genetic Screening: First Trimester Screen, level 2      ICD-10-CM    1. Encounter for supervision of normal first pregnancy in first trimester Z34.01 [facility] Referral [9046.022]     UA with Microscopic     Chlamydia PCR (Clinic Collect)     Gonorrhea PCR   2. Encounter for nuchal translucency testing Z36.82 [facility] Referral [9046.022]   3. Encounter for fetal anatomic survey Z36.89 [facility] Referral [9046.022]       PLAN:  - Reviewed use of triage nurse line and contacting the on-call provider after hours for an urgent need such as fever, " vagina bleeding, bladder or vaginal infection, rupture of membranes,  or term labor.    - Reviewed best evidence for: weight gain for her weight and height for pregnancy:  Based on pre-pregnancy weight and Body mass index is 30.53 kg/(m^2).   - Reviewed healthy diet and foods to avoid; exercise and activity during pregnancy; avoiding exposure to toxoplasmosis; and maintenance of a generally healthy lifestyle.   - Discussed the harms, benefits, side effects and alternative therapies for current prescribed and OTC medications.    - All pt's questions discussed and answered.  Pt verbalized understanding of and agreement to plan of care.     - Reviewed OBI labs.   - Recommended 8392-2135 IU/day vitamin d supplementation.  - GC/CT collected.  - Pap with hpv cotesting up to date 2017.  - Desires 1st trimester screening and level 2 u/s, [facility] referral placed.    - Continue scheduled prenatal care, RTC in 4 weeks and prn if questions or concerns    AMANDA Landa, MELITA

## 2018-06-21 NOTE — MR AVS SNAPSHOT
"              After Visit Summary   6/21/2018    Vianca Valle    MRN: [id number]           Patient Information     Date Of Birth          1985        Visit Information        Provider Department      6/21/2018 3:30 PM Marialuisa Moura CNM [facility]        Today's Diagnoses     Encounter for supervision of normal first pregnancy in first trimester    -  1    Encounter for nuchal translucency testing        Encounter for fetal anatomic survey          Care Instructions      Thank you for choosing [facility] for your obstetrical care.  We are an integrated health clinic with obstetricians, midwives, a psychologist, an acupuncturist, a nutritionist, a pharmacist, internal medicine and family practice all in one.  If you have questions about services offered please ask.      o Please keep all appointments with your provider.  You will help catch, prevent and treat problems early.  o Review your Expectant Family booklet and folder given to you at this intake visit.  It can answer many basic questions including:    Treatment for nausea and vomiting    Medications that are safe in pregnancy    Healthy diet and weight gain    Exercise and activity  o ASK questions!!  Please use \"Questions for my New OB visit\" form to write down any questions or concerns for your next visit.  o Eat a healthy diet.  Visit www.choosemyplate.gov and click on  Pregnancy and Breastfeeding  for information and tips  o Do not smoke.  Avoid other people's smoke, too.  We are happy to help with referrals to stop smoking programs.  o Do not drink alcohol.  o Try to avoid people who have colds or other infections.  Practice good hand washing.  o Consider registering for our Healthy Pregnancy Class here at [facility].  This class is offered every 3rd Wednesday from 2:30-4:30 p.m.  [place] at [phone] or online at [email or web address] or " [email or web address]  o Consider registering for prenatal education classes through [facility].  You can view class schedules and register online at [email or web address] or call [phone] for questions     For urgent concerns, call [facility] at [phone] to speak with a triage nurse during regular clinic hours (8:00 am - 4:30 pm).  This line is answered by our service 24 hours a day, after hours a provider will return your call.          Follow-ups after your visit        Additional Services     Maternal Fetal Medicine Center Referral [9046.022]       Body mass index is 30.53 kg/(m^2).    >> Patient may proceed with recommendations for further testing as directed by the Maternal Fetal Medicine Specialist >>    >> If requesting Fetal Echo: MFM will determine appropriate location for exam due to indication.    >> If requesting Lung Maturity Amnio:  If results indicate fetal lung maturity, induction or C/S is recommended within 36 hours.  Please schedule accordingly.     Dear Patient:   Please be aware that coverage of these services is subject to the terms and limitations of your health insurance plan.  Call member services at your health plan with any benefit or coverage questions.      Please bring the following to your appointment:    >>  Any x-rays, CTs or MRIs which have been performed.  Contact the facility where they were done to arrange for  prior to your scheduled appointment.  Any new CT, MRI or other procedures ordered by your specialist must be performed at a [facility] facility or coordinated by your clinic's referral office.  >>  List of current medications   >>  This referral request   >>  Any documents/labs given to you for this referral                  Follow-up notes from your care team     Return in about 4 weeks (around 7/19/2018) for JOHN HUA.      Your next 10 appointments already scheduled     Jul 11, 2018  1:30 PM CDT   Genetic  Counseling with UR GEN COUNSELOR 2   [facility] [place] ([facility])    [address]  [facility] [address]   [phone]            Jul 11, 2018  2:15 PM CDT   MFCAROLYN NUCHAL TRANSLUCENCY with [id number]   [facility] [place] ([facility])    [address]  [facility] [address] [phone]            Jul 11, 2018  2:45 PM CDT   Radiology MD with RAHUL ROBERT MD   [facility] [place] ([facility])    [address]  [facility] [address]   [phone]           Please arrive at the time given for your first appointment. This visit is used internally to schedule the physician's time during your ultrasound.            Jul 19, 2018  8:15 AM CDT   RETURN OB with AMANDA Coreas CNM   [facility] ([facility])    [facility] [address]  [address]  [facility] [address]   [phone]            Aug 13, 2018  8:45 AM CDT   MFCAROLYN US COMP with [id number]   [facility] [place] ([facility])    [address]  [facility] [address] [phone]           Wear comfortable clothes and leave your valuables at home.            Aug 13, 2018  9:15 AM CDT   Radiology MD with RAHUL ROBERT MD   [facility] [place] ([facility])    [address]  [facility] [address]   [phone]           Please arrive at the time given for your first appointment. This visit is used internally to schedule the physician's time during your ultrasound.              Who to contact     Please call your clinic at [phone] to:    Ask questions about your health    Make or cancel appointments    Discuss your medicines    Learn about your test  "results    Speak to your doctor            Additional Information About Your Visit        [employer] Information     [employer] gives you secure access to your electronic health record. If you see a primary care provider, you can also send messages to your care team and make appointments. If you have questions, please call your primary care clinic.  If you do not have a primary care provider, please call [phone] and they will assist you.      [employer] is an electronic gateway that provides easy, online access to your medical records. With [employer], you can request a clinic appointment, read your test results, renew a prescription or communicate with your care team.     To access your existing account, please contact your [facility] or call [phone] for assistance.        Care EveryWhere ID     This is your Care EveryWhere ID. This could be used by other organizations to access your [place] medical records  [id number]        Your Vitals Were     Pulse Height Last Period BMI (Body Mass Index)          80 1.6 m (5' 2.99\") 04/08/2018 (Exact Date) 30.53 kg/m2         Blood Pressure from Last 3 Encounters:   06/21/18 115/78   06/05/18 123/84   02/26/18 120/80    Weight from Last 3 Encounters:   06/21/18 78.2 kg (172 lb 4.8 oz)   06/05/18 78 kg (172 lb)   02/26/18 76.7 kg (169 lb)              We Performed the Following     Chlamydia PCR (Clinic Collect)     Gonorrhea PCR     Maternal Fetal Medicine Center Referral [9046.022]        Primary Care Provider    None Specified       No primary provider on file.        Equal Access to Services     PARVIZ SAVAGE : Hadii filomena Saleh, waaxda juaquinadaha, qaybta kaalmazainab dan . So [facility] [phone].    ATENCIÓN: Si habla español, tiene a car disposición servicios gratuitos de asistencia lingüística. Llame al [phone].    We comply with applicable federal civil rights laws and [place] laws. " We do not discriminate on the basis of race, color, national origin, age, disability, sex, sexual orientation, or gender identity.            Thank you!     Thank you for choosing [facility]  for your care. Our goal is always to provide you with excellent care. Hearing back from our patients is one way we can continue to improve our services. Please take a few minutes to complete the written survey that you may receive in the mail after your visit with us. Thank you!             Your Updated Medication List - Protect others around you: Learn how to safely use, store and throw away your medicines at www.disposemymeds.org.          This list is accurate as of 6/21/18 11:59 PM.  Always use your most recent med list.                   Brand Name Dispense Instructions for use Diagnosis    FISH OIL PO      Take by mouth daily        HERBALS           loratadine 10 MG tablet    CLARITIN     Take 10 mg by mouth daily Reported on 4/19/2017        PRENATAL PO      Take by mouth daily        probiotic Caps           SUDAFED PO      Reported on 4/19/2017        VITAMIN D (CHOLECALCIFEROL) PO      Take by mouth daily

## 2018-06-21 NOTE — PROGRESS NOTES
SUBJECTIVE:   Vianca is a 33 year old female who presents to clinic for a new OB visit.     at 10w4d with Estimated Date of Delivery: 2019   based on LMP, c/w dating ultrasound. Feels well. Has started PNV.     She has not had bleeding since her LMP.   She has had mild nausea- mostly afternoon and night. Improving. No emesis.  Weight loss has not occurred.   This was a planned pregnancy.   FOB is involved,  - Bartolo       ===========================================  ROS:    ROS: 10 point ROS neg other than the symptoms noted above in the HPI.      PSYCHIATRIC:  Denies mood changes, difficulty concentrating, depression, anxiety, panic attacks or post partum depression  PHQ-9: 4, GAD7= 2    Past History:  Her past medical history   Past Medical History:   Diagnosis Date     Allergic rhinitis      Hematuria 2017     Ruptured ear drum, right 2016   .   This is her first pregnancy  Since her last LMP she denies use of alcohol, tobacco and street drugs.  HISTORY:  Family History   Problem Relation Age of Onset     Thyroid Disease Maternal Grandmother      [email or web address]. Maternal Grandfather      Diabetes Paternal Grandmother      Social History     Social History     Marital status: Single     Spouse name: Bartolo     Number of children: N/A     Years of education: college     Occupational History           Lab- molecular, FT     Social History Main Topics     Smoking status: Former Smoker     Packs/day: 0.50     Years: 10.00     Types: Cigarettes     Start date: 2006     Smokeless tobacco: Never Used      Comment: 2018     Alcohol use No     Drug use: No     Sexual activity: Yes     Partners: Male     Birth control/ protection: None      Comment: monogamous relationship since May 2013     Other Topics Concern     None     Social History Narrative    Lionel  in research at the  of . Recently moved here from [place].     How much exercise per week? Not much daily    How much calcium per  "day? 2-3 servings       How much caffeine per day? 1 cup coffee    How much vitamin D per day?   occ supplements    Do you/your family wear seatbelts?  Yes    Do you/your family use safety helmets? N/A    Do you/your family use sunscreen? Yes    Do you/your family keep firearms in the home? No    Do you/your family have a smoke detector(s)? Yes        Do you feel safe in your home? Yes    Has anyone ever touched you in an unwanted manner? No     Explain         2014 Ravinder Kuhn LPN    Reviewed cmckim lpn 2017              Current Outpatient Prescriptions   Medication Sig     HERBALS      loratadine (CLARITIN) 10 MG tablet Take 10 mg by mouth daily Reported on 2017     Omega-3 Fatty Acids (FISH OIL PO) Take by mouth daily     Prenatal Vit-Fe Fumarate-FA (PRENATAL PO) Take by mouth daily     probiotic CAPS      Pseudoephedrine HCl (SUDAFED PO) Reported on 2017     VITAMIN D, CHOLECALCIFEROL, PO Take by mouth daily     No current facility-administered medications for this visit.      No Known Allergies    ============================================  MEDICAL HISTORY  Past Medical History:   Diagnosis Date     Allergic rhinitis      Hematuria 2017     Ruptured ear drum, right 2016     Past Surgical History:   Procedure Laterality Date     TONSILLECTOMY      2002       Obstetric History       T0      L0     SAB0   TAB0   Ectopic0   Multiple0   Live Births0       # Outcome Date GA Lbr Jeronimo/2nd Weight Sex Delivery Anes PTL Lv   1 Current                 GYN History- Last pap NIL, neg HPV 2017    Abnormal Pap Smears: no                        Cervical procedures: no                        History of STI: no    I personally reviewed the past social/family/medical and surgical history on the date of service.   I reviewed lab work done at Intake visit with patient.    EXAM:  /78  Pulse 80  Ht 1.6 m (5' 2.99\")  Wt 78.2 kg (172 lb 4.8 oz)  LMP 2018  BMI 30.53 kg/m2 "   EXAM:  GENERAL:  Pleasant pregnant female, alert, cooperative and well groomed.  SKIN:  Warm and dry, without lesions or rashes  HEAD: Symmetrical features.  MOUTH:  Buccal mucosa pink, moist without lesions.  Teeth in good repair.    NECK:  Thyroid without enlargement and nodules.  Lymph nodes not palpable.   LUNGS:  Clear to auscultation.  BREAST:    No dominant, fixed or suspicious masses are noted.  No skin or nipple changes or axillary nodes.   Nipples everted.      HEART:  RRR without murmur.  ABDOMEN: Soft without masses , tenderness or organomegaly.  No CVA tenderness.  Uterus palpable at size equal to dates.  No scars noted.. Fetal heart tones present at 165bpm via doppler.  MUSCULOSKELETAL:  Full range of motion  EXTREMITIES:  No edema. No significant varicosities.   PELVIC EXAM: declines  GC/CHLAMYDIA CULTURE OBTAINED:YES  Pap NIL, negative HPV 2017    ASSESSMENT:  33 year old , 10w4d weeks of pregnancy with ELICEO of 2019 by LMP  Intrauterine pregnancy 10w4d size consistent with dates  Genetic Screening: First Trimester Screen, level 2      ICD-10-CM    1. Encounter for supervision of normal first pregnancy in first trimester Z34.01 [facility] Medicine Center Referral [9046.022]     UA with Microscopic     Chlamydia PCR (Clinic Collect)     Gonorrhea PCR   2. Encounter for nuchal translucency testing Z36.82 [facility] Referral [9046.022]   3. Encounter for fetal anatomic survey Z36.89 [facility] Referral [9046.022]       PLAN:  - Reviewed use of triage nurse line and contacting the on-call provider after hours for an urgent need such as fever, vagina bleeding, bladder or vaginal infection, rupture of membranes,  or term labor.    - Reviewed best evidence for: weight gain for her weight and height for pregnancy:  Based on pre-pregnancy weight and Body mass index is 30.53 kg/(m^2).   - Reviewed healthy diet and foods to avoid; exercise  and activity during pregnancy; avoiding exposure to toxoplasmosis; and maintenance of a generally healthy lifestyle.   - Discussed the harms, benefits, side effects and alternative therapies for current prescribed and OTC medications.    - All pt's questions discussed and answered.  Pt verbalized understanding of and agreement to plan of care.     - Reviewed OBI labs.   - Recommended 7003-4737 IU/day vitamin d supplementation.  - GC/CT collected.  - Pap with hpv cotesting up to date 1/4/2017.  - Desires 1st trimester screening and level 2 u/s, [facility] referral placed.    - Continue scheduled prenatal care, RTC in 4 weeks and prn if questions or concerns    Marialuisa Moura, AMANDA, CNM

## 2018-06-21 NOTE — PATIENT INSTRUCTIONS
"  Thank you for choosing [facility] for your obstetrical care.  We are an integrated health clinic with obstetricians, midwives, a psychologist, an acupuncturist, a nutritionist, a pharmacist, internal medicine and family practice all in one.  If you have questions about services offered please ask.      o Please keep all appointments with your provider.  You will help catch, prevent and treat problems early.  o Review your Expectant Family booklet and folder given to you at this intake visit.  It can answer many basic questions including:    Treatment for nausea and vomiting    Medications that are safe in pregnancy    Healthy diet and weight gain    Exercise and activity  o ASK questions!!  Please use \"Questions for my New OB visit\" form to write down any questions or concerns for your next visit.  o Eat a healthy diet.  Visit www.choosemyplate.gov and click on  Pregnancy and Breastfeeding  for information and tips  o Do not smoke.  Avoid other people's smoke, too.  We are happy to help with referrals to stop smoking programs.  o Do not drink alcohol.  o Try to avoid people who have colds or other infections.  Practice good hand washing.  o Consider registering for our Healthy Pregnancy Class here at [facility].  This class is offered every 3rd Wednesday from 2:30-4:30 p.m.  [place] at [phone] or online at [email or web address]  o Consider registering for prenatal education classes through [place] [facility].  You can view class schedules and register online at [email or web address] or call [phone] for questions     For urgent concerns, call [facility] at [phone] to speak with a triage nurse during regular clinic hours (8:00 am - 4:30 pm).  This line is answered by our service 24 hours a day, after hours a provider will return your call.  "

## 2018-06-22 LAB
C TRACH DNA SPEC QL NAA+PROBE: NEGATIVE
N GONORRHOEA DNA SPEC QL NAA+PROBE: NEGATIVE
SPECIMEN SOURCE: NORMAL
SPECIMEN SOURCE: NORMAL

## 2018-07-10 ENCOUNTER — PRE VISIT (OUTPATIENT)
Dept: MATERNAL FETAL MEDICINE | Facility: CLINIC | Age: 33
End: 2018-07-10

## 2018-07-11 ENCOUNTER — OFFICE VISIT (OUTPATIENT)
Dept: MATERNAL FETAL MEDICINE | Facility: CLINIC | Age: 33
End: 2018-07-11
Attending: ADVANCED PRACTICE MIDWIFE
Payer: COMMERCIAL

## 2018-07-11 ENCOUNTER — HOSPITAL ENCOUNTER (OUTPATIENT)
Dept: ULTRASOUND IMAGING | Facility: CLINIC | Age: 33
Discharge: HOME | End: 2018-07-11
Attending: ADVANCED PRACTICE MIDWIFE | Admitting: SOCIAL WORKER
Payer: COMMERCIAL

## 2018-07-11 DIAGNOSIS — Z36.82 ENCOUNTER FOR ANTENATAL SCREENING FOR NUCHAL TRANSLUCENCY: Primary | ICD-10-CM

## 2018-07-11 DIAGNOSIS — O26.90 PREGNANCY RELATED CONDITION, ANTEPARTUM: ICD-10-CM

## 2018-07-11 DIAGNOSIS — Z36.9 FIRST TRIMESTER SCREENING: Primary | ICD-10-CM

## 2018-07-11 PROCEDURE — 76813 OB US NUCHAL MEAS 1 GEST: CPT

## 2018-07-11 PROCEDURE — 96040 ZZH GENETIC COUNSELING, EACH 30 MINUTES: CPT | Mod: ZF | Performed by: GENETIC COUNSELOR, MS

## 2018-07-11 NOTE — PROGRESS NOTES
[facility]  Genetic Counseling Consult    Patient: Vianca Valle YOB: 1985   Date of Service: 18      Vianca Valle was seen at [facility] for genetic consultation to discuss the options for routine screening for fetal chromosome abnormalities. She was accompanied to today's appointment by her partner, Bartolo.      Impression/Plan:   1.  Vianca had an ultrasound and blood draw for first trimester screening. An accurate nuchal translucency measurement was unable to be obtained today due to fetal positioning. Vianca is scheduled to come back to [facility] tomorrow for a repeat attempt. She did proceed with biochemical blood draw today. Results are expected within 5-7 days, and will be available in ISBX.  We will contact her to discuss the results, and a copy will be forwarded to the office of the referring OB provider.    2. Maternal serum AFP (single marker screen) is recommended after 15 weeks to screen for open neural tube defects. A quad screen should not be performed.    Pregnancy History:   /Parity:    Age at Delivery: 33 year old  ELICEO: 2019, by Last Menstrual Period  Gestational Age: 13w3d    No significant complications or exposures were reported in the current pregnancy.    Medical History:   Vianca s reported medical history is not expected to impact pregnancy management or risks to fetal development.       Family History:   A three-generation pedigree was obtained, and is scanned under the  Media  tab.   The following significant findings were reported by Vianca:  Vianca has a maternal cousin with autism. Some forms of autism spectrum disorders can be associated with specific genetic conditions, but most often is due to the combination of genes and environmental factors that can affect development.  Because there is a genetic component to autism spectrum disorders, the recurrence risk  for other family members is higher among first-degree relatives of the individual with an autism spectrum disorder (full siblings), and decreases with distance in relationship to other second-degree relatives.    Otherwise, the reported family history is negative for multiple miscarriages, stillbirths, birth defects, mental retardation, known genetic conditions, and consanguinity.       Carrier Screening:   The patient reports that she and the father of the pregnancy have  ancestry:      Cystic fibrosis is an autosomal recessive genetic condition that occurs with increased frequency in individuals of  ancestry and carrier screening for this condition is available.  In addition,  screening in the [facility] includes cystic fibrosis.      Expanded carrier screening for mutations in a large panel of genes associated with autosomal recessive conditions including cystic fibrosis, spinal muscular atrophy, and others, is now available.      The patient was not certain about whether to pursue carrier screening today.  She was provided with a brochure about her testing options, and contact information if she has questions or wishes to pursue screening.       Risk Assessment for Chromosome Conditions:   We explained that the risk for fetal chromosome abnormalities increases with maternal age. We discussed specific features of common chromosome abnormalities, including Down syndrome, trisomy 13, trisomy 18, and sex chromosome trisomies.      - At age 33 at midtrimester, the risk to have a baby with Down syndrome is 1 in 342.     - At age 33 at midtrimester, the risk to have a baby with any chromosome abnormality is 1 in  172.        Testing Options:   We discussed the following options:   First trimester screening    First trimester ultrasound with nuchal translucency and nasal bone assessments, maternal plasma hCG, QUANG-A, and AFP measurement    Screens for fetal trisomy 21, trisomy 13, and  trisomy 18    Cannot screen for open neural tube defects; maternal serum AFP after 15 weeks is recommended     Non-invasive Prenatal Testing (NIPT)    Maternal plasma cell-free DNA testing; first trimester ultrasound with nuchal translucency and nasal bone assessment is recommended, when appropriate    Screens for fetal trisomy 21, trisomy 13, trisomy 18, and sex chromosome aneuploidy    Cannot screen for open neural tube defects; maternal serum AFP after 15 weeks is recommended     Comprehensive (Level II) ultrasound: Detailed ultrasound performed between 18-22 weeks gestation to screen for major birth defects and markers for aneuploidy.    We reviewed the benefits and limitations of this testing.  Screening tests provide a risk assessment specific to the pregnancy for certain fetal chromosome abnormalities, but cannot definitively diagnose or exclude a fetal chromosome abnormality.  Follow-up genetic counseling and consideration of diagnostic testing is recommended with any abnormal screening result.      It was a pleasure to be involved with Vianca wang care. Face-to-face time of the meeting was 25 minutes.    Valeria Stephenson MS, [facility]  Maternal Fetal Medicine  [facility]  Ph: [phone]  [email or web address]

## 2018-07-11 NOTE — PROGRESS NOTES
Please see ultrasound report under imaging tab for details on ultrasound performed today.    Che Glez MD  , OB/GYN  Maternal-Fetal Medicine  [email or web address]  [phone] (Academic office)  [phone] (Pager)

## 2018-07-11 NOTE — MR AVS SNAPSHOT
After Visit Summary   7/11/2018    Vianca Valle    MRN: [id number]           Patient Information     Date Of Birth          1985        Visit Information        Provider Department      7/11/2018 1:30 PM Anu Stephenson [facility] [place]        Today's Diagnoses     First trimester screening    -  1    Pregnancy related condition, antepartum           Follow-ups after your visit        Your next 10 appointments already scheduled     Jul 12, 2018  3:00 PM CDT   MFM NUCHAL TRANSLUCENCY with [id number]   [facility])    [address]  [facility] [address] [phone]            Jul 12, 2018  3:30 PM CDT   Radiology MD with RAHUL ROBERT MD   [facility])    [address]  [facility] [place] [address]   [phone]           Please arrive at the time given for your first appointment. This visit is used internally to schedule the physician's time during your ultrasound.            Jul 19, 2018  8:15 AM CDT   RETURN OB with AMANDA Coreas CNM   [facility] ([facility])    [place] [facility] [address]  [address]  [facility] [address] [phone]            Aug 13, 2018  8:45 AM CDT   MFM US COMP with URMFMUSR1   [facility])    [address]  [facility] [address] [phone]           Wear comfortable clothes and leave your valuables at home.            Aug 13, 2018  9:15 AM CDT   Radiology MD with RAHUL ROBERT MD   [facility] [place] ([facility])    [address]  [facility] [place] [address]   [phone]           Please arrive at the time given for your first  appointment. This visit is used internally to schedule the physician's time during your ultrasound.              Future tests that were ordered for you today     Open Future Orders        Priority Expected Expires Ordered    Maternal Fetal Nuchal Translucency Routine  5/11/2019 7/11/2018    Maternal Fetal Nuchal Translucency Routine  5/11/2019 7/11/2018            Who to contact     If you have questions or need follow up information about today's clinic visit or your schedule please contact [facility] MATERNAL FETAL MEDICINE [facility] directly at [phone].  Normal or non-critical lab and imaging results will be communicated to you by Hearsay.ithart, letter or phone within 4 business days after the clinic has received the results. If you do not hear from us within 7 days, please contact the clinic through MoneyDesktop or phone. If you have a critical or abnormal lab result, we will notify you by phone as soon as possible.  Submit refill requests through MoneyDesktop or call your pharmacy and they will forward the refill request to us. Please allow 3 business days for your refill to be completed.          Additional Information About Your Visit        Hearsay.ithart Information     MoneyDesktop gives you secure access to your electronic health record. If you see a primary care provider, you can also send messages to your care team and make appointments. If you have questions, please call your primary care clinic.  If you do not have a primary care provider, please call [phone] and they will assist you.        Care EveryWhere ID     This is your Care EveryWhere ID. This could be used by other organizations to access your [place] medical records  [id number]        Your Vitals Were     Last Period                   04/08/2018 (Exact Date)            Blood Pressure from Last 3 Encounters:   06/21/18 115/78   06/05/18 123/84   02/26/18 120/80    Weight from Last 3 Encounters:   06/21/18 78.2 kg (172 lb 4.8 oz)   06/05/18 78 kg (172 lb)    02/26/18 76.7 kg (169 lb)              We Performed the Following     MFM Genetic Counseling        Primary Care Provider    None Specified       No primary provider on file.        Equal Access to Services     SHELLEY SAVAGE : Shira Saleh, connie brar, maninderkalen benitezdanyelfernanda worleydmitriyfernanda, waxamanda tanyain hayaafabian worleymoses mariano laKellenashwini billy. So [facility] [phone].    ATENCIÓN: Si habla español, tiene a car disposición servicios gratuitos de asistencia lingüística. Llame al [phone].    We comply with applicable federal civil rights laws and [place] laws. We do not discriminate on the basis of race, color, national origin, age, disability, sex, sexual orientation, or gender identity.            Thank you!     Thank you for choosing [facility]  for your care. Our goal is always to provide you with excellent care. Hearing back from our patients is one way we can continue to improve our services. Please take a few minutes to complete the written survey that you may receive in the mail after your visit with us. Thank you!             Your Updated Medication List - Protect others around you: Learn how to safely use, store and throw away your medicines at www.disposemymeds.org.          This list is accurate as of 7/11/18  3:45 PM.  Always use your most recent med list.                   Brand Name Dispense Instructions for use Diagnosis    FISH OIL PO      Take by mouth daily        HERBALS           loratadine 10 MG tablet    CLARITIN     Take 10 mg by mouth daily Reported on 4/19/2017        PRENATAL PO      Take by mouth daily        probiotic Caps           SUDAFED PO      Reported on 4/19/2017        VITAMIN D (CHOLECALCIFEROL) PO      Take by mouth daily

## 2018-07-11 NOTE — MR AVS SNAPSHOT
After Visit Summary   2018    Vianca Valle    MRN: [id number]           Patient Information     Date Of Birth          1985        Visit Information        Provider Department      2018 2:45 PM Che Glez MD [facility] Maternal Fetal Medicine [facility]        Today's Diagnoses     Encounter for  screening for nuchal translucency    -  1       Follow-ups after your visit        Your next 10 appointments already scheduled     2018  3:00 PM CDT   LAB with OP LAB UR   [facility] Laboratory Services ([facility])    [address]  [facility] [address]   [phone]           Please do not eat 10-12 hours before your appointment if you are coming in fasting for labs on lipids, cholesterol, or glucose (sugar). This does not apply to pregnant women. Water, hot tea and black coffee (with nothing added) are okay. Do not drink other fluids, diet soda or chew gum.            2018  8:15 AM CDT   RETURN OB with AMANDA Coreas CNM   [facility] ([facility])    [place] [facility] [address] [address]  [address]  [address]  [facility] [address]   [phone]            Aug 13, 2018  8:45 AM CDT   MFCAROLYN US COMP with URMFMUSR1   [facility]etal [facility] Ultrasound - [place] ([facility])    [address]  [facility] [id number] [phone]           Wear comfortable clothes and leave your valuables at home.            Aug 13, 2018  9:15 AM CDT   Radiology MD with UR JAKOB LUGO   [facility] - [place] ([facility])    [address]  [facility] [address]   [phone]           Please arrive at the time given for your first appointment. This visit is used internally to schedule the physician's time during your ultrasound.              Future tests that were  ordered for you today     Open Future Orders        Priority Expected Expires Ordered    Maternal Fetal Nuchal Translucency Routine  5/11/2019 7/11/2018    Maternal Fetal Nuchal Translucency Routine  5/11/2019 7/11/2018            Who to contact     If you have questions or need follow up information about today's clinic visit or your schedule please contact [facility] MATERNAL FETAL MEDICINE [facility] directly at [phone].  Normal or non-critical lab and imaging results will be communicated to you by Evaneoshart, letter or phone within 4 business days after the clinic has received the results. If you do not hear from us within 7 days, please contact the clinic through BuyWithMe or phone. If you have a critical or abnormal lab result, we will notify you by phone as soon as possible.  Submit refill requests through BuyWithMe or call your pharmacy and they will forward the refill request to us. Please allow 3 business days for your refill to be completed.          Additional Information About Your Visit        BuyWithMe Information     BuyWithMe gives you secure access to your electronic health record. If you see a primary care provider, you can also send messages to your care team and make appointments. If you have questions, please call your primary care clinic.  If you do not have a primary care provider, please call [phone] and they will assist you.        Care EveryWhere ID     This is your Care EveryWhere ID. This could be used by other organizations to access your [place] medical records  [id number]        Your Vitals Were     Last Period                   04/08/2018 (Exact Date)            Blood Pressure from Last 3 Encounters:   06/21/18 115/78   06/05/18 123/84   02/26/18 120/80    Weight from Last 3 Encounters:   06/21/18 78.2 kg (172 lb 4.8 oz)   06/05/18 78 kg (172 lb)   02/26/18 76.7 kg (169 lb)               Primary Care Provider    None Specified       No primary provider on file.        Equal Access to  Services     [facility]: Hadii filomena Saleh, wasandrada luqadaha, qaybta kadanyelzainab dan. So [facility] [phone].    ATENCIÓN: Si habla español, tiene a car disposición servicios gratuitos de asistencia lingüística. Llame al [phone].    We comply with applicable federal civil rights laws and [place] laws. We do not discriminate on the basis of race, color, national origin, age, disability, sex, sexual orientation, or gender identity.            Thank you!     Thank you for choosing [facility]  for your care. Our goal is always to provide you with excellent care. Hearing back from our patients is one way we can continue to improve our services. Please take a few minutes to complete the written survey that you may receive in the mail after your visit with us. Thank you!             Your Updated Medication List - Protect others around you: Learn how to safely use, store and throw away your medicines at www.disposemymeds.org.          This list is accurate as of 7/11/18  2:54 PM.  Always use your most recent med list.                   Brand Name Dispense Instructions for use Diagnosis    FISH OIL PO      Take by mouth daily        HERBALS           loratadine 10 MG tablet    CLARITIN     Take 10 mg by mouth daily Reported on 4/19/2017        PRENATAL PO      Take by mouth daily        probiotic Caps           SUDAFED PO      Reported on 4/19/2017        VITAMIN D (CHOLECALCIFEROL) PO      Take by mouth daily

## 2018-07-12 ENCOUNTER — DOCUMENTATION ONLY (OUTPATIENT)
Dept: MATERNAL FETAL MEDICINE | Facility: CLINIC | Age: 33
End: 2018-07-12

## 2018-07-12 ENCOUNTER — HOSPITAL ENCOUNTER (OUTPATIENT)
Dept: ULTRASOUND IMAGING | Facility: CLINIC | Age: 33
Discharge: HOME | End: 2018-07-12
Attending: OBSTETRICS & GYNECOLOGY | Admitting: SOCIAL WORKER
Payer: COMMERCIAL

## 2018-07-12 ENCOUNTER — OFFICE VISIT (OUTPATIENT)
Dept: MATERNAL FETAL MEDICINE | Facility: CLINIC | Age: 33
End: 2018-07-12
Attending: OBSTETRICS & GYNECOLOGY
Payer: COMMERCIAL

## 2018-07-12 DIAGNOSIS — Z36.9 FIRST TRIMESTER SCREENING: ICD-10-CM

## 2018-07-12 DIAGNOSIS — Z36.82 ENCOUNTER FOR ANTENATAL SCREENING FOR NUCHAL TRANSLUCENCY: Primary | ICD-10-CM

## 2018-07-12 PROCEDURE — 76813 OB US NUCHAL MEAS 1 GEST: CPT

## 2018-07-12 NOTE — MR AVS SNAPSHOT
After Visit Summary   2018    Vianca Valle    MRN: [id number]           Patient Information     Date Of Birth          1985        Visit Information        Provider Department      2018 3:30 PM Che Glez MD [facility] Maternal Fetal Medicine [facility]        Today's Diagnoses     Encounter for  screening for nuchal translucency    -  1       Follow-ups after your visit        Your next 10 appointments already scheduled     2018  8:15 AM CDT   RETURN OB with AMANDA Coreas CNM   [facility]    [place] [facility] [address]  [address]  [address]  [facility] [address]   [phone]            Aug 13, 2018  8:45 AM CDT   MFM US COMP with [id number]   [facility] Maternal Fetal Medicine Ultrasound - [place] ([facility])    [address]  [facility] [address] [phone]           Wear comfortable clothes and leave your valuables at home.            Aug 13, 2018  9:15 AM CDT   Radiology MD with UR JAKOB LUGO   [facility] Maternal Fetal Medicine - [place] ([facility])    [address]  [facility] [address]   [phone]           Please arrive at the time given for your first appointment. This visit is used internally to schedule the physician's time during your ultrasound.              Future tests that were ordered for you today     Open Future Orders        Priority Expected Expires Ordered    Maternal Fetal Nuchal Translucency Routine  2019    Maternal Fetal Nuchal Translucency Routine  2019            Who to contact     If you have questions or need follow up information about today's clinic visit or your schedule please contact [facility] MATERNAL FETAL MEDICINE [facility] directly at [phone].  Normal or non-critical lab and imaging results will be communicated to you by  MyChart, letter or phone within 4 business days after the clinic has received the results. If you do not hear from us within 7 days, please contact the clinic through SQLstreamt or phone. If you have a critical or abnormal lab result, we will notify you by phone as soon as possible.  Submit refill requests through Tray or call your pharmacy and they will forward the refill request to us. Please allow 3 business days for your refill to be completed.          Additional Information About Your Visit        Harbor Wing Technologieshart Information     Tray gives you secure access to your electronic health record. If you see a primary care provider, you can also send messages to your care team and make appointments. If you have questions, please call your primary care clinic.  If you do not have a primary care provider, please call [phone] and they will assist you.        Care EveryWhere ID     This is your Care EveryWhere ID. This could be used by other organizations to access your [place] medical records  [id number]        Your Vitals Were     Last Period                   04/08/2018 (Exact Date)            Blood Pressure from Last 3 Encounters:   06/21/18 115/78   06/05/18 123/84   02/26/18 120/80    Weight from Last 3 Encounters:   06/21/18 78.2 kg (172 lb 4.8 oz)   06/05/18 78 kg (172 lb)   02/26/18 76.7 kg (169 lb)              Today, you had the following     No orders found for display       Primary Care Provider    None Specified       No primary provider on file.        Equal Access to Services     [facility]: Hadii filomena spears Soeulalio, waaxda lunevaadaha, qaybta kaalmada reg, zainab bains . So [facility] [phone].    ATENCIÓN: Si habla español, tiene a car disposición servicios gratuitos de asistencia lingüística. Llame al [phone].    We comply with applicable federal civil rights laws and [place] laws. We do not discriminate on the basis of race, color, national origin, age,  disability, sex, sexual orientation, or gender identity.            Thank you!     Thank you for choosing [facility]  for your care. Our goal is always to provide you with excellent care. Hearing back from our patients is one way we can continue to improve our services. Please take a few minutes to complete the written survey that you may receive in the mail after your visit with us. Thank you!             Your Updated Medication List - Protect others around you: Learn how to safely use, store and throw away your medicines at www.disposemymeds.org.          This list is accurate as of 7/12/18  3:52 PM.  Always use your most recent med list.                   Brand Name Dispense Instructions for use Diagnosis    FISH OIL PO      Take by mouth daily        HERBALS           loratadine 10 MG tablet    CLARITIN     Take 10 mg by mouth daily Reported on 4/19/2017        PRENATAL PO      Take by mouth daily        probiotic Caps           SUDAFED PO      Reported on 4/19/2017        VITAMIN D (CHOLECALCIFEROL) PO      Take by mouth daily

## 2018-07-12 NOTE — PROGRESS NOTES
Please see ultrasound report under imaging tab for details on ultrasound performed today.    Che Glez MD  , OB/GYN  Maternal-Fetal Medicine  [email or web address]  [phone] (Academic office)  [phone] (Pager)

## 2018-07-12 NOTE — PROGRESS NOTES
7/12/2018    Called NTD labs to update first trimester screen with ultrasound information from Vianca's NT scan today.      CRL: 74.6  NT: 1.9  Nasal bone: present    Vianca's blood work is still processing but her test has been updated with the information above and we will receive results once testing is complete.     Andi Whitney MS, [facility]  Licensed Genetic Counselor  Phone: [phone]  Pager: [phone]

## 2018-07-13 ENCOUNTER — TELEPHONE (OUTPATIENT)
Dept: MATERNAL FETAL MEDICINE | Facility: CLINIC | Age: 33
End: 2018-07-13

## 2018-07-13 NOTE — TELEPHONE ENCOUNTER
I left a message for Vianca today regarding her normal first trimester screen results. Specifically, the chance this pregnancy has Down syndrome was reduced from her age related risk of 1 in 321 to less than 1 in 10,000. Similarly, the chance this pregnancy has trisomy 18/trisomy 13 was reduced from her age related risk of 1 in 611 to less than 1 in 10,000. This result significantly reduces the chance this pregnancy has Down syndrome, trisomy 18, or trisomy 13.     These results are very reassuring, but there remains a small chance for a false positive or false negative result. This screen also does not address all chromosome abnormalities or all causes of birth defects and cognitive delay. As such, Vianca will still have the option of the Innatal screen and/or diagnostic testing (CVS or amniocentesis) if she is interested or there is an indication later in the pregnancy. She also has the option of having a maternal serum AFP blood draw after 15 weeks to screen for ONTD; she is encouraged to discuss this option with her primary OB provider. Vianca is also scheduled to return for a comprehensive ultrasound on 8/13.     I encouraged her to contact me if she has any questions in the future. A copy of this note and her first trimester screen results will be forwarded to her primary OB provider.    Valeria Stephenson MS, [facility]  Maternal Fetal Medicine  [facility]  Ph: [phone]  [email or web address]

## 2018-07-19 ENCOUNTER — OFFICE VISIT (OUTPATIENT)
Dept: OBGYN | Facility: CLINIC | Age: 33
End: 2018-07-19
Attending: ADVANCED PRACTICE MIDWIFE
Payer: COMMERCIAL

## 2018-07-19 VITALS
HEART RATE: 103 BPM | SYSTOLIC BLOOD PRESSURE: 134 MMHG | WEIGHT: 171 LBS | BODY MASS INDEX: 30.3 KG/M2 | DIASTOLIC BLOOD PRESSURE: 74 MMHG | HEIGHT: 63 IN

## 2018-07-19 DIAGNOSIS — Z34.02 ENCOUNTER FOR SUPERVISION OF NORMAL FIRST PREGNANCY IN SECOND TRIMESTER: Primary | ICD-10-CM

## 2018-07-19 PROCEDURE — G0463 HOSPITAL OUTPT CLINIC VISIT: HCPCS | Mod: ZF

## 2018-07-19 ASSESSMENT — PAIN SCALES - GENERAL: PAINLEVEL: NO PAIN (0)

## 2018-07-19 NOTE — MR AVS SNAPSHOT
After Visit Summary   7/19/2018    Vianca Valle    MRN: [id number]           Patient Information     Date Of Birth          1985        Visit Information        Provider Department      7/19/2018 8:15 AM Edvin Burton APRN CNM [facility]        Today's Diagnoses     Encounter for supervision of normal first pregnancy in second trimester    -  1       Follow-ups after your visit        Follow-up notes from your care team     Return in about 4 weeks (around 8/16/2018) for WADE Visit.      Your next 10 appointments already scheduled     Aug 13, 2018  8:45 AM CDT   JAKOB US COMP with [id number]   [facility]aternal [facility]etal Medicine Ultrasound - [facility])    [address]  [facility] [id number] [phone]           Wear comfortable clothes and leave your valuables at home.            Aug 13, 2018  9:15 AM CDT   Radiology MD with UR JAKOB LUGO   [facility] Maternal Fetal Medicine - [facility])    [address]  [facility] [id number] [phone]           Please arrive at the time given for your first appointment. This visit is used internally to schedule the physician's time during your ultrasound.              Future tests that were ordered for you today     Open Future Orders        Priority Expected Expires Ordered    AFP - Alpha Fetoprotein Maternal Screen Routine  7/19/2019 7/19/2018            Who to contact     Please call your clinic at [phone] to:    Ask questions about your health    Make or cancel appointments    Discuss your medicines    Learn about your test results    Speak to your doctor            Additional Information About Your Visit        MyChart Information     [employer] gives you secure access to your electronic health record. If you see a primary care provider, you can also send messages to your care team and make appointments. If you have  "questions, please call your primary care clinic.  If you do not have a primary care provider, please call [phone] and they will assist you.      [employer] is an electronic gateway that provides easy, online access to your medical records. With [employer], you can request a clinic appointment, read your test results, renew a prescription or communicate with your care team.     To access your existing account, please contact your [facility] or call [phone] for assistance.        Care EveryWhere ID     This is your Care EveryWhere ID. This could be used by other organizations to access your [place] medical records  [id number]        Your Vitals Were     Pulse Height Last Period BMI (Body Mass Index)          103 1.6 m (5' 2.99\") 04/08/2018 (Exact Date) 30.3 kg/m2         Blood Pressure from Last 3 Encounters:   07/19/18 134/74   06/21/18 115/78   06/05/18 123/84    Weight from Last 3 Encounters:   07/19/18 77.6 kg (171 lb)   06/21/18 78.2 kg (172 lb 4.8 oz)   06/05/18 78 kg (172 lb)                 Today's Medication Changes          These changes are accurate as of 7/19/18  8:36 AM.  If you have any questions, ask your nurse or doctor.               Stop taking these medicines if you haven't already. Please contact your care team if you have questions.     loratadine 10 MG tablet   Commonly known as:  CLARITIN   Stopped by:  Edvin Burton APRN CNM           SUDAFED PO   Stopped by:  Edvin Burton APRN CNM                    Primary Care Provider    None Specified       No primary provider on file.        Equal Access to Services     [facility]: Hadii filomena Saleh, waaxda luqadaha, qaybta kaalmazainab dan . So [facility] [phone].    ATENCIÓN: Si habla español, tiene a car disposición servicios gratuitos de asistencia lingüística. Llame al [phone].    We comply with applicable federal civil rights laws and [place] " laws. We do not discriminate on the basis of race, color, national origin, age, disability, sex, sexual orientation, or gender identity.            Thank you!     Thank you for choosing [facility]  for your care. Our goal is always to provide you with excellent care. Hearing back from our patients is one way we can continue to improve our services. Please take a few minutes to complete the written survey that you may receive in the mail after your visit with us. Thank you!             Your Updated Medication List - Protect others around you: Learn how to safely use, store and throw away your medicines at www.disposemymeds.org.          This list is accurate as of 7/19/18  8:36 AM.  Always use your most recent med list.                   Brand Name Dispense Instructions for use Diagnosis    FISH OIL PO      Take by mouth daily        HERBALS           PRENATAL PO      Take by mouth daily        probiotic Caps           VITAMIN D (CHOLECALCIFEROL) PO      Take by mouth daily

## 2018-07-19 NOTE — PROGRESS NOTES
"Subjective:     33 year old  at 14w4d presents for a routine prenatal appointment.      Denies vaginal bleeding or leakage of fluid.  Denies contractions or cramping. No fetal movement yet.       No HA, visual changes, RUQ or epigastric pain.   The patient presents with the following concerns: CBE classes, sleeping positions   Level II US Scheduled-    Offered AFP, accepted.     Objective:  Vitals:    18 0818   BP: 134/74   Pulse: 103   Weight: 77.6 kg (171 lb)   Height: 1.6 m (5' 2.99\")   See OB flowsheet    Assessment/Plan:   Encounter Diagnosis   Name Primary?     Encounter for supervision of normal first pregnancy in second trimester Yes     - Reviewed total weight gain, encouraged continued healthy diet and exercise.      - Reviewed why/how to contact provider.   - Patient education/orders or handouts today: PTL signs/symptoms, AFP only and Level 2 u/s scheduled   - AFP placed as future order  - Return to clinic in 4-6 weeks and prn if questions or concerns.   "

## 2018-07-19 NOTE — LETTER
"2018       RE: Vianca Valle  [id number]  [place] [address]     Dear Colleague,    Thank you for referring your patient, Vianca Valle, to the [facility] at [facility]. Please see a copy of my visit note below.    Subjective:     33 year old  at 14w4d presents for a routine prenatal appointment.      Denies vaginal bleeding or leakage of fluid.  Denies contractions or cramping. No fetal movement yet.       No HA, visual changes, RUQ or epigastric pain.   The patient presents with the following concerns: CBE classes, sleeping positions   Level II US Scheduled-    Offered AFP, accepted.     Objective:  Vitals:    18 0818   BP: 134/74   Pulse: 103   Weight: 77.6 kg (171 lb)   Height: 1.6 m (5' 2.99\")   See OB flowsheet    Assessment/Plan:   Encounter Diagnosis   Name Primary?     Encounter for supervision of normal first pregnancy in second trimester Yes     - Reviewed total weight gain, encouraged continued healthy diet and exercise.      - Reviewed why/how to contact provider.   - Patient education/orders or handouts today: PTL signs/symptoms, AFP only and Level 2 u/s scheduled   - AFP placed as future order  - Return to clinic in 4-6 weeks and prn if questions or concerns.     Again, thank you for allowing me to participate in the care of your patient.      Sincerely,    AMANDA Coon CNM      "

## 2018-07-19 NOTE — NURSING NOTE
Chief Complaint   Patient presents with     Prenatal Care   14.4 week  Feeling well has level 2 ultrasound scheduled.

## 2018-07-26 DIAGNOSIS — Z34.02 ENCOUNTER FOR SUPERVISION OF NORMAL FIRST PREGNANCY IN SECOND TRIMESTER: ICD-10-CM

## 2018-07-26 PROCEDURE — 36415 COLL VENOUS BLD VENIPUNCTURE: CPT | Performed by: ADVANCED PRACTICE MIDWIFE

## 2018-07-26 PROCEDURE — 82105 ALPHA-FETOPROTEIN SERUM: CPT | Performed by: ADVANCED PRACTICE MIDWIFE

## 2018-07-29 LAB
# FETUSES US: NORMAL
# FETUSES: 1
AFP ADJ MOM AMN: 1.25
AFP SERPL-MCNC: 35 NG/ML
AGE - REPORTED: 34.9 YR
CURRENT SMOKER: NO
CURRENT SMOKER: NO
DIABETES STATUS PATIENT: NO
FAMILY MEMBER DISEASES HX: NO
FAMILY MEMBER DISEASES HX: NO
GA METHOD: NORMAL
GA METHOD: NORMAL
GA: NORMAL WK
IDDM PATIENT QL: NO
INTEGRATED SCN PATIENT-IMP: NORMAL
LMP START DATE: NORMAL
MONOCHORIONIC TWINS: NO
SERVICE CMNT-IMP: NO
SPECIMEN DRAWN SERPL: NORMAL
VALPROIC/CARBAMAZEPINE STATUS: NO
WEIGHT UNITS: NORMAL

## 2018-08-13 ENCOUNTER — OFFICE VISIT (OUTPATIENT)
Dept: MATERNAL FETAL MEDICINE | Facility: CLINIC | Age: 33
End: 2018-08-13
Attending: ADVANCED PRACTICE MIDWIFE
Payer: COMMERCIAL

## 2018-08-13 ENCOUNTER — HOSPITAL ENCOUNTER (OUTPATIENT)
Dept: ULTRASOUND IMAGING | Facility: CLINIC | Age: 33
Discharge: HOME | End: 2018-08-13
Attending: ADVANCED PRACTICE MIDWIFE | Admitting: ADVANCED PRACTICE MIDWIFE
Payer: COMMERCIAL

## 2018-08-13 DIAGNOSIS — O26.90 PREGNANCY RELATED CONDITION, ANTEPARTUM: ICD-10-CM

## 2018-08-13 DIAGNOSIS — Z36.89 ENCOUNTER FOR FETAL ANATOMIC SURVEY: Primary | ICD-10-CM

## 2018-08-13 PROCEDURE — 76805 OB US >/= 14 WKS SNGL FETUS: CPT

## 2018-08-13 NOTE — PROGRESS NOTES
"Please see \"Imaging\" tab under \"Chart Review\" for details of today's US at the [facility].    Angelito Jackson MD  Maternal-Fetal Medicine      "

## 2018-08-13 NOTE — MR AVS SNAPSHOT
After Visit Summary   8/13/2018    Vianca Valle    MRN: [id number]           Patient Information     Date Of Birth          1985        Visit Information        Provider Department      8/13/2018 9:15 AM Angelito Jackson MD [facility] Maternal Fetal Medicine [facility]        Today's Diagnoses     Encounter for fetal anatomic survey    -  1       Follow-ups after your visit        Your next 10 appointments already scheduled     Aug 23, 2018  8:15 AM CDT   RETURN OB with Marialuisa Moura CNM   [facility] ([facility])    [facility] [address]  [address]  [facility] [id number] [phone]              Future tests that were ordered for you today     Open Future Orders        Priority Expected Expires Ordered    Maternal Fetal OB Complete 2/3 Tri Sngle Routine  4/22/2019 6/22/2018            Who to contact     If you have questions or need follow up information about today's clinic visit or your schedule please contact [facility] directly at [phone].  Normal or non-critical lab and imaging results will be communicated to you by Mowbly[employer], letter or phone within 4 business days after the clinic has received the results. If you do not hear from us within 7 days, please contact the clinic through [employer] or phone. If you have a critical or abnormal lab result, we will notify you by phone as soon as possible.  Submit refill requests through [employer] or call your pharmacy and they will forward the refill request to us. Please allow 3 business days for your refill to be completed.          Additional Information About Your Visit        Mowblyhart Information     [employer] gives you secure access to your electronic health record. If you see a primary care provider, you can also send messages to your care team and make appointments. If you have questions, please call your primary care  clinic.  If you do not have a primary care provider, please call [phone] and they will assist you.        Care EveryWhere ID     This is your Care EveryWhere ID. This could be used by other organizations to access your [place] medical records  [id number]        Your Vitals Were     Last Period                   04/08/2018 (Exact Date)            Blood Pressure from Last 3 Encounters:   07/19/18 134/74   06/21/18 115/78   06/05/18 123/84    Weight from Last 3 Encounters:   07/19/18 77.6 kg (171 lb)   06/21/18 78.2 kg (172 lb 4.8 oz)   06/05/18 78 kg (172 lb)              Today, you had the following     No orders found for display       Primary Care Provider    None Specified       No primary provider on file.        Equal Access to Services     [facility] : Shira Saleh, connie brar, isidro finney, zainab bains . So [facility] [phone].    ATENCIÓN: Si habla español, tiene a car disposición servicios gratuitos de asistencia lingüística. Llame al [phone].    We comply with applicable federal civil rights laws and [place] laws. We do not discriminate on the basis of race, color, national origin, age, disability, sex, sexual orientation, or gender identity.            Thank you!     Thank you for choosing [facility]  for your care. Our goal is always to provide you with excellent care. Hearing back from our patients is one way we can continue to improve our services. Please take a few minutes to complete the written survey that you may receive in the mail after your visit with us. Thank you!             Your Updated Medication List - Protect others around you: Learn how to safely use, store and throw away your medicines at www.disposemymeds.org.          This list is accurate as of 8/13/18  9:23 AM.  Always use your most recent med list.                   Brand Name Dispense Instructions for use Diagnosis    FISH  OIL PO      Take by mouth daily        HERBALS           PRENATAL PO      Take by mouth daily        probiotic Caps           VITAMIN D (CHOLECALCIFEROL) PO      Take by mouth daily

## 2018-08-23 ENCOUNTER — OFFICE VISIT (OUTPATIENT)
Dept: OBGYN | Facility: CLINIC | Age: 33
End: 2018-08-23
Attending: ADVANCED PRACTICE MIDWIFE
Payer: COMMERCIAL

## 2018-08-23 VITALS
HEIGHT: 63 IN | SYSTOLIC BLOOD PRESSURE: 115 MMHG | HEART RATE: 88 BPM | WEIGHT: 173 LBS | BODY MASS INDEX: 30.65 KG/M2 | DIASTOLIC BLOOD PRESSURE: 78 MMHG

## 2018-08-23 DIAGNOSIS — Z34.02 ENCOUNTER FOR SUPERVISION OF NORMAL FIRST PREGNANCY IN SECOND TRIMESTER: Primary | ICD-10-CM

## 2018-08-23 PROCEDURE — G0463 HOSPITAL OUTPT CLINIC VISIT: HCPCS | Mod: ZF

## 2018-08-23 ASSESSMENT — PAIN SCALES - GENERAL: PAINLEVEL: NO PAIN (0)

## 2018-08-23 NOTE — MR AVS SNAPSHOT
After Visit Summary   8/23/2018    Vianca Valle    MRN: [id number]           Patient Information     Date Of Birth          1985        Visit Information        Provider Department      8/23/2018 8:15 AM Marialuisa Moura CNM [facility]        Today's Diagnoses     Encounter for supervision of normal first pregnancy in second trimester    -  1       Follow-ups after your visit        Follow-up notes from your care team     Return in about 4 weeks (around 9/20/2018) for JOHN HUA.      Your next 10 appointments already scheduled     Sep 20, 2018  8:15 AM CDT   RETURN OB with AMANDA Coreas CNM   [facility] ([facility])    [facility] [address]  [address]  [address]  [facility] [address] [phone]              Who to contact     Please call your clinic at [phone] to:    Ask questions about your health    Make or cancel appointments    Discuss your medicines    Learn about your test results    Speak to your doctor            Additional Information About Your Visit        MyChart Information     [employer] gives you secure access to your electronic health record. If you see a primary care provider, you can also send messages to your care team and make appointments. If you have questions, please call your primary care clinic.  If you do not have a primary care provider, please call [phone] and they will assist you.      [employer] is an electronic gateway that provides easy, online access to your medical records. With [employer], you can request a clinic appointment, read your test results, renew a prescription or communicate with your care team.     To access your existing account, please contact your [facility] or call [phone] for assistance.        Care EveryWhere ID     This is your Care EveryWhere ID. This could be used by other organizations to access  "your [place] medical records  [id number]        Your Vitals Were     Pulse Height Last Period BMI (Body Mass Index)          88 1.6 m (5' 3\") 04/08/2018 (Exact Date) 30.65 kg/m2         Blood Pressure from Last 3 Encounters:   08/23/18 115/78   07/19/18 134/74   06/21/18 115/78    Weight from Last 3 Encounters:   08/23/18 78.5 kg (173 lb)   07/19/18 77.6 kg (171 lb)   06/21/18 78.2 kg (172 lb 4.8 oz)              Today, you had the following     No orders found for display       Primary Care Provider    None Specified       No primary provider on file.        Equal Access to Services     [facility] : Shira Saleh, connie brar, isidro finney, zainab bains . So [facility] [phone].    ATENCIÓN: Si habla español, tiene a car disposición servicios gratuitos de asistencia lingüística. Llame al [phone].    We comply with applicable federal civil rights laws and [place] laws. We do not discriminate on the basis of race, color, national origin, age, disability, sex, sexual orientation, or gender identity.            Thank you!     Thank you for choosing [facility]  for your care. Our goal is always to provide you with excellent care. Hearing back from our patients is one way we can continue to improve our services. Please take a few minutes to complete the written survey that you may receive in the mail after your visit with us. Thank you!             Your Updated Medication List - Protect others around you: Learn how to safely use, store and throw away your medicines at www.disposemymeds.org.          This list is accurate as of 8/23/18 11:59 PM.  Always use your most recent med list.                   Brand Name Dispense Instructions for use Diagnosis    FISH OIL PO      Take by mouth daily        HERBALS           PRENATAL PO      Take by mouth daily        probiotic Caps           VITAMIN D (CHOLECALCIFEROL) PO      Take by mouth " daily

## 2018-08-23 NOTE — PROGRESS NOTES
"Subjective:      33 year old  at 19w4d presents for a routine prenatal appointment.        Denies cramping/contractions, vaginal bleeding, discharge or leakage of fluid. Report +fetal movement.  No HA, vision changes, ruq/epigastric pain.        Patient concerns: Feeling well. Had level 2 ultrasound- having a boy! Has noticed some occasional lower back pain. Also experiencing some constipation.   Has a question about a small bump she felt under her right armpit - has already started to go away but wants to make sure it is ok.     Objective:  Vitals:    18 0818   BP: 115/78   Pulse: 88   Weight: 78.5 kg (173 lb)   Height: 1.6 m (5' 3\")       See OB flowsheet    Exam:  Initially unable to palpate what patient was describing. Pt then also having difficulty finding what she felt before as it has almost gone away. With deeper palpation felt a normal sized central axillary lymph node. Nontender.    Assessment/Plan     Encounter Diagnosis   Name Primary?     Encounter for supervision of normal first pregnancy in second trimester Yes     - Reviewed total weight gain, encouraged continued healthy diet and exercise.      - Reviewed why/how to contact provider.    Patient education/orders or handouts today:  PTL signs/symptoms and Fetal movement     Reviewed level 2 ultrasound.  Notify provider if notices change in size of lymph node.     Continue scheduled prenatal care, RTC in 4 weeks and prn if questions or concerns.     AMANDA Landa, MELITA                "

## 2018-08-23 NOTE — LETTER
"2018       RE: Vianca Valle  [id number]  [place] [address]     Dear Colleague,    Thank you for referring your patient, Vianca Valle, to the [facility] at [facility]. Please see a copy of my visit note below.    Subjective:      33 year old  at 19w4d presents for a routine prenatal appointment.        Denies cramping/contractions, vaginal bleeding, discharge or leakage of fluid. Report +fetal movement.  No HA, vision changes, ruq/epigastric pain.        Patient concerns: Feeling well. Had level 2 ultrasound- having a boy! Has noticed some occasional lower back pain. Also experiencing some constipation.   Has a question about a small bump she felt under her right armpit - has already started to go away but wants to make sure it is ok.     Objective:  Vitals:    18 0818   BP: 115/78   Pulse: 88   Weight: 78.5 kg (173 lb)   Height: 1.6 m (5' 3\")       See OB flowsheet    Exam:  Initially unable to palpate what patient was describing. Pt then also having difficulty finding what she felt before as it has almost gone away. With deeper palpation felt a normal sized central axillary lymph node. Nontender.    Assessment/Plan     Encounter Diagnosis   Name Primary?     Encounter for supervision of normal first pregnancy in second trimester Yes     - Reviewed total weight gain, encouraged continued healthy diet and exercise.      - Reviewed why/how to contact provider.    Patient education/orders or handouts today:  PTL signs/symptoms and Fetal movement     Reviewed level 2 ultrasound.  Notify provider if notices change in size of lymph node.     Continue scheduled prenatal care, RTC in 4 weeks and prn if questions or concerns.     Marialuisa Moura, APRN, CNM      "

## 2018-09-20 ENCOUNTER — OFFICE VISIT (OUTPATIENT)
Dept: OBGYN | Facility: CLINIC | Age: 33
End: 2018-09-20
Attending: ADVANCED PRACTICE MIDWIFE
Payer: COMMERCIAL

## 2018-09-20 VITALS
WEIGHT: 176.4 LBS | BODY MASS INDEX: 31.25 KG/M2 | HEIGHT: 63 IN | HEART RATE: 82 BPM | SYSTOLIC BLOOD PRESSURE: 112 MMHG | DIASTOLIC BLOOD PRESSURE: 75 MMHG

## 2018-09-20 DIAGNOSIS — Z34.03 ENCOUNTER FOR SUPERVISION OF NORMAL FIRST PREGNANCY IN THIRD TRIMESTER: Primary | ICD-10-CM

## 2018-09-20 PROCEDURE — G0463 HOSPITAL OUTPT CLINIC VISIT: HCPCS | Mod: ZF

## 2018-09-20 NOTE — MR AVS SNAPSHOT
After Visit Summary   9/20/2018    Vianca Valle    MRN: [id number]           Patient Information     Date Of Birth          1985        Visit Information        Provider Department      9/20/2018 8:15 AM Edvin Burton APRN [facility]        Today's Diagnoses     Encounter for supervision of normal first pregnancy in third trimester    -  1       Follow-ups after your visit        Follow-up notes from your care team     Return in about 4 weeks (around 10/18/2018) for EOB Visit.      Your next 10 appointments already scheduled     Oct 18, 2018  8:15 AM CDT   Return Obstetrics Extended with Marialuisa Moura CNM   [facility] ([facility])    [facility] [address]  [address]  [facility] [address] [phone]              Who to contact     Please call your clinic at [phone] to:    Ask questions about your health    Make or cancel appointments    Discuss your medicines    Learn about your test results    Speak to your doctor            Additional Information About Your Visit        MyChart Information     QReca! gives you secure access to your electronic health record. If you see a primary care provider, you can also send messages to your care team and make appointments. If you have questions, please call your primary care clinic.  If you do not have a primary care provider, please call [phone] and they will assist you.      QReca! is an electronic gateway that provides easy, online access to your medical records. With QReca!, you can request a clinic appointment, read your test results, renew a prescription or communicate with your care team.     To access your existing account, please contact your [facility] or call [phone] for assistance.        Care EveryWhere ID     This is your Care EveryWhere ID. This could be used by other  "organizations to access your [place] medical records  [id number]        Your Vitals Were     Pulse Height Last Period BMI (Body Mass Index)          82 1.6 m (5' 3\") 04/08/2018 (Exact Date) 31.25 kg/m2         Blood Pressure from Last 3 Encounters:   09/20/18 112/75   08/23/18 115/78   07/19/18 134/74    Weight from Last 3 Encounters:   09/20/18 80 kg (176 lb 6.4 oz)   08/23/18 78.5 kg (173 lb)   07/19/18 77.6 kg (171 lb)              Today, you had the following     No orders found for display       Primary Care Provider    None Specified       No primary provider on file.        Equal Access to Services     SHELLEY SAVAGE : Shira Saleh, connie brar, isidro finney, zainab bains . So [facility] [phone].    ATENCIÓN: Si habla español, tiene a car disposición servicios gratuitos de asistencia lingüística. Llame al [phone].    We comply with applicable federal civil rights laws and [place] laws. We do not discriminate on the basis of race, color, national origin, age, disability, sex, sexual orientation, or gender identity.            Thank you!     Thank you for choosing [facility]  for your care. Our goal is always to provide you with excellent care. Hearing back from our patients is one way we can continue to improve our services. Please take a few minutes to complete the written survey that you may receive in the mail after your visit with us. Thank you!             Your Updated Medication List - Protect others around you: Learn how to safely use, store and throw away your medicines at www.disposemymeds.org.          This list is accurate as of 9/20/18  8:26 AM.  Always use your most recent med list.                   Brand Name Dispense Instructions for use Diagnosis    FISH OIL PO      Take by mouth daily        HERBALS           PRENATAL PO      Take by mouth daily        probiotic Caps           VITAMIN D (CHOLECALCIFEROL) " PO      Take by mouth daily

## 2018-09-20 NOTE — LETTER
"2018     RE: Vianca Valle  [id number]  [place] [address]     Dear Colleague,    Thank you for referring your patient, Vianca Valle, to the [facility] at [facility]. Please see a copy of my visit note below.    Subjective:     33 year old  at 23w4d presents for a routine prenatal appointment.      Denies vaginal bleeding or leakage of fluid.  Denies contractions or cramping. + fetal movement.       No HA, visual changes, RUQ or epigastric pain.   The patient presents with the following concerns:    - constipation- has been taking Benefiber x 1 week without much improvement, bowel movements every 1-2 days, straining, small hard stool   - general stiffness upon waking   - occasional muscle cramps      Objective:  Vitals:    18 0809   BP: 112/75   Pulse: 82   Weight: 80 kg (176 lb 6.4 oz)   Height: 1.6 m (5' 3\")     See OB flowsheet    Assessment/Plan:  Encounter Diagnosis   Name Primary?     Encounter for supervision of normal first pregnancy in third trimester Yes     - Discussed increased fiber and fluids in diet, prunes, digestive teas, and if need stool softeners.  - Reviewed total weight gain, encouraged continued healthy diet and exercise.      - Reviewed why/how to contact provider.   - Patient education/orders or handouts today: PTL signs/symptoms, Fetal movement and Plan for EOB visit w labs  - Return to clinic in 4-5 weeks for EOB visit and prn if questions or concerns.     Again, thank you for allowing me to participate in the care of your patient.      Sincerely,    AMANDA Coon CNM      "

## 2018-09-20 NOTE — PROGRESS NOTES
"Subjective:     33 year old  at 23w4d presents for a routine prenatal appointment.      Denies vaginal bleeding or leakage of fluid.  Denies contractions or cramping. + fetal movement.       No HA, visual changes, RUQ or epigastric pain.   The patient presents with the following concerns:    - constipation- has been taking Benefiber x 1 week without much improvement, bowel movements every 1-2 days, straining, small hard stool   - general stiffness upon waking   - occasional muscle cramps      Objective:  Vitals:    18 0809   BP: 112/75   Pulse: 82   Weight: 80 kg (176 lb 6.4 oz)   Height: 1.6 m (5' 3\")     See OB flowsheet    Assessment/Plan:  Encounter Diagnosis   Name Primary?     Encounter for supervision of normal first pregnancy in third trimester Yes     - Discussed increased fiber and fluids in diet, prunes, digestive teas, and if need stool softeners.  - Reviewed total weight gain, encouraged continued healthy diet and exercise.      - Reviewed why/how to contact provider.   - Patient education/orders or handouts today: PTL signs/symptoms, Fetal movement and Plan for EOB visit w labs  - Return to clinic in 4-5 weeks for EOB visit and prn if questions or concerns.   "

## 2018-10-18 ENCOUNTER — OFFICE VISIT (OUTPATIENT)
Dept: OBGYN | Facility: CLINIC | Age: 33
End: 2018-10-18
Attending: ADVANCED PRACTICE MIDWIFE
Payer: COMMERCIAL

## 2018-10-18 ENCOUNTER — TELEPHONE (OUTPATIENT)
Dept: OBGYN | Facility: CLINIC | Age: 33
End: 2018-10-18

## 2018-10-18 VITALS
BODY MASS INDEX: 31.57 KG/M2 | WEIGHT: 178.2 LBS | SYSTOLIC BLOOD PRESSURE: 117 MMHG | DIASTOLIC BLOOD PRESSURE: 83 MMHG | HEIGHT: 63 IN

## 2018-10-18 DIAGNOSIS — R73.09 ELEVATED GLUCOSE: Primary | ICD-10-CM

## 2018-10-18 DIAGNOSIS — Z34.03 ENCOUNTER FOR SUPERVISION OF NORMAL FIRST PREGNANCY IN THIRD TRIMESTER: Primary | ICD-10-CM

## 2018-10-18 DIAGNOSIS — Z23 NEED FOR VACCINATION: ICD-10-CM

## 2018-10-18 PROBLEM — O99.810 ABNORMAL GLUCOSE AFFECTING PREGNANCY: Status: ACTIVE | Noted: 2018-10-18

## 2018-10-18 LAB
DEPRECATED CALCIDIOL+CALCIFEROL SERPL-MC: 65 UG/L (ref 20–75)
ERYTHROCYTE [DISTWIDTH] IN BLOOD BY AUTOMATED COUNT: 13.4 % (ref 10–15)
GLUCOSE 1H P 50 G GLC PO SERPL-MCNC: 153 MG/DL (ref 60–129)
HCT VFR BLD AUTO: 36.6 % (ref 35–47)
HGB BLD-MCNC: 11.9 G/DL (ref 11.7–15.7)
MCH RBC QN AUTO: 29.9 PG (ref 26.5–33)
MCHC RBC AUTO-ENTMCNC: 32.5 G/DL (ref 31.5–36.5)
MCV RBC AUTO: 92 FL (ref 78–100)
PLATELET # BLD AUTO: 234 10E9/L (ref 150–450)
RBC # BLD AUTO: 3.98 10E12/L (ref 3.8–5.2)
T PALLIDUM AB SER QL: NONREACTIVE
WBC # BLD AUTO: 14.8 10E9/L (ref 4–11)

## 2018-10-18 PROCEDURE — G0008 ADMIN INFLUENZA VIRUS VAC: HCPCS | Mod: ZF

## 2018-10-18 PROCEDURE — 82306 VITAMIN D 25 HYDROXY: CPT | Performed by: ADVANCED PRACTICE MIDWIFE

## 2018-10-18 PROCEDURE — 90686 IIV4 VACC NO PRSV 0.5 ML IM: CPT | Mod: ZF

## 2018-10-18 PROCEDURE — 85027 COMPLETE CBC AUTOMATED: CPT | Performed by: ADVANCED PRACTICE MIDWIFE

## 2018-10-18 PROCEDURE — 82950 GLUCOSE TEST: CPT | Performed by: ADVANCED PRACTICE MIDWIFE

## 2018-10-18 PROCEDURE — 36415 COLL VENOUS BLD VENIPUNCTURE: CPT | Performed by: ADVANCED PRACTICE MIDWIFE

## 2018-10-18 PROCEDURE — 25000128 H RX IP 250 OP 636: Mod: ZF

## 2018-10-18 PROCEDURE — 86780 TREPONEMA PALLIDUM: CPT | Performed by: ADVANCED PRACTICE MIDWIFE

## 2018-10-18 ASSESSMENT — ANXIETY QUESTIONNAIRES
2. NOT BEING ABLE TO STOP OR CONTROL WORRYING: NOT AT ALL
6. BECOMING EASILY ANNOYED OR IRRITABLE: SEVERAL DAYS
5. BEING SO RESTLESS THAT IT IS HARD TO SIT STILL: NOT AT ALL
GAD7 TOTAL SCORE: 1
1. FEELING NERVOUS, ANXIOUS, OR ON EDGE: NOT AT ALL
3. WORRYING TOO MUCH ABOUT DIFFERENT THINGS: NOT AT ALL
7. FEELING AFRAID AS IF SOMETHING AWFUL MIGHT HAPPEN: NOT AT ALL

## 2018-10-18 ASSESSMENT — PATIENT HEALTH QUESTIONNAIRE - PHQ9: 5. POOR APPETITE OR OVEREATING: NOT AT ALL

## 2018-10-18 NOTE — LETTER
"10/18/2018       RE: Vianca Valle  [id number]  [place] [address]     Dear Colleague,    Thank you for referring your patient, Vianca Valle, to the [facility] at [facility]. Please see a copy of my visit note below.     33 year old, , 27w4d, presents for EOB visit.     Patient concerns: Feeling well. Present with  who is engaged and supportive. Have signed up for [facility] classes- [place] [employer], Infant CPR, Breastfeeding, Preparing for Childbirth- are covered by her insurance. Interested in taking a BP tour.    Denies cramping/contractions, vaginal bleeding, discharge or leakage of fluid. Reports +fetal movement.  No HA, vision changes, ruq/epigastric pain.      PHQ9= 3, GAD7= 1    Education completed today includes breast feeding, [facility] hand out , contraception, counting movements, signs of pre-term labor, when to present to birthplace, post partum depression, GBS, getting enough iron and labor induction.  Birth preferences reviewed: Unmedicated, interested in nitrous oxide; Could be open to epidural  Interested in skin to skin, delayed cord clamping. Dad unsure about cutting the cord.  Thinks she would be ok with IV saline lock on admission.   Agreeable to AMTSL.    Labor support:      Feeding plans : Breastfeeding    Contraception planned:  Undecided. Thinking condoms- \"less intrusive.\"   The following labs were ordered today:       GCT, CBC w platelets, Vitamin D and Anti-treponema  Water birth consent form was given - not interested.     Blood type:   ABO   Date Value Ref Range Status   2018 A  Final     RH(D)   Date Value Ref Range Status   2018 Pos  Final     Antibody Screen   Date Value Ref Range Status   2018 Neg  Final     Rhogam  was not given.    TDAP was not given- plan for next visit.    A/P:  Encounter Diagnosis   Name Primary?     Encounter for supervision of normal first " pregnancy in third trimester Yes     Orders Placed This Encounter   Procedures     Glucose 1 Hour     CBC with Platelets     Treponema Abs w Reflex to RPR and Titer     25- OH-Vitamin D     EOB education reviewed.  EOB labs ordered- 1 hour glucose, cbc with plts, anti-trep, vitamin D.  Flu vaccine given.  Plan tdap at next visit.     Continue scheduled prenatal care, RTC in 3-4 weeks.    AMANDA Landa, DUSTINM

## 2018-10-18 NOTE — NURSING NOTE
"Injectable Influenza Immunization Documentation    1.  Has the patient received the information for the injectable influenza vaccine? YES     2. Is the patient 6 months of age or older? YES     3. Does the patient have any of the following contraindications?         Severe allergy to eggs?  No     Severe allergic reaction to previous influenza vaccines?  No   Severe allergy to latex?  No       History of Guillain-[place] syndrome?  No     Currently have a temperature greater than 100.4F?  No        4.  Severely egg allergic patients should have flu vaccine eligibility assessed by an MD, RN, or pharmacist, and those who received flu vaccine should be observed for 15 min by an MD, RN, Pharmacist, Medical Technician, or member of clinic staff.\": YES    5. Latex-allergic patients should be given latex-free influenza vaccine Yes. Please reference the Vaccine latex table to determine if your clinic s product is latex-containing.       Vaccination given by Chloe Dumas          "

## 2018-10-18 NOTE — TELEPHONE ENCOUNTER
Patient returned call re: 1 hour glucose test results. Advised patient will need 3 hour glucose and educated on the need for this further screening. Patient expressed understanding and will present to lab for 3 hr GTT tomorrow (10/19)

## 2018-10-18 NOTE — MR AVS SNAPSHOT
After Visit Summary   10/18/2018    Vianca Valle    MRN: [id number]           Patient Information     Date Of Birth          1985        Visit Information        Provider Department      10/18/2018 8:15 AM Marialuisa Moura CNM [facility]        Today's Diagnoses     Encounter for supervision of normal first pregnancy in third trimester    -  1    Need for vaccination           Follow-ups after your visit        Follow-up notes from your care team     Return in about 4 weeks (around 11/15/2018) for JOHN HUA.      Your next 10 appointments already scheduled     Nov 08, 2018  8:30 AM CST   RETURN OB with Marialuisa Moura CNM   [facility] ([facility])    [facility] [address]  [address]  [address]  [facility] [id number] [phone]              Who to contact     Please call your clinic at [phone] to:    Ask questions about your health    Make or cancel appointments    Discuss your medicines    Learn about your test results    Speak to your doctor            Additional Information About Your Visit        MyChart Information     j-Grab gives you secure access to your electronic health record. If you see a primary care provider, you can also send messages to your care team and make appointments. If you have questions, please call your primary care clinic.  If you do not have a primary care provider, please call [phone] and they will assist you.      j-Grab is an electronic gateway that provides easy, online access to your medical records. With j-Grab, you can request a clinic appointment, read your test results, renew a prescription or communicate with your care team.     To access your existing account, please contact your [facility] or call [phone] for assistance.        Care EveryWhere ID     This is your Care EveryWhere ID. This  "could be used by other organizations to access your [place] medical records  [id number]        Your Vitals Were     Height Last Period BMI (Body Mass Index)             1.6 m (5' 3\") 04/08/2018 (Exact Date) 31.57 kg/m2          Blood Pressure from Last 3 Encounters:   10/18/18 117/83   09/20/18 112/75   08/23/18 115/78    Weight from Last 3 Encounters:   10/18/18 80.8 kg (178 lb 3.2 oz)   09/20/18 80 kg (176 lb 6.4 oz)   08/23/18 78.5 kg (173 lb)              We Performed the Following     25- OH-Vitamin D     CBC with Platelets     Glucose 1 Hour     HC FLU VAC PRESRV FREE QUAD SPLIT VIR 3+YRS IM     Treponema Abs w Reflex to RPR and Titer        Primary Care Provider    None Specified       No primary provider on file.        Equal Access to Services     SHELLEY SAVAGE : Hadanthony Saleh, connie brar, isidro finney, zainab bains . So [facility] [phone].    ATENCIÓN: Si habla español, tiene a car disposición servicios gratuitos de asistencia lingüística. Llame al [phone].    We comply with applicable federal civil rights laws and [place] laws. We do not discriminate on the basis of race, color, national origin, age, disability, sex, sexual orientation, or gender identity.            Thank you!     Thank you for choosing [facility]  for your care. Our goal is always to provide you with excellent care. Hearing back from our patients is one way we can continue to improve our services. Please take a few minutes to complete the written survey that you may receive in the mail after your visit with us. Thank you!             Your Updated Medication List - Protect others around you: Learn how to safely use, store and throw away your medicines at www.disposemymeds.org.          This list is accurate as of 10/18/18  9:22 AM.  Always use your most recent med list.                   Brand Name Dispense Instructions for use Diagnosis    FISH " OIL PO      Take by mouth daily        HERBALS           PRENATAL PO      Take by mouth daily        probiotic Caps           VITAMIN D (CHOLECALCIFEROL) PO      Take by mouth daily

## 2018-10-18 NOTE — PROGRESS NOTES
" 33 year old, , 27w4d, presents for EOB visit.     Patient concerns: Feeling well. Present with  who is engaged and supportive. Have signed up for [facility] classes- [place] 101, Infant CPR, Breastfeeding, Preparing for Childbirth- are covered by her insurance. Interested in taking a BP tour.    Denies cramping/contractions, vaginal bleeding, discharge or leakage of fluid. Reports +fetal movement.  No HA, vision changes, ruq/epigastric pain.      PHQ9= 3, GAD7= 1    Education completed today includes breast feeding, [facility] hand out , contraception, counting movements, signs of pre-term labor, when to present to birthplace, post partum depression, GBS, getting enough iron and labor induction.  Birth preferences reviewed: Unmedicated, interested in nitrous oxide; Could be open to epidural  Interested in skin to skin, delayed cord clamping. Dad unsure about cutting the cord.  Thinks she would be ok with IV saline lock on admission.   Agreeable to AMTSL.    Labor support:     [place] Feeding plans : Breastfeeding    Contraception planned:  Undecided. Thinking condoms- \"less intrusive.\"   The following labs were ordered today:       GCT, CBC w platelets, Vitamin D and Anti-treponema  Water birth consent form was given - not interested.     Blood type:   ABO   Date Value Ref Range Status   2018 A  Final     RH(D)   Date Value Ref Range Status   2018 Pos  Final     Antibody Screen   Date Value Ref Range Status   2018 Neg  Final     Rhogam  was not given.    TDAP was not given- plan for next visit.    A/P:  Encounter Diagnosis   Name Primary?     Encounter for supervision of normal first pregnancy in third trimester Yes     Orders Placed This Encounter   Procedures     Glucose 1 Hour     CBC with Platelets     Treponema Abs w Reflex to RPR and Titer     25- OH-Vitamin D     EOB education reviewed.  EOB labs ordered- 1 hour glucose, cbc with plts, anti-trep, vitamin D.  Flu vaccine " given.  Plan tdap at next visit.     Continue scheduled prenatal care, RTC in 3-4 weeks.    AMANDA Landa, MELITA

## 2018-10-19 ASSESSMENT — ANXIETY QUESTIONNAIRES: GAD7 TOTAL SCORE: 1

## 2018-10-19 ASSESSMENT — PATIENT HEALTH QUESTIONNAIRE - PHQ9: SUM OF ALL RESPONSES TO PHQ QUESTIONS 1-9: 3

## 2018-11-08 ENCOUNTER — OFFICE VISIT (OUTPATIENT)
Dept: OBGYN | Facility: CLINIC | Age: 33
End: 2018-11-08
Attending: ADVANCED PRACTICE MIDWIFE
Payer: COMMERCIAL

## 2018-11-08 VITALS
HEART RATE: 98 BPM | DIASTOLIC BLOOD PRESSURE: 81 MMHG | BODY MASS INDEX: 32.07 KG/M2 | SYSTOLIC BLOOD PRESSURE: 117 MMHG | HEIGHT: 63 IN | WEIGHT: 181 LBS

## 2018-11-08 DIAGNOSIS — Z34.03 ENCOUNTER FOR SUPERVISION OF NORMAL FIRST PREGNANCY IN THIRD TRIMESTER: Primary | ICD-10-CM

## 2018-11-08 PROCEDURE — 90715 TDAP VACCINE 7 YRS/> IM: CPT | Mod: ZF

## 2018-11-08 PROCEDURE — G0463 HOSPITAL OUTPT CLINIC VISIT: HCPCS | Mod: 25

## 2018-11-08 PROCEDURE — 25000128 H RX IP 250 OP 636: Mod: ZF

## 2018-11-08 PROCEDURE — 90471 IMMUNIZATION ADMIN: CPT | Mod: ZF

## 2018-11-08 NOTE — LETTER
"2018       RE: Vianca Valle  [id number]  [place] [address]     Dear Colleague,    Thank you for referring your patient, Vianca Valle, to the [facility] at [facility]. Please see a copy of my visit note below.    Subjective:      33 year old  at 30w4d presentst for a routine prenatal appointment.     Denies cramping/contractions, vaginal bleeding, discharge or leakage of fluid. Reports +fetal movement.  No HA, vision changes, ruq/epigastric pain.      Patient concerns: Feeling well overall. Reports some mild discomfort when sits for prolonged periods- feels like baby is in her ribs. Reports discomfort improves when standing and ambulating.   Has some questions about CNM vs. MD care. Taking breastfeeding and  class at [facility] this weekend.     Objective:  Vitals:    18 0838   BP: 117/81   Pulse: 98   Weight: 82.1 kg (181 lb)   Height: 1.6 m (5' 3\")       See ob flowsheet    Assessment/Plan     Encounter Diagnosis   Name Primary?     Encounter for supervision of normal first pregnancy in third trimester Yes     Orders Placed This Encounter   Procedures     TDAP VACCINE (BOOSTRIX)     -Reviewed total weight gain - pt is tracking with recommended weight gain. Reviewed weight gain in third trimester - often gaining 1#/week. Encouraged healthy diet, exercise.    -Reviewed importance of daily fetal kick count and why/how to contact provider.  - Reviewed why/how to contact provider if headache/visual changes/RUQ or epigastric pain, decreased fetal movement, vaginal bleeding, leakage of fluid or more than 4 contractions in an hour.    Patient education/orders or handouts today:  TDAP,  labor precautions, fetal movement    - Reviewed EOB labs. Reviewed passing 3 hour glucose test.  - Discussed reducing vitamin D supplementation to 1000- 2000 international unit(s)/day.  - Reviewed CNM vs MD care. Handout provided on " "CNM group. Pt will continue to think about it.   - Tdap given.    Continue scheduled prenatal care, RTC in 2 weeks and prn if questions or concerns.      I, ACOSTA Waters, am serving as a scribe to document services personally performed by CNM based on the provider's statements to me.\"- ACOSTA Waters    The encounter was performed by me and scribed by the SNM. The scribed note accurately reflects my personal services and decisions made by me.     AMANDA Landa, MELITA       "

## 2018-11-08 NOTE — PROGRESS NOTES
"Subjective:      33 year old  at 30w4d presentst for a routine prenatal appointment.     Denies cramping/contractions, vaginal bleeding, discharge or leakage of fluid. Reports +fetal movement.  No HA, vision changes, ruq/epigastric pain.      Patient concerns: Feeling well overall. Reports some mild discomfort when sits for prolonged periods- feels like baby is in her ribs. Reports discomfort improves when standing and ambulating.   Has some questions about CNM vs. MD care. Taking breastfeeding and  class at [facility] this weekend.     Objective:  Vitals:    18 0838   BP: 117/81   Pulse: 98   Weight: 82.1 kg (181 lb)   Height: 1.6 m (5' 3\")       See ob flowsheet    Assessment/Plan     Encounter Diagnosis   Name Primary?     Encounter for supervision of normal first pregnancy in third trimester Yes     Orders Placed This Encounter   Procedures     TDAP VACCINE (BOOSTRIX)     -Reviewed total weight gain - pt is tracking with recommended weight gain. Reviewed weight gain in third trimester - often gaining 1#/week. Encouraged healthy diet, exercise.    -Reviewed importance of daily fetal kick count and why/how to contact provider.  - Reviewed why/how to contact provider if headache/visual changes/RUQ or epigastric pain, decreased fetal movement, vaginal bleeding, leakage of fluid or more than 4 contractions in an hour.    Patient education/orders or handouts today:  TDAP,  labor precautions, fetal movement    - Reviewed EOB labs. Reviewed passing 3 hour glucose test.  - Discussed reducing vitamin D supplementation to 1000- 2000 international unit(s)/day.  - Reviewed CNM vs MD care. Handout provided on CNM group. Pt will continue to think about it.   - Tdap given.    Continue scheduled prenatal care, RTC in 2 weeks and prn if questions or concerns.      Fatemeh HYATT SNM, am serving as a scribe to document services personally performed by CNM based on the provider's statements to me.\"- Fatemeh " ACOSTA Rodriguez    The encounter was performed by me and scribed by the SNM. The scribed note accurately reflects my personal services and decisions made by me.     AMANDA Landa, MELITA

## 2018-11-08 NOTE — MR AVS SNAPSHOT
After Visit Summary   11/8/2018    Vianca Valle    MRN: [id number]           Patient Information     Date Of Birth          1985        Visit Information        Provider Department      11/8/2018 8:30 AM Marialuisa Moura CNM [facility]        Today's Diagnoses     Encounter for supervision of normal first pregnancy in third trimester    -  1       Follow-ups after your visit        Follow-up notes from your care team     Return in about 2 weeks (around 11/22/2018) for JOHN HUA.      Your next 10 appointments already scheduled     Nov 21, 2018  8:15 AM CST   RETURN OB with AMANDA Cotton CNM   [facility] ([facility])    [facility] [address] [facility] [address] [phone]            Dec 06, 2018  8:15 AM CST   RETURN OB with AMANDA Orozco CNM   [facility] ([facility])    [facility] [address]  [address] [facility] [address] [phone]              Who to contact     Please call your clinic at [phone] to:    Ask questions about your health    Make or cancel appointments    Discuss your medicines    Learn about your test results    Speak to your doctor            Additional Information About Your Visit        MyChart Information     [employer] gives you secure access to your electronic health record. If you see a primary care provider, you can also send messages to your care team and make appointments. If you have questions, please call your primary care clinic.  If you do not have a primary care provider, please call [phone] and they will assist you.      [employer] is an electronic gateway that provides easy, online access to your medical records. With [employer], you can request a clinic appointment, read your test results, renew a prescription or communicate with your care team.    "  To access your existing account, please contact your [facility] or call [phone] for assistance.        Care EveryWhere ID     This is your Care EveryWhere ID. This could be used by other organizations to access your [place] medical records  [id number]        Your Vitals Were     Pulse Height Last Period BMI (Body Mass Index)          98 1.6 m (5' 3\") 04/08/2018 (Exact Date) 32.06 kg/m2         Blood Pressure from Last 3 Encounters:   11/08/18 117/81   10/18/18 117/83   09/20/18 112/75    Weight from Last 3 Encounters:   11/08/18 82.1 kg (181 lb)   10/18/18 80.8 kg (178 lb 3.2 oz)   09/20/18 80 kg (176 lb 6.4 oz)              We Performed the Following     TDAP VACCINE (BOOSTRIX)        Primary Care Provider    None Specified       No primary provider on file.        Equal Access to Services     [facility]: Hadii filomena Saleh, wamichael brar, isidro kaalmada reg, zainab bains . So [facility] [phone].    ATENCIÓN: Si habla español, tiene a car disposición servicios gratuitos de asistencia lingüística. Winifred al [phone].    We comply with applicable federal civil rights laws and [place] laws. We do not discriminate on the basis of race, color, national origin, age, disability, sex, sexual orientation, or gender identity.            Thank you!     Thank you for choosing [facility]  for your care. Our goal is always to provide you with excellent care. Hearing back from our patients is one way we can continue to improve our services. Please take a few minutes to complete the written survey that you may receive in the mail after your visit with us. Thank you!             Your Updated Medication List - Protect others around you: Learn how to safely use, store and throw away your medicines at www.disposemymeds.org.          This list is accurate as of 11/8/18 11:59 PM.  Always use your most recent med list.    "                Brand Name Dispense Instructions for use Diagnosis    FISH OIL PO      Take by mouth daily        HERBALS           PRENATAL PO      Take by mouth daily        probiotic Caps           VITAMIN D (CHOLECALCIFEROL) PO      Take by mouth daily

## 2018-11-21 ENCOUNTER — OFFICE VISIT (OUTPATIENT)
Dept: OBGYN | Facility: CLINIC | Age: 33
End: 2018-11-21
Attending: MIDWIFE
Payer: COMMERCIAL

## 2018-11-21 VITALS
BODY MASS INDEX: 32.18 KG/M2 | WEIGHT: 181.6 LBS | HEART RATE: 105 BPM | HEIGHT: 63 IN | DIASTOLIC BLOOD PRESSURE: 78 MMHG | SYSTOLIC BLOOD PRESSURE: 111 MMHG

## 2018-11-21 DIAGNOSIS — Z34.03 ENCOUNTER FOR SUPERVISION OF NORMAL FIRST PREGNANCY IN THIRD TRIMESTER: Primary | ICD-10-CM

## 2018-11-21 PROCEDURE — G0463 HOSPITAL OUTPT CLINIC VISIT: HCPCS | Mod: ZF

## 2018-11-21 NOTE — MR AVS SNAPSHOT
After Visit Summary   11/21/2018    Vianca Valle    MRN: [id number]           Patient Information     Date Of Birth          1985        Visit Information        Provider Department      11/21/2018 8:15 AM Meg Dia APRN CNM [facility]        Today's Diagnoses     Encounter for supervision of normal first pregnancy in third trimester    -  1       Follow-ups after your visit        Follow-up notes from your care team     Return in about 2 weeks (around 12/5/2018) for WADE.      Your next 10 appointments already scheduled     Dec 06, 2018  8:15 AM CST   RETURN OB with AMANDA Orozco CNM   [facility] ([facility])    [facility] [address]  [address]  [facility] [address] [phone]              Who to contact     Please call your clinic at [phone] to:    Ask questions about your health    Make or cancel appointments    Discuss your medicines    Learn about your test results    Speak to your doctor            Additional Information About Your Visit        MyChart Information     [employer] gives you secure access to your electronic health record. If you see a primary care provider, you can also send messages to your care team and make appointments. If you have questions, please call your primary care clinic.  If you do not have a primary care provider, please call [phone] and they will assist you.      Frontstart is an electronic gateway that provides easy, online access to your medical records. With Front[employer], you can request a clinic appointment, read your test results, renew a prescription or communicate with your care team.     To access your existing account, please contact your [facility] or call [phone] for assistance.        Care EveryWhere ID     This is your Care EveryWhere ID. This could be used by other organizations to access your [place]  "medical records  [id number]        Your Vitals Were     Pulse Height Last Period BMI (Body Mass Index)          105 1.6 m (5' 3\") 04/08/2018 (Exact Date) 32.17 kg/m2         Blood Pressure from Last 3 Encounters:   11/21/18 111/78   11/08/18 117/81   10/18/18 117/83    Weight from Last 3 Encounters:   11/21/18 82.4 kg (181 lb 9.6 oz)   11/08/18 82.1 kg (181 lb)   10/18/18 80.8 kg (178 lb 3.2 oz)              Today, you had the following     No orders found for display       Primary Care Provider    None Specified       No primary provider on file.        Equal Access to Services     [facility] : Shira Saleh, connie brar, isidro finney, zainab bains . So [facility] [phone].    ATENCIÓN: Si habla español, tiene a car disposición servicios gratuitos de asistencia lingüística. Llame al [phone].    We comply with applicable federal civil rights laws and [place] laws. We do not discriminate on the basis of race, color, national origin, age, disability, sex, sexual orientation, or gender identity.            Thank you!     Thank you for choosing [facility]  for your care. Our goal is always to provide you with excellent care. Hearing back from our patients is one way we can continue to improve our services. Please take a few minutes to complete the written survey that you may receive in the mail after your visit with us. Thank you!             Your Updated Medication List - Protect others around you: Learn how to safely use, store and throw away your medicines at www.disposemymeds.org.          This list is accurate as of 11/21/18  8:25 AM.  Always use your most recent med list.                   Brand Name Dispense Instructions for use Diagnosis    FISH OIL PO      Take by mouth daily        HERBALS           PRENATAL PO      Take by mouth daily        probiotic Caps           VITAMIN D (CHOLECALCIFEROL) PO      Take by mouth " daily

## 2018-11-21 NOTE — PROGRESS NOTES
"Subjective:      33 year old  at 32w3d presentst for a routine prenatal appointment.    no vaginal bleeding or leakage of fluid.  occ  contractions. Good  fetal movement.       No HA, visual changes, RUQ or epigastric pain.   Patient concerns: feeling well Peds    Feeling well overall.  Reviewed EOB labs with patient.  Reviewed TDAP Previously given  Objective:  Vitals:    18 0811   BP: 111/78   BP Location: Left arm   Patient Position: Chair   Pulse: 105   Weight: 82.4 kg (181 lb 9.6 oz)   Height: 1.6 m (5' 3\")   , see ob flowsheet  Assessment/Plan     Encounter Diagnosis   Name Primary?     Encounter for supervision of normal first pregnancy in third trimester Yes       ABO   Date Value Ref Range Status   2018 A  Final     RH(D)   Date Value Ref Range Status   2018 Pos  Final     Antibody Screen   Date Value Ref Range Status   2018 Neg  Final   , Rhogam  was notgiven.    - Reviewed total weight gain, encouraged continued healthy diet and exercise.  .  Reviewed importance of daily fetal kick count and why/how to contact provider.    - Reviewed why/how to contact provider if headache/visual changes/RUQ or epigastric pain, decreased fetal movement, vaginal bleeding, leakage of fluid or more than 4 contractions in an hour.     Patient education/orders or handouts today:  PTL signs/symptoms  Reviewed GBS screening at 35-36 wks.    Return to clinic in 2  weeks and prn if questions or concerns.     AMANDA Cotton CNM      "

## 2018-11-21 NOTE — LETTER
"2018       RE: Vianca Valle  [address]  [place] [address]     Dear Colleague,    Thank you for referring your patient, Vianca Valle, to the [facility] at [facility]. Please see a copy of my visit note below.    Subjective:      33 year old  at 32w3d presentst for a routine prenatal appointment.    no vaginal bleeding or leakage of fluid.  occ  contractions. Good  fetal movement.       No HA, visual changes, RUQ or epigastric pain.   Patient concerns: feeling well Peds    Feeling well overall.  Reviewed EOB labs with patient.  Reviewed TDAP Previously given  Objective:  Vitals:    18 0811   BP: 111/78   BP Location: Left arm   Patient Position: Chair   Pulse: 105   Weight: 82.4 kg (181 lb 9.6 oz)   Height: 1.6 m (5' 3\")   , see ob flowsheet  Assessment/Plan     Encounter Diagnosis   Name Primary?     Encounter for supervision of normal first pregnancy in third trimester Yes       ABO   Date Value Ref Range Status   2018 A  Final     RH(D)   Date Value Ref Range Status   2018 Pos  Final     Antibody Screen   Date Value Ref Range Status   2018 Neg  Final   , Rhogam  was notgiven.    - Reviewed total weight gain, encouraged continued healthy diet and exercise.  .  Reviewed importance of daily fetal kick count and why/how to contact provider.    - Reviewed why/how to contact provider if headache/visual changes/RUQ or epigastric pain, decreased fetal movement, vaginal bleeding, leakage of fluid or more than 4 contractions in an hour.     Patient education/orders or handouts today:  PTL signs/symptoms  Reviewed GBS screening at 35-36 wks.    Return to clinic in 2  weeks and prn if questions or concerns.     AMANDA Cotton CNM    "

## 2018-12-06 ENCOUNTER — OFFICE VISIT (OUTPATIENT)
Dept: OBGYN | Facility: CLINIC | Age: 33
End: 2018-12-06
Attending: ADVANCED PRACTICE MIDWIFE
Payer: COMMERCIAL

## 2018-12-06 VITALS
DIASTOLIC BLOOD PRESSURE: 80 MMHG | WEIGHT: 183.5 LBS | SYSTOLIC BLOOD PRESSURE: 117 MMHG | BODY MASS INDEX: 32.51 KG/M2 | HEART RATE: 94 BPM

## 2018-12-06 DIAGNOSIS — Z34.03 ENCOUNTER FOR SUPERVISION OF NORMAL FIRST PREGNANCY IN THIRD TRIMESTER: Primary | ICD-10-CM

## 2018-12-06 PROCEDURE — G0463 HOSPITAL OUTPT CLINIC VISIT: HCPCS | Mod: ZF

## 2018-12-06 ASSESSMENT — PAIN SCALES - GENERAL: PAINLEVEL: NO PAIN (0)

## 2018-12-06 NOTE — LETTER
2018       RE: Vianca Valle  [address]  [place] [address]     Dear Colleague,    Thank you for referring your patient, Vianca Valle, to the [facility] at [facility]. Please see a copy of my visit note below.    Subjective:      33 year old  at 34w4d presentst for a routine prenatal appointment.    Denies vaginal bleeding or leakage of fluid.  Occasional non painful tightening contractions. Good fetal movement.       No HA, visual changes, RUQ or epigastric pain.   Patient concerns: Bilaterally heel pain after being on feet through day. Questions regarding cord blood banking Feeling well overall.    Objective:  Vitals:    18 0812   BP: 117/80   BP Location: Left arm   Patient Position: Sitting   Cuff Size: Adult Regular   Pulse: 94   Weight: 83.2 kg (183 lb 8 oz)   , see ob flowsheet  Assessment/Plan     Encounter Diagnosis   Name Primary?     Encounter for supervision of normal first pregnancy in third trimester Yes       ABO   Date Value Ref Range Status   2018 A  Final     RH(D)   Date Value Ref Range Status   2018 Pos  Final     Antibody Screen   Date Value Ref Range Status   2018 Neg  Final     - Rhogam  was not given.  - Discussed putting feet up, using warm packs, and stretching to relieve heel pain.   - Considering cord blood banking, but patient would also like delayed cord clamping. Discussed possibility that there may not be enough blood to bank.   - Reviewed total weight gain, encouraged continued healthy diet and exercise.    - Reviewed importance of daily fetal kick count and why/how to contact provider.  - Reviewed why/how to contact provider if headache/visual changes/RUQ or epigastric pain, decreased fetal movement, vaginal bleeding, leakage of fluid or more than 4 contractions in an hour.     Patient education/orders or handouts today:  PTL signs/symptoms  Reviewed GBS screening  and CBC at 36 weeks.    Return to clinic in 2 weeks and prn if questions or concerns.     I, ACOSTA Allen, am serving as a scribe to document services personally performed by CNM based on the provider's statements to me. - ACOSTA Allen    The encounter was performed by me and scribed by the SNM. The scribed note accurately reflects my personal services and decisions made by me. AMANDA Orozco CNM        Again, thank you for allowing me to participate in the care of your patient.      Sincerely,    AMANDA Orozco CNM

## 2018-12-06 NOTE — NURSING NOTE
Chief Complaint   Patient presents with     Prenatal Care     34 weeks 4 days      Health Maintenance Due   Topic Date Due     REPEAT ANTIBODY SCREEN (OB)  10/21/2018     RH IMMUNE GLOBULIN (OB)  10/21/2018     GROUP B STREP SCREENING  12/16/2018     Olivia Sargent CMA on 12/6/2018 at 8:12 AM

## 2018-12-06 NOTE — PROGRESS NOTES
Subjective:      33 year old  at 34w4d presentst for a routine prenatal appointment.    Denies vaginal bleeding or leakage of fluid.  Occasional non painful tightening contractions. Good fetal movement.       No HA, visual changes, RUQ or epigastric pain.   Patient concerns: Bilaterally heel pain after being on feet through day. Questions regarding cord blood banking Feeling well overall.    Objective:  Vitals:    18 0812   BP: 117/80   BP Location: Left arm   Patient Position: Sitting   Cuff Size: Adult Regular   Pulse: 94   Weight: 83.2 kg (183 lb 8 oz)   , see ob flowsheet  Assessment/Plan     Encounter Diagnosis   Name Primary?     Encounter for supervision of normal first pregnancy in third trimester Yes       ABO   Date Value Ref Range Status   2018 A  Final     RH(D)   Date Value Ref Range Status   2018 Pos  Final     Antibody Screen   Date Value Ref Range Status   2018 Neg  Final     - Rhogam  was not given.  - Discussed putting feet up, using warm packs, and stretching to relieve heel pain.   - Considering cord blood banking, but patient would also like delayed cord clamping. Discussed possibility that there may not be enough blood to bank.   - Reviewed total weight gain, encouraged continued healthy diet and exercise.    - Reviewed importance of daily fetal kick count and why/how to contact provider.  - Reviewed why/how to contact provider if headache/visual changes/RUQ or epigastric pain, decreased fetal movement, vaginal bleeding, leakage of fluid or more than 4 contractions in an hour.     Patient education/orders or handouts today:  PTL signs/symptoms  Reviewed GBS screening and CBC at 36 weeks.    Return to clinic in 2 weeks and prn if questions or concerns.     IRichelle SNM, am serving as a scribe to document services personally performed by CNM based on the provider's statements to me. - ACOSTA Allen    The encounter was performed by me and scribed by the  SNM. The scribed note accurately reflects my personal services and decisions made by me. AMANDA OrozcoM

## 2018-12-06 NOTE — MR AVS SNAPSHOT
After Visit Summary   12/6/2018    Vianca Valle    MRN: [id number]           Patient Information     Date Of Birth          1985        Visit Information        Provider Department      12/6/2018 8:15 AM Sandra Stewart APRN [facility]        Today's Diagnoses     Encounter for supervision of normal first pregnancy in third trimester    -  1       Follow-ups after your visit        Follow-up notes from your care team     Return in about 2 weeks (around 12/20/2018) for WADE.      Your next 10 appointments already scheduled     Dec 07, 2018 12:40 PM CST   SHORT with Heidi Alcala MD   [facility] ([facility]    [address] [facility] [id number] [phone]            Dec 19, 2018  8:15 AM CST   RETURN OB with Marialuisa Moura CNM   [facility])    [facility] [address]  [facility] [id number] [phone]            Dec 28, 2018  8:15 AM CST   RETURN OB with AMANDA Cotton [facility]   [facility] ([facility])    [facility] [address]  [address]  [facility] [address]   [phone]            Jan 02, 2019  8:15 AM CST   RETURN OB with AMANDA Crowley [facility]   [facility] ([facility])    [facility] [address]  [address]  [facility] [id number] [phone]            Jan 07, 2019  8:15 AM CST   RETURN OB with Marialuisa Moura CNM   [facility] ([facility])    [facility] [address]  [address]  [facility] [id number] [phone]              Who to contact     Please call your clinic at [phone] to:    Ask questions about your health    Make or cancel  appointments    Discuss your medicines    Learn about your test results    Speak to your doctor            Additional Information About Your Visit        "[employer]hart Information     [employer] gives you secure access to your electronic health record. If you see a primary care provider, you can also send messages to your care team and make appointments. If you have questions, please call your primary care clinic.  If you do not have a primary care provider, please call [phone] and they will assist you.      [employer] is an electronic gateway that provides easy, online access to your medical records. With [employer], you can request a clinic appointment, read your test results, renew a prescription or communicate with your care team.     To access your existing account, please contact your [facility] or call [phone] for assistance.        Care EveryWhere ID     This is your Care EveryWhere ID. This could be used by other organizations to access your [place] medical records  [id number]        Your Vitals Were     Pulse Last Period BMI (Body Mass Index)             94 04/08/2018 (Exact Date) 32.51 kg/m2          Blood Pressure from Last 3 Encounters:   12/06/18 117/80   11/21/18 111/78   11/08/18 117/81    Weight from Last 3 Encounters:   12/06/18 83.2 kg (183 lb 8 oz)   11/21/18 82.4 kg (181 lb 9.6 oz)   11/08/18 82.1 kg (181 lb)              Today, you had the following     No orders found for display       Primary Care Provider    None Specified       No primary provider on file.        Equal Access to Services     [facility] : Hadii filomena Saleh, wasandrada luqadaha, qaybta kaalmada ademosesyada, zainab bains . So [facility] [phone].    ATENCIÓN: Si habla español, tiene a car disposición servicios gratuitos de asistencia lingüística. Llame al [phone].    We comply with applicable federal civil rights laws and [place] laws. We do not discriminate  on the basis of race, color, national origin, age, disability, sex, sexual orientation, or gender identity.            Thank you!     Thank you for choosing [facility]  for your care. Our goal is always to provide you with excellent care. Hearing back from our patients is one way we can continue to improve our services. Please take a few minutes to complete the written survey that you may receive in the mail after your visit with us. Thank you!             Your Updated Medication List - Protect others around you: Learn how to safely use, store and throw away your medicines at www.disposemymeds.org.          This list is accurate as of 12/6/18  9:22 AM.  Always use your most recent med list.                   Brand Name Dispense Instructions for use Diagnosis    FISH OIL PO      Take by mouth daily        HERBALS           PRENATAL PO      Take by mouth daily        probiotic Caps           VITAMIN D (CHOLECALCIFEROL) PO      Take by mouth daily

## 2018-12-07 ENCOUNTER — OFFICE VISIT (OUTPATIENT)
Dept: PEDIATRICS | Facility: CLINIC | Age: 33
End: 2018-12-07

## 2018-12-07 DIAGNOSIS — Z34.90 PRE-BIRTH VISIT FOR EXPECTANT MOTHER: Primary | ICD-10-CM

## 2018-12-07 NOTE — PROGRESS NOTES
Ms. Valle comes in for a prenatal visit with baby' father Bartolo.  Baby is due in January.  The pregnancy is going well.  We discussed the hospital rounding system, the clinic, and I answered their questions.    A prenatal packet was given, and I invited them to call me if they have any other questions.

## 2018-12-07 NOTE — MR AVS SNAPSHOT
After Visit Summary   12/7/2018    Vianca Valle    MRN: [id number]           Patient Information     Date Of Birth          1985        Visit Information        Provider Department      12/7/2018 12:40 PM Heidi Alcala MD [facility]        Today's Diagnoses     Pre-birth visit for expectant mother    -  1       Follow-ups after your visit        Your next 10 appointments already scheduled     Dec 19, 2018  8:15 AM CST   RETURN OB with DUSTIN Kirby   [facility] ([facility])    [facility] [address]  [address]  [facility] [id number] [phone]            Dec 28, 2018  8:15 AM CST   RETURN OB with AMANDA Cotton [facility] ([facility])    [facility] [address]  [address]  [facility] [id number] [phone]            Jan 02, 2019  8:15 AM CST   RETURN OB with AMANDA Crowley [facility]   [facility] ([facility])    [facility] [address]  [address]  [facility] [id number] [phone]            Jan 07, 2019  8:15 AM CST   RETURN OB with DUSTIN Kirby   [facility] ([facility])    [facility] [address]  [address]  [facility] [id number] [phone]              Who to contact     If you have questions or need follow up information about [facility]'s clinic visit or your schedule please contact [facility] directly at [phone].  Normal or non-critical lab and imaging results will be communicated to you by MyChart, letter or phone within 4 business days after the clinic has received the results. If you do not hear from us within 7 days, please contact the clinic through MyChart or phone. If you have a  critical or abnormal lab result, we will notify you by phone as soon as possible.  Submit refill requests through [employer] or call your pharmacy and they will forward the refill request to us. Please allow 3 business days for your refill to be completed.          Additional Information About Your Visit        MyChart Information     [employer] gives you secure access to your electronic health record. If you see a primary care provider, you can also send messages to your care team and make appointments. If you have questions, please call your primary care clinic.  If you do not have a primary care provider, please call [phone] and they will assist you.        Care EveryWhere ID     This is your Care EveryWhere ID. This could be used by other organizations to access your [employer] medical records  [id number]        Your Vitals Were     Last Period                   04/08/2018 (Exact Date)            Blood Pressure from Last 3 Encounters:   12/06/18 117/80   11/21/18 111/78   11/08/18 117/81    Weight from Last 3 Encounters:   12/06/18 183 lb 8 oz (83.2 kg)   11/21/18 181 lb 9.6 oz (82.4 kg)   11/08/18 181 lb (82.1 kg)              Today, you had the following     No orders found for display       Primary Care Provider    None Specified       No primary provider on file.        Equal Access to Services     SHELLEY SAVAGE : Hadii filomena cleaningo Soeulalio, waaxda luqadaha, qaybta kaalmada ademosesyada, zainab bains . So [facility] [phone].    ATENCIÓN: Si habla español, tiene a car disposición servicios gratuitos de asistencia lingüística. Llame al [phone].    We comply with applicable federal civil rights laws and [place] laws. We do not discriminate on the basis of race, color, national origin, age, disability, sex, sexual orientation, or gender identity.            Thank you!     Thank you for choosing [facility]  for your care. Our goal is always to provide you  with excellent care. Hearing back from our patients is one way we can continue to improve our services. Please take a few minutes to complete the written survey that you may receive in the mail after your visit with us. Thank you!             Your Updated Medication List - Protect others around you: Learn how to safely use, store and throw away your medicines at www.disposemymeds.org.          This list is accurate as of 12/7/18  2:13 PM.  Always use your most recent med list.                   Brand Name Dispense Instructions for use Diagnosis    FISH OIL PO      Take by mouth daily        HERBALS           PRENATAL PO      Take by mouth daily        probiotic Caps           VITAMIN D (CHOLECALCIFEROL) PO      Take by mouth daily

## 2018-12-19 ENCOUNTER — OFFICE VISIT (OUTPATIENT)
Dept: OBGYN | Facility: CLINIC | Age: 33
End: 2018-12-19
Attending: ADVANCED PRACTICE MIDWIFE
Payer: COMMERCIAL

## 2018-12-19 VITALS
BODY MASS INDEX: 33.05 KG/M2 | WEIGHT: 186.6 LBS | DIASTOLIC BLOOD PRESSURE: 86 MMHG | SYSTOLIC BLOOD PRESSURE: 124 MMHG | HEART RATE: 111 BPM

## 2018-12-19 DIAGNOSIS — Z34.03 ENCOUNTER FOR SUPERVISION OF NORMAL FIRST PREGNANCY IN THIRD TRIMESTER: Primary | ICD-10-CM

## 2018-12-19 LAB
ERYTHROCYTE [DISTWIDTH] IN BLOOD BY AUTOMATED COUNT: 13.2 % (ref 10–15)
HCT VFR BLD AUTO: 36.8 % (ref 35–47)
HGB BLD-MCNC: 12 G/DL (ref 11.7–15.7)
MCH RBC QN AUTO: 29.1 PG (ref 26.5–33)
MCHC RBC AUTO-ENTMCNC: 32.6 G/DL (ref 31.5–36.5)
MCV RBC AUTO: 89 FL (ref 78–100)
PLATELET # BLD AUTO: 202 10E9/L (ref 150–450)
RBC # BLD AUTO: 4.12 10E12/L (ref 3.8–5.2)
WBC # BLD AUTO: 14 10E9/L (ref 4–11)

## 2018-12-19 PROCEDURE — 85027 COMPLETE CBC AUTOMATED: CPT | Performed by: ADVANCED PRACTICE MIDWIFE

## 2018-12-19 PROCEDURE — 36415 COLL VENOUS BLD VENIPUNCTURE: CPT | Performed by: ADVANCED PRACTICE MIDWIFE

## 2018-12-19 PROCEDURE — G0463 HOSPITAL OUTPT CLINIC VISIT: HCPCS | Mod: ZF

## 2018-12-19 PROCEDURE — 87653 STREP B DNA AMP PROBE: CPT | Performed by: ADVANCED PRACTICE MIDWIFE

## 2018-12-19 ASSESSMENT — ANXIETY QUESTIONNAIRES
5. BEING SO RESTLESS THAT IT IS HARD TO SIT STILL: NOT AT ALL
6. BECOMING EASILY ANNOYED OR IRRITABLE: SEVERAL DAYS
1. FEELING NERVOUS, ANXIOUS, OR ON EDGE: NOT AT ALL
GAD7 TOTAL SCORE: 1
3. WORRYING TOO MUCH ABOUT DIFFERENT THINGS: NOT AT ALL
7. FEELING AFRAID AS IF SOMETHING AWFUL MIGHT HAPPEN: NOT AT ALL
2. NOT BEING ABLE TO STOP OR CONTROL WORRYING: NOT AT ALL

## 2018-12-19 ASSESSMENT — PAIN SCALES - GENERAL: PAINLEVEL: NO PAIN (0)

## 2018-12-19 ASSESSMENT — PATIENT HEALTH QUESTIONNAIRE - PHQ9
5. POOR APPETITE OR OVEREATING: NOT AT ALL
SUM OF ALL RESPONSES TO PHQ QUESTIONS 1-9: 6

## 2018-12-19 NOTE — NURSING NOTE
Chief Complaint   Patient presents with     Prenatal Care     36 weeks 3 days     Health Maintenance Due   Topic Date Due     REPEAT ANTIBODY SCREEN (OB)  10/21/2018     RH IMMUNE GLOBULIN (OB)  10/21/2018     GROUP B STREP SCREENING  12/16/2018     Olivia Sargent CMA on 12/19/2018 at 8:24 AM

## 2018-12-19 NOTE — PROGRESS NOTES
Subjective:      33 year old  at 36w3d presents for a routine prenatal appointment.    Has felt some irregular BH. Denies regular contractions, vaginal bleeding, discharge or leakage of fluid. Reports +fetal movement.  No HA, vision changes, ruq/epigastric pain.      Patient concerns: Feeling well. Had pre-visit with pediatrician. Going to [place] for the holidays.    Objective:  Vitals:    18 0824   BP: 124/86   BP Location: Left arm   Patient Position: Sitting   Cuff Size: Adult Regular   Pulse: 111   Weight: 84.6 kg (186 lb 9.6 oz)      See OB flowsheet    PHQ9= 6, GAD7= 1    Assessment/Plan     Encounter Diagnosis   Name Primary?     Encounter for supervision of normal first pregnancy in third trimester Yes     Orders Placed This Encounter   Procedures     CBC with Platelets     Labor signs discussed. Reinforced daily fetal movement counts.  Reviewed why/how to contact provider if headache/visual changes/RUQ or epigastric pain, decreased fetal movement, vaginal bleeding, leakage of fluid.     GBS collected.  CBC with plts ordered.     Continue scheduled prenatal care, RTC in  1 week and prn if questions or concerns.     AMANDA Landa, CNM

## 2018-12-19 NOTE — LETTER
2018       RE: Vianca Valle  [address]  [place] [address]     Dear Colleague,    Thank you for referring your patient, Vianca Valle, to the [facility] at [facility]. Please see a copy of my visit note below.    Subjective:      33 year old  at 36w3d presents for a routine prenatal appointment.    Has felt some irregular BH. Denies regular contractions, vaginal bleeding, discharge or leakage of fluid. Reports +fetal movement.  No HA, vision changes, ruq/epigastric pain.      Patient concerns: Feeling well. Had pre-visit with pediatrician. Going to [place] for the holidays.    Objective:  Vitals:    18 0824   BP: 124/86   BP Location: Left arm   Patient Position: Sitting   Cuff Size: Adult Regular   Pulse: 111   Weight: 84.6 kg (186 lb 9.6 oz)      See OB flowsheet    PHQ9= 6, GAD7= 1    Assessment/Plan     Encounter Diagnosis   Name Primary?     Encounter for supervision of normal first pregnancy in third trimester Yes     Orders Placed This Encounter   Procedures     CBC with Platelets     Labor signs discussed. Reinforced daily fetal movement counts.  Reviewed why/how to contact provider if headache/visual changes/RUQ or epigastric pain, decreased fetal movement, vaginal bleeding, leakage of fluid.     GBS collected.  CBC with plts ordered.     Continue scheduled prenatal care, RTC in  1 week and prn if questions or concerns.     AMANDA Landa, MELITA

## 2018-12-20 LAB
GP B STREP DNA SPEC QL NAA+PROBE: NEGATIVE
SPECIMEN SOURCE: NORMAL

## 2018-12-20 ASSESSMENT — ANXIETY QUESTIONNAIRES: GAD7 TOTAL SCORE: 1

## 2018-12-28 ENCOUNTER — OFFICE VISIT (OUTPATIENT)
Dept: OBGYN | Facility: CLINIC | Age: 33
End: 2018-12-28
Attending: MIDWIFE
Payer: COMMERCIAL

## 2018-12-28 VITALS
BODY MASS INDEX: 33.41 KG/M2 | HEART RATE: 111 BPM | WEIGHT: 188.6 LBS | DIASTOLIC BLOOD PRESSURE: 83 MMHG | SYSTOLIC BLOOD PRESSURE: 119 MMHG

## 2018-12-28 DIAGNOSIS — Z34.03 ENCOUNTER FOR SUPERVISION OF NORMAL FIRST PREGNANCY IN THIRD TRIMESTER: Primary | ICD-10-CM

## 2018-12-28 PROCEDURE — G0463 HOSPITAL OUTPT CLINIC VISIT: HCPCS | Mod: ZF

## 2018-12-28 ASSESSMENT — PAIN SCALES - GENERAL: PAINLEVEL: NO PAIN (0)

## 2018-12-28 NOTE — NURSING NOTE
Chief Complaint   Patient presents with     Prenatal Care     37 weeks 5 days      Health Maintenance Due   Topic Date Due     REPEAT ANTIBODY SCREEN (OB)  10/21/2018     RH IMMUNE GLOBULIN (OB)  10/21/2018     Olivia Sargent CMA on 12/28/2018 at 8:16 AM

## 2018-12-28 NOTE — LETTER
2018       RE: Vianca Valle  [address]  [place] [address]     Dear Colleague,    Thank you for referring your patient, Vianca Valle, to the [facility] at [facility]. Please see a copy of my visit note below.    Subjective:      33 year old  at 37w5d presents for a routine prenatal appointment.         no vaginal bleeding,  leakage of fluid, or change in vaginal discharge.  Having  contractions.  Good  fetal movement.     No HA, visual changes, RUQ or epigastric pain.   Patient concerns: desires cervix check today  FMLA papers breast pump RX   Feeling well overall.   Objective:  Vitals:    18 0817   BP: 119/83   BP Location: Left arm   Patient Position: Sitting   Cuff Size: Adult Regular   Pulse: 111   Weight: 85.5 kg (188 lb 9.6 oz)    See OB flowsheet    Assessment/Plan     Encounter Diagnosis   Name Primary?     Encounter for supervision of normal first pregnancy in third trimester Yes     No orders of the defined types were placed in this encounter.    No orders of the defined types were placed in this encounter.      PHQ-9 SCORE 2017 10/18/2018 2018   PHQ-9 Total Score 1 3 6       Labor signs discussed. Reinforced daily fetal movement counts.  Reviewed why/how to contact provider if headache/visual changes/RUQ or epigastric pain, decreased fetal movement, vaginal bleeding, leakage of fluid.   Return to clinic in 1 week and prn if questions or concerns.     AMANDA Cotton CNM

## 2018-12-28 NOTE — PROGRESS NOTES
Subjective:      33 year old  at 37w5d presents for a routine prenatal appointment.         no vaginal bleeding,  leakage of fluid, or change in vaginal discharge.  Having  contractions.  Good  fetal movement.     No HA, visual changes, RUQ or epigastric pain.   Patient concerns: desires cervix check today  FMLA papers breast pump RX   Feeling well overall.   Objective:  Vitals:    18 0817   BP: 119/83   BP Location: Left arm   Patient Position: Sitting   Cuff Size: Adult Regular   Pulse: 111   Weight: 85.5 kg (188 lb 9.6 oz)    See OB flowsheet    Assessment/Plan     Encounter Diagnosis   Name Primary?     Encounter for supervision of normal first pregnancy in third trimester Yes     No orders of the defined types were placed in this encounter.    No orders of the defined types were placed in this encounter.      PHQ-9 SCORE 2017 10/18/2018 2018   PHQ-9 Total Score 1 3 6       Labor signs discussed. Reinforced daily fetal movement counts.  Reviewed why/how to contact provider if headache/visual changes/RUQ or epigastric pain, decreased fetal movement, vaginal bleeding, leakage of fluid.   Return to clinic in 1 week and prn if questions or concerns.     AMANDA Cotton CNM

## 2019-01-01 NOTE — PROGRESS NOTES
Subjective:     33 year old  at 38w2d presents for routine prenatal visit.            No vaginal bleeding or leakage of fluid.  BH contractions.  Normal daily fetal movement.        No HA, visual changes, RUQ or epigastric pain.     Patient concerns: Feeling well overall. Ready for baby. Having occasional pubic symphysis discomfort that is manageable. Completed childbirth class at [facility], signed up for new \[facility]\"" group.     Objective:  Vitals:    19 0823   BP: 125/84   BP Location: Right arm   Patient Position: Sitting   Cuff Size: Adult Regular   Pulse: 93   Weight: 85 kg (187 lb 6.4 oz)    See OB flowsheet    Assessment/Plan     Encounter Diagnosis   Name Primary?     Encounter for supervision of normal first pregnancy in third trimester Yes     - Reviewed why/how to contact provider if headache/visual changes/RUQ or epigastric pain, decreased fetal movement, vaginal bleeding, leakage of fluid or strong/regular contractions.   Patient education/orders or handouts today: reviewed when to present to hospital  Return to clinic in 1 week and prn if questions or concerns.     AMANDA Velez CNM

## 2019-01-02 ENCOUNTER — OFFICE VISIT (OUTPATIENT)
Dept: OBGYN | Facility: CLINIC | Age: 34
End: 2019-01-02
Attending: ADVANCED PRACTICE MIDWIFE
Payer: COMMERCIAL

## 2019-01-02 VITALS
BODY MASS INDEX: 33.2 KG/M2 | WEIGHT: 187.4 LBS | HEART RATE: 93 BPM | SYSTOLIC BLOOD PRESSURE: 125 MMHG | DIASTOLIC BLOOD PRESSURE: 84 MMHG

## 2019-01-02 DIAGNOSIS — Z34.03 ENCOUNTER FOR SUPERVISION OF NORMAL FIRST PREGNANCY IN THIRD TRIMESTER: Primary | ICD-10-CM

## 2019-01-02 PROCEDURE — G0463 HOSPITAL OUTPT CLINIC VISIT: HCPCS | Mod: ZF

## 2019-01-02 ASSESSMENT — PAIN SCALES - GENERAL: PAINLEVEL: NO PAIN (0)

## 2019-01-02 NOTE — NURSING NOTE
Chief Complaint   Patient presents with     Prenatal Care     38 weeks 3 days     There are no preventive care reminders to display for this patient.  Olivia Sargent CMA on 1/2/2019 at 8:23 AM

## 2019-01-02 NOTE — LETTER
2019       RE: Vianca Valle  [address]  [place] [address]     Dear Colleague,    Thank you for referring your patient, Vianca Valle, to the [facility] at [facility]. Please see a copy of my visit note below.    Subjective:     33 year old  at 38w2d presents for routine prenatal visit.            No vaginal bleeding or leakage of fluid.  BH contractions.  Normal daily fetal movement.        No HA, visual changes, RUQ or epigastric pain.     Patient concerns: Feeling well overall. Ready for baby. Having occasional pubic symphysis discomfort that is manageable. Completed childbirth class at [place], [facility].     Objective:  Vitals:    19 0823   BP: 125/84   BP Location: Right arm   Patient Position: Sitting   Cuff Size: Adult Regular   Pulse: 93   Weight: 85 kg (187 lb 6.4 oz)    See OB flowsheet    Assessment/Plan     Encounter Diagnosis   Name Primary?     Encounter for supervision of normal first pregnancy in third trimester Yes     - Reviewed why/how to contact provider if headache/visual changes/RUQ or epigastric pain, decreased fetal movement, vaginal bleeding, leakage of fluid or strong/regular contractions.   Patient education/orders or handouts today: reviewed when to present to hospital  Return to clinic in 1 week and prn if questions or concerns.     AMANDA Velez CNM

## 2019-01-07 ENCOUNTER — OFFICE VISIT (OUTPATIENT)
Dept: OBGYN | Facility: CLINIC | Age: 34
End: 2019-01-07
Attending: ADVANCED PRACTICE MIDWIFE
Payer: COMMERCIAL

## 2019-01-07 VITALS
DIASTOLIC BLOOD PRESSURE: 79 MMHG | SYSTOLIC BLOOD PRESSURE: 112 MMHG | HEART RATE: 99 BPM | BODY MASS INDEX: 33.05 KG/M2 | WEIGHT: 186.6 LBS

## 2019-01-07 DIAGNOSIS — Z34.03 ENCOUNTER FOR SUPERVISION OF NORMAL FIRST PREGNANCY IN THIRD TRIMESTER: Primary | ICD-10-CM

## 2019-01-07 PROBLEM — Z34.00 SUPERVISION OF NORMAL FIRST PREGNANCY: Status: ACTIVE | Noted: 2018-06-05

## 2019-01-07 PROCEDURE — G0463 HOSPITAL OUTPT CLINIC VISIT: HCPCS | Mod: ZF

## 2019-01-07 ASSESSMENT — PAIN SCALES - GENERAL: PAINLEVEL: NO PAIN (0)

## 2019-01-07 NOTE — PROGRESS NOTES
Subjective:     33 year old  at 39w1d presents for routine prenatal visit.           Patient concerns: Feeling well overall. Reports mild irregular cramping- more at night. Has mucous discharge last Wednesday/Thursday. Desires SVE. Hoping for spontaneous labor.  Denies regular contractions, vaginal bleeding or leakage of fluid. Reports +fetal movement.  No HA, vision changes, ruq/epigastric pain.      Objective:  Vitals:    19 0819   BP: 112/79   BP Location: Left arm   Patient Position: Sitting   Cuff Size: Adult Large   Pulse: 99   Weight: 84.6 kg (186 lb 9.6 oz)      See OB flowsheet    SVE: /-1, mid/soft, cephalic  Cervix positioned more to maternal right    Assessment/Plan     Encounter Diagnosis   Name Primary?     Encounter for supervision of normal first pregnancy in third trimester Yes     Orders Placed This Encounter   Procedures     US OB Fetal Biophys Prf wo NonStrs Singls Sgl     - Reviewed why/how to contact provider if headache/visual changes/RUQ or epigastric pain, decreased fetal movement, vaginal bleeding, leakage of fluid or strong/regular contractions.   Patient education/orders or handouts today:  Sign/symptoms of labor, When to call for labor or other concerns, Postdates testing discussion, BPP, NST and Induction of labor    - Reviewed late term testing including BPP => 41 weeks and rationale for induction of labor based on results.   Patient desires late term testing.     Plan BPP and WADE on 19 @ 41+1.    Continue scheduled prenatal care, RTC in 1 week for WADE and in 2 weeks for BPP and WADE and prn if questions or concerns.      AMANDA Landa, MELITA

## 2019-01-07 NOTE — NURSING NOTE
Chief Complaint   Patient presents with     Prenatal Care     39 weeks 1 day      cervix check     Olivia Sargent CMA on 1/7/2019 at 8:18 AM

## 2019-01-07 NOTE — LETTER
2019       RE: Vianca Valle  [address]  [place] [address]     Dear Colleague,    Thank you for referring your patient, Vianca Valle, to the [facility] at [facility]. Please see a copy of my visit note below.    Subjective:     33 year old  at 39w1d presents for routine prenatal visit.           Patient concerns: Feeling well overall. Reports mild irregular cramping- more at night. Has mucous discharge last Wednesday/Thursday. Desires SVE. Hoping for spontaneous labor.  Denies regular contractions, vaginal bleeding or leakage of fluid. Reports +fetal movement.  No HA, vision changes, ruq/epigastric pain.      Objective:  Vitals:    19 0819   BP: 112/79   BP Location: Left arm   Patient Position: Sitting   Cuff Size: Adult Large   Pulse: 99   Weight: 84.6 kg (186 lb 9.6 oz)      See OB flowsheet    SVE: /-1, mid/soft, cephalic  Cervix positioned more to maternal right    Assessment/Plan     Encounter Diagnosis   Name Primary?     Encounter for supervision of normal first pregnancy in third trimester Yes     Orders Placed This Encounter   Procedures     US OB Fetal Biophys Prf wo NonStrs Singls Sgl     - Reviewed why/how to contact provider if headache/visual changes/RUQ or epigastric pain, decreased fetal movement, vaginal bleeding, leakage of fluid or strong/regular contractions.   Patient education/orders or handouts today:  Sign/symptoms of labor, When to call for labor or other concerns, Postdates testing discussion, BPP, NST and Induction of labor    - Reviewed late term testing including BPP => 41 weeks and rationale for induction of labor based on results.   Patient desires late term testing.     Plan BPP and WADE on 19 @ 41+1.    Continue scheduled prenatal care, RTC in 1 week for WADE and in 2 weeks for BPP and WADE and prn if questions or concerns.      AMANDA Landa, MELITA

## 2019-01-14 ENCOUNTER — OFFICE VISIT (OUTPATIENT)
Dept: OBGYN | Facility: CLINIC | Age: 34
End: 2019-01-14
Attending: ADVANCED PRACTICE MIDWIFE
Payer: COMMERCIAL

## 2019-01-14 VITALS
WEIGHT: 187.4 LBS | SYSTOLIC BLOOD PRESSURE: 125 MMHG | DIASTOLIC BLOOD PRESSURE: 80 MMHG | HEART RATE: 102 BPM | BODY MASS INDEX: 33.2 KG/M2

## 2019-01-14 DIAGNOSIS — Z34.03 ENCOUNTER FOR SUPERVISION OF NORMAL FIRST PREGNANCY IN THIRD TRIMESTER: Primary | ICD-10-CM

## 2019-01-14 PROCEDURE — G0463 HOSPITAL OUTPT CLINIC VISIT: HCPCS | Mod: ZF

## 2019-01-14 ASSESSMENT — PAIN SCALES - GENERAL: PAINLEVEL: NO PAIN (0)

## 2019-01-14 NOTE — LETTER
2019       RE: Vianca Valle  [address]  [place] [address]     Dear Colleague,    Thank you for referring your patient, Vianca Valle, to the [facility] at [facility]. Please see a copy of my visit note below.    Subjective:     33 year old  at 40w1d presents for routine prenatal visit.          no vaginal bleeding or leakage of fluid.  More  contractions.  Good  fetal movement.        No HA, visual changes, RUQ or epigastric pain.   Patient concerns:  Feeling well overall. Having more contractions hoping baby comes soon is working still   Spouse works in [employer] they live in [place].  Discussed plan for BPP and appt is already scheduled next week   cx soft, mp, EO 2 IO 1+ 75% eff. Vtx well applied -2 membranes stripped per pt request    Objective:  Vitals:    19 0915   BP: 125/80   BP Location: Left arm   Patient Position: Sitting   Cuff Size: Adult Regular   Pulse: 102   Weight: 85 kg (187 lb 6.4 oz)    See OB flowsheet  Assessment/Plan     Encounter Diagnosis   Name Primary?     Encounter for supervision of normal first pregnancy in third trimester Yes     Orders Placed This Encounter   Procedures     US OB Fetal Biophys Prf wo NonStrs Singls Sgl     No orders of the defined types were placed in this encounter.    - Reviewed postdates testing including BPP => 41 weeks and rationale for induction of labor based on results.   Patient desires  postdates testing.  - Reviewed why/how to contact provider if headache/visual changes/RUQ or epigastric pain, decreased fetal movement, vaginal bleeding, leakage of fluid or strong/regular contractions.   Patient education/orders or handouts today:  Sign/symptoms of labor and When to call for labor or other concerns  Return to clinic in 1 week and prn if questions or concerns.   AMANDA Cotton CNM

## 2019-01-14 NOTE — NURSING NOTE
Chief Complaint   Patient presents with     Prenatal Care     40 weeks 1 day     Health Maintenance Due   Topic Date Due     REPEAT ANTIBODY SCREEN (OB)  10/21/2018     RH IMMUNE GLOBULIN (OB)  10/21/2018     Olivia Sargent CMA on 1/14/2019 at 9:15 AM

## 2019-01-14 NOTE — PROGRESS NOTES
Subjective:     33 year old  at 40w1d presents for routine prenatal visit.          no vaginal bleeding or leakage of fluid.  More  contractions.  Good  fetal movement.        No HA, visual changes, RUQ or epigastric pain.   Patient concerns:  Feeling well overall. Having more contractions hoping baby comes soon is working still   Spouse works in [place] they live in [place].  Discussed plan for BPP and appt is already scheduled next week   cx soft, mp, EO 2 IO 1+ 75% eff. Vtx well applied -2 membranes stripped per pt request    Objective:  Vitals:    19 0915   BP: 125/80   BP Location: Left arm   Patient Position: Sitting   Cuff Size: Adult Regular   Pulse: 102   Weight: 85 kg (187 lb 6.4 oz)    See OB flowsheet  Assessment/Plan     Encounter Diagnosis   Name Primary?     Encounter for supervision of normal first pregnancy in third trimester Yes     Orders Placed This Encounter   Procedures     US OB Fetal Biophys Prf wo NonStrs Singls Sgl     No orders of the defined types were placed in this encounter.    - Reviewed postdates testing including BPP => 41 weeks and rationale for induction of labor based on results.   Patient desires  postdates testing.  - Reviewed why/how to contact provider if headache/visual changes/RUQ or epigastric pain, decreased fetal movement, vaginal bleeding, leakage of fluid or strong/regular contractions.   Patient education/orders or handouts today:  Sign/symptoms of labor and When to call for labor or other concerns  Return to clinic in 1 week and prn if questions or concerns.   AMANDA Cotton CNM

## 2019-01-19 ENCOUNTER — TELEPHONE (OUTPATIENT)
Dept: OBGYN | Facility: CLINIC | Age: 34
End: 2019-01-19

## 2019-01-20 ENCOUNTER — ANESTHESIA EVENT (OUTPATIENT)
Dept: OBGYN | Facility: CLINIC | Age: 34
End: 2019-01-20
Payer: COMMERCIAL

## 2019-01-20 ENCOUNTER — HOSPITAL ENCOUNTER (INPATIENT)
Facility: CLINIC | Age: 34
LOS: 3 days | Discharge: HOME HEALTH | End: 2019-01-23
Attending: ADVANCED PRACTICE MIDWIFE | Admitting: ADVANCED PRACTICE MIDWIFE
Payer: COMMERCIAL

## 2019-01-20 ENCOUNTER — ANESTHESIA (OUTPATIENT)
Dept: OBGYN | Facility: CLINIC | Age: 34
End: 2019-01-20
Payer: COMMERCIAL

## 2019-01-20 DIAGNOSIS — Z98.891 S/P CESAREAN SECTION: Primary | ICD-10-CM

## 2019-01-20 LAB
ABO + RH BLD: NORMAL
BASOPHILS # BLD AUTO: 0 10E9/L (ref 0–0.2)
BASOPHILS NFR BLD AUTO: 0.1 %
BLD GP AB SCN SERPL QL: NORMAL
BLOOD BANK CMNT PATIENT-IMP: NORMAL
DIFFERENTIAL METHOD BLD: ABNORMAL
EOSINOPHIL # BLD AUTO: 0.1 10E9/L (ref 0–0.7)
EOSINOPHIL NFR BLD AUTO: 0.6 %
ERYTHROCYTE [DISTWIDTH] IN BLOOD BY AUTOMATED COUNT: 13.8 % (ref 10–15)
HCT VFR BLD AUTO: 40.6 % (ref 35–47)
HGB BLD-MCNC: 13.6 G/DL (ref 11.7–15.7)
IMM GRANULOCYTES # BLD: 0.1 10E9/L (ref 0–0.4)
IMM GRANULOCYTES NFR BLD: 0.6 %
LYMPHOCYTES # BLD AUTO: 2.9 10E9/L (ref 0.8–5.3)
LYMPHOCYTES NFR BLD AUTO: 18.2 %
MCH RBC QN AUTO: 30 PG (ref 26.5–33)
MCHC RBC AUTO-ENTMCNC: 33.5 G/DL (ref 31.5–36.5)
MCV RBC AUTO: 89 FL (ref 78–100)
MONOCYTES # BLD AUTO: 0.9 10E9/L (ref 0–1.3)
MONOCYTES NFR BLD AUTO: 5.5 %
NEUTROPHILS # BLD AUTO: 12 10E9/L (ref 1.6–8.3)
NEUTROPHILS NFR BLD AUTO: 75 %
NRBC # BLD AUTO: 0 10*3/UL
NRBC BLD AUTO-RTO: 0 /100
PLATELET # BLD AUTO: 184 10E9/L (ref 150–450)
RBC # BLD AUTO: 4.54 10E12/L (ref 3.8–5.2)
SPECIMEN EXP DATE BLD: NORMAL
SPECIMEN EXP DATE BLD: NORMAL
T PALLIDUM AB SER QL: NONREACTIVE
WBC # BLD AUTO: 16.1 10E9/L (ref 4–11)

## 2019-01-20 PROCEDURE — 40000275 ZZH STATISTIC RCP TIME EA 10 MIN

## 2019-01-20 PROCEDURE — 25000132 ZZH RX MED GY IP 250 OP 250 PS 637: Performed by: STUDENT IN AN ORGANIZED HEALTH CARE EDUCATION/TRAINING PROGRAM

## 2019-01-20 PROCEDURE — 25000128 H RX IP 250 OP 636: Performed by: ANESTHESIOLOGY

## 2019-01-20 PROCEDURE — 12000001 ZZH R&B MED SURG/OB UMMC

## 2019-01-20 PROCEDURE — 25000128 H RX IP 250 OP 636: Performed by: ADVANCED PRACTICE MIDWIFE

## 2019-01-20 PROCEDURE — 86850 RBC ANTIBODY SCREEN: CPT | Performed by: ADVANCED PRACTICE MIDWIFE

## 2019-01-20 PROCEDURE — 86901 BLOOD TYPING SEROLOGIC RH(D): CPT | Performed by: ADVANCED PRACTICE MIDWIFE

## 2019-01-20 PROCEDURE — 36000059 ZZH SURGERY LEVEL 3 EA 15 ADDTL MIN UMMC: Performed by: OBSTETRICS & GYNECOLOGY

## 2019-01-20 PROCEDURE — 37000008 ZZH ANESTHESIA TECHNICAL FEE, 1ST 30 MIN: Performed by: OBSTETRICS & GYNECOLOGY

## 2019-01-20 PROCEDURE — 37000009 ZZH ANESTHESIA TECHNICAL FEE, EACH ADDTL 15 MIN: Performed by: OBSTETRICS & GYNECOLOGY

## 2019-01-20 PROCEDURE — G0463 HOSPITAL OUTPT CLINIC VISIT: HCPCS

## 2019-01-20 PROCEDURE — 25000125 ZZHC RX 250

## 2019-01-20 PROCEDURE — 00HU33Z INSERTION OF INFUSION DEVICE INTO SPINAL CANAL, PERCUTANEOUS APPROACH: ICD-10-PCS | Performed by: OBSTETRICS & GYNECOLOGY

## 2019-01-20 PROCEDURE — 71000014 ZZH RECOVERY PHASE 1 LEVEL 2 FIRST HR: Performed by: OBSTETRICS & GYNECOLOGY

## 2019-01-20 PROCEDURE — 86900 BLOOD TYPING SEROLOGIC ABO: CPT | Performed by: ADVANCED PRACTICE MIDWIFE

## 2019-01-20 PROCEDURE — 40000170 ZZH STATISTIC PRE-PROCEDURE ASSESSMENT II: Performed by: OBSTETRICS & GYNECOLOGY

## 2019-01-20 PROCEDURE — 25000128 H RX IP 250 OP 636: Performed by: NURSE ANESTHETIST, CERTIFIED REGISTERED

## 2019-01-20 PROCEDURE — 25000128 H RX IP 250 OP 636: Performed by: STUDENT IN AN ORGANIZED HEALTH CARE EDUCATION/TRAINING PROGRAM

## 2019-01-20 PROCEDURE — 36000057 ZZH SURGERY LEVEL 3 1ST 30 MIN - UMMC: Performed by: OBSTETRICS & GYNECOLOGY

## 2019-01-20 PROCEDURE — C9290 INJ, BUPIVACAINE LIPOSOME: HCPCS | Performed by: ANESTHESIOLOGY

## 2019-01-20 PROCEDURE — 36415 COLL VENOUS BLD VENIPUNCTURE: CPT | Performed by: ADVANCED PRACTICE MIDWIFE

## 2019-01-20 PROCEDURE — 27210794 ZZH OR GENERAL SUPPLY STERILE: Performed by: OBSTETRICS & GYNECOLOGY

## 2019-01-20 PROCEDURE — 71000015 ZZH RECOVERY PHASE 1 LEVEL 2 EA ADDTL HR: Performed by: OBSTETRICS & GYNECOLOGY

## 2019-01-20 PROCEDURE — 85025 COMPLETE CBC W/AUTO DIFF WBC: CPT | Performed by: ADVANCED PRACTICE MIDWIFE

## 2019-01-20 PROCEDURE — 25000125 ZZHC RX 250: Performed by: ANESTHESIOLOGY

## 2019-01-20 PROCEDURE — 27110028 ZZH OR GENERAL SUPPLY NON-STERILE: Performed by: OBSTETRICS & GYNECOLOGY

## 2019-01-20 PROCEDURE — 25000128 H RX IP 250 OP 636

## 2019-01-20 PROCEDURE — 25000125 ZZHC RX 250: Performed by: NURSE ANESTHETIST, CERTIFIED REGISTERED

## 2019-01-20 PROCEDURE — 40000977 ZZH STATISTIC ATTENDANCE AT DELIVERY

## 2019-01-20 PROCEDURE — 3E0R3BZ INTRODUCTION OF ANESTHETIC AGENT INTO SPINAL CANAL, PERCUTANEOUS APPROACH: ICD-10-PCS | Performed by: OBSTETRICS & GYNECOLOGY

## 2019-01-20 PROCEDURE — 86780 TREPONEMA PALLIDUM: CPT | Performed by: ADVANCED PRACTICE MIDWIFE

## 2019-01-20 RX ORDER — OXYTOCIN/0.9 % SODIUM CHLORIDE 30/500 ML
1-24 PLASTIC BAG, INJECTION (ML) INTRAVENOUS CONTINUOUS
Status: DISCONTINUED | OUTPATIENT
Start: 2019-01-20 | End: 2019-01-21

## 2019-01-20 RX ORDER — KETOROLAC TROMETHAMINE 30 MG/ML
INJECTION, SOLUTION INTRAMUSCULAR; INTRAVENOUS PRN
Status: DISCONTINUED | OUTPATIENT
Start: 2019-01-20 | End: 2019-01-20

## 2019-01-20 RX ORDER — NALOXONE HYDROCHLORIDE 0.4 MG/ML
.1-.4 INJECTION, SOLUTION INTRAMUSCULAR; INTRAVENOUS; SUBCUTANEOUS
Status: DISCONTINUED | OUTPATIENT
Start: 2019-01-20 | End: 2019-01-20

## 2019-01-20 RX ORDER — IBUPROFEN 800 MG/1
800 TABLET, FILM COATED ORAL
Status: DISCONTINUED | OUTPATIENT
Start: 2019-01-20 | End: 2019-01-21

## 2019-01-20 RX ORDER — OXYTOCIN 10 [USP'U]/ML
INJECTION, SOLUTION INTRAMUSCULAR; INTRAVENOUS
Status: DISCONTINUED
Start: 2019-01-20 | End: 2019-01-20 | Stop reason: HOSPADM

## 2019-01-20 RX ORDER — LIDOCAINE 40 MG/G
CREAM TOPICAL
Status: DISCONTINUED | OUTPATIENT
Start: 2019-01-20 | End: 2019-01-21

## 2019-01-20 RX ORDER — CITRIC ACID/SODIUM CITRATE 334-500MG
30 SOLUTION, ORAL ORAL
Status: COMPLETED | OUTPATIENT
Start: 2019-01-20 | End: 2019-01-20

## 2019-01-20 RX ORDER — ACETAMINOPHEN 325 MG/1
650 TABLET ORAL EVERY 4 HOURS PRN
Status: DISCONTINUED | OUTPATIENT
Start: 2019-01-20 | End: 2019-01-21

## 2019-01-20 RX ORDER — CEFAZOLIN SODIUM 1 G/3ML
1 INJECTION, POWDER, FOR SOLUTION INTRAMUSCULAR; INTRAVENOUS SEE ADMIN INSTRUCTIONS
Status: DISCONTINUED | OUTPATIENT
Start: 2019-01-20 | End: 2019-01-21 | Stop reason: HOSPADM

## 2019-01-20 RX ORDER — SODIUM CHLORIDE, SODIUM LACTATE, POTASSIUM CHLORIDE, CALCIUM CHLORIDE 600; 310; 30; 20 MG/100ML; MG/100ML; MG/100ML; MG/100ML
INJECTION, SOLUTION INTRAVENOUS CONTINUOUS
Status: DISCONTINUED | OUTPATIENT
Start: 2019-01-21 | End: 2019-01-21 | Stop reason: HOSPADM

## 2019-01-20 RX ORDER — CARBOPROST TROMETHAMINE 250 UG/ML
250 INJECTION, SOLUTION INTRAMUSCULAR
Status: DISCONTINUED | OUTPATIENT
Start: 2019-01-20 | End: 2019-01-21

## 2019-01-20 RX ORDER — CEFAZOLIN SODIUM 1 G/3ML
INJECTION, POWDER, FOR SOLUTION INTRAMUSCULAR; INTRAVENOUS PRN
Status: DISCONTINUED | OUTPATIENT
Start: 2019-01-20 | End: 2019-01-20

## 2019-01-20 RX ORDER — FENTANYL CITRATE 50 UG/ML
50-100 INJECTION, SOLUTION INTRAMUSCULAR; INTRAVENOUS
Status: DISCONTINUED | OUTPATIENT
Start: 2019-01-20 | End: 2019-01-21

## 2019-01-20 RX ORDER — EPHEDRINE SULFATE 50 MG/ML
5 INJECTION, SOLUTION INTRAMUSCULAR; INTRAVENOUS; SUBCUTANEOUS
Status: DISCONTINUED | OUTPATIENT
Start: 2019-01-20 | End: 2019-01-21

## 2019-01-20 RX ORDER — OXYCODONE AND ACETAMINOPHEN 5; 325 MG/1; MG/1
1 TABLET ORAL
Status: COMPLETED | OUTPATIENT
Start: 2019-01-20 | End: 2019-01-21

## 2019-01-20 RX ORDER — ONDANSETRON 2 MG/ML
INJECTION INTRAMUSCULAR; INTRAVENOUS PRN
Status: DISCONTINUED | OUTPATIENT
Start: 2019-01-20 | End: 2019-01-20

## 2019-01-20 RX ORDER — ONDANSETRON 2 MG/ML
4 INJECTION INTRAMUSCULAR; INTRAVENOUS EVERY 6 HOURS PRN
Status: DISCONTINUED | OUTPATIENT
Start: 2019-01-20 | End: 2019-01-21

## 2019-01-20 RX ORDER — OXYTOCIN/0.9 % SODIUM CHLORIDE 30/500 ML
100-340 PLASTIC BAG, INJECTION (ML) INTRAVENOUS CONTINUOUS PRN
Status: DISCONTINUED | OUTPATIENT
Start: 2019-01-20 | End: 2019-01-21

## 2019-01-20 RX ORDER — SODIUM CHLORIDE, SODIUM LACTATE, POTASSIUM CHLORIDE, CALCIUM CHLORIDE 600; 310; 30; 20 MG/100ML; MG/100ML; MG/100ML; MG/100ML
INJECTION, SOLUTION INTRAVENOUS CONTINUOUS
Status: DISCONTINUED | OUTPATIENT
Start: 2019-01-20 | End: 2019-01-21

## 2019-01-20 RX ORDER — METHYLERGONOVINE MALEATE 0.2 MG/ML
200 INJECTION INTRAVENOUS
Status: DISCONTINUED | OUTPATIENT
Start: 2019-01-20 | End: 2019-01-21

## 2019-01-20 RX ORDER — NALBUPHINE HYDROCHLORIDE 10 MG/ML
2.5-5 INJECTION, SOLUTION INTRAMUSCULAR; INTRAVENOUS; SUBCUTANEOUS EVERY 6 HOURS PRN
Status: DISCONTINUED | OUTPATIENT
Start: 2019-01-20 | End: 2019-01-21

## 2019-01-20 RX ORDER — KETOROLAC TROMETHAMINE 30 MG/ML
30 INJECTION, SOLUTION INTRAMUSCULAR; INTRAVENOUS EVERY 6 HOURS
Status: DISPENSED | OUTPATIENT
Start: 2019-01-21 | End: 2019-01-22

## 2019-01-20 RX ORDER — SODIUM CHLORIDE, SODIUM LACTATE, POTASSIUM CHLORIDE, CALCIUM CHLORIDE 600; 310; 30; 20 MG/100ML; MG/100ML; MG/100ML; MG/100ML
INJECTION, SOLUTION INTRAVENOUS CONTINUOUS PRN
Status: DISCONTINUED | OUTPATIENT
Start: 2019-01-20 | End: 2019-01-20

## 2019-01-20 RX ORDER — MISOPROSTOL 200 UG/1
TABLET ORAL
Status: DISCONTINUED
Start: 2019-01-20 | End: 2019-01-20 | Stop reason: HOSPADM

## 2019-01-20 RX ORDER — HYDROMORPHONE HYDROCHLORIDE 1 MG/ML
.3-.5 INJECTION, SOLUTION INTRAMUSCULAR; INTRAVENOUS; SUBCUTANEOUS EVERY 5 MIN PRN
Status: DISCONTINUED | OUTPATIENT
Start: 2019-01-20 | End: 2019-01-21 | Stop reason: HOSPADM

## 2019-01-20 RX ORDER — MORPHINE SULFATE 1 MG/ML
INJECTION, SOLUTION EPIDURAL; INTRATHECAL; INTRAVENOUS
Status: DISCONTINUED
Start: 2019-01-20 | End: 2019-01-20 | Stop reason: WASHOUT

## 2019-01-20 RX ORDER — ONDANSETRON 2 MG/ML
4 INJECTION INTRAMUSCULAR; INTRAVENOUS EVERY 30 MIN PRN
Status: DISCONTINUED | OUTPATIENT
Start: 2019-01-20 | End: 2019-01-21 | Stop reason: HOSPADM

## 2019-01-20 RX ORDER — NALOXONE HYDROCHLORIDE 0.4 MG/ML
.1-.4 INJECTION, SOLUTION INTRAMUSCULAR; INTRAVENOUS; SUBCUTANEOUS
Status: DISCONTINUED | OUTPATIENT
Start: 2019-01-20 | End: 2019-01-21

## 2019-01-20 RX ORDER — OXYTOCIN/0.9 % SODIUM CHLORIDE 30/500 ML
PLASTIC BAG, INJECTION (ML) INTRAVENOUS
Status: COMPLETED
Start: 2019-01-20 | End: 2019-01-20

## 2019-01-20 RX ORDER — OXYTOCIN 10 [USP'U]/ML
10 INJECTION, SOLUTION INTRAMUSCULAR; INTRAVENOUS
Status: DISCONTINUED | OUTPATIENT
Start: 2019-01-20 | End: 2019-01-21

## 2019-01-20 RX ORDER — LIDOCAINE HYDROCHLORIDE 10 MG/ML
INJECTION, SOLUTION INFILTRATION; PERINEURAL
Status: DISCONTINUED
Start: 2019-01-20 | End: 2019-01-20 | Stop reason: HOSPADM

## 2019-01-20 RX ORDER — CEFAZOLIN SODIUM 2 G/100ML
2 INJECTION, SOLUTION INTRAVENOUS
Status: DISCONTINUED | OUTPATIENT
Start: 2019-01-20 | End: 2019-01-21 | Stop reason: HOSPADM

## 2019-01-20 RX ORDER — OXYTOCIN/0.9 % SODIUM CHLORIDE 30/500 ML
100 PLASTIC BAG, INJECTION (ML) INTRAVENOUS CONTINUOUS
Status: DISCONTINUED | OUTPATIENT
Start: 2019-01-21 | End: 2019-01-23 | Stop reason: HOSPADM

## 2019-01-20 RX ORDER — LIDOCAINE HCL/EPINEPHRINE/PF 2%-1:200K
VIAL (ML) INJECTION PRN
Status: DISCONTINUED | OUTPATIENT
Start: 2019-01-20 | End: 2019-01-20

## 2019-01-20 RX ORDER — OXYTOCIN/0.9 % SODIUM CHLORIDE 30/500 ML
PLASTIC BAG, INJECTION (ML) INTRAVENOUS CONTINUOUS PRN
Status: DISCONTINUED | OUTPATIENT
Start: 2019-01-20 | End: 2019-01-20

## 2019-01-20 RX ORDER — ONDANSETRON 4 MG/1
4 TABLET, ORALLY DISINTEGRATING ORAL EVERY 30 MIN PRN
Status: DISCONTINUED | OUTPATIENT
Start: 2019-01-20 | End: 2019-01-21 | Stop reason: HOSPADM

## 2019-01-20 RX ORDER — SODIUM CHLORIDE, SODIUM LACTATE, POTASSIUM CHLORIDE, CALCIUM CHLORIDE 600; 310; 30; 20 MG/100ML; MG/100ML; MG/100ML; MG/100ML
INJECTION, SOLUTION INTRAVENOUS CONTINUOUS
Status: DISCONTINUED | OUTPATIENT
Start: 2019-01-20 | End: 2019-01-21 | Stop reason: HOSPADM

## 2019-01-20 RX ORDER — SODIUM CHLORIDE, SODIUM LACTATE, POTASSIUM CHLORIDE, CALCIUM CHLORIDE 600; 310; 30; 20 MG/100ML; MG/100ML; MG/100ML; MG/100ML
INJECTION, SOLUTION INTRAVENOUS
Status: COMPLETED
Start: 2019-01-20 | End: 2019-01-20

## 2019-01-20 RX ORDER — FENTANYL CITRATE 50 UG/ML
25-50 INJECTION, SOLUTION INTRAMUSCULAR; INTRAVENOUS
Status: DISCONTINUED | OUTPATIENT
Start: 2019-01-20 | End: 2019-01-21 | Stop reason: HOSPADM

## 2019-01-20 RX ADMIN — LIDOCAINE HYDROCHLORIDE,EPINEPHRINE BITARTRATE 5 ML: 20; .005 INJECTION, SOLUTION EPIDURAL; INFILTRATION; INTRACAUDAL; PERINEURAL at 23:09

## 2019-01-20 RX ADMIN — CEFAZOLIN 1 G: 1 INJECTION, POWDER, FOR SOLUTION INTRAMUSCULAR; INTRAVENOUS at 22:27

## 2019-01-20 RX ADMIN — Medication 2 MILLI-UNITS/MIN: at 13:42

## 2019-01-20 RX ADMIN — LIDOCAINE HYDROCHLORIDE,EPINEPHRINE BITARTRATE 5 ML: 20; .005 INJECTION, SOLUTION EPIDURAL; INFILTRATION; INTRACAUDAL; PERINEURAL at 22:17

## 2019-01-20 RX ADMIN — OXYTOCIN-SODIUM CHLORIDE 0.9% IV SOLN 30 UNIT/500ML 600 ML/HR: 30-0.9/5 SOLUTION at 22:48

## 2019-01-20 RX ADMIN — EPINEPHRINE 20 ML GIVEN: 1 INJECTION PARENTERAL at 23:50

## 2019-01-20 RX ADMIN — LIDOCAINE HYDROCHLORIDE,EPINEPHRINE BITARTRATE 3 ML: 20; .005 INJECTION, SOLUTION EPIDURAL; INFILTRATION; INTRACAUDAL; PERINEURAL at 05:48

## 2019-01-20 RX ADMIN — ONDANSETRON 4 MG: 2 INJECTION INTRAMUSCULAR; INTRAVENOUS at 22:33

## 2019-01-20 RX ADMIN — SODIUM CHLORIDE, SODIUM LACTATE, POTASSIUM CHLORIDE, CALCIUM CHLORIDE: 600; 310; 30; 20 INJECTION, SOLUTION INTRAVENOUS at 22:19

## 2019-01-20 RX ADMIN — SODIUM CHLORIDE, POTASSIUM CHLORIDE, SODIUM LACTATE AND CALCIUM CHLORIDE: 600; 310; 30; 20 INJECTION, SOLUTION INTRAVENOUS at 13:38

## 2019-01-20 RX ADMIN — BUPIVACAINE 20 ML: 13.3 INJECTION, SUSPENSION, LIPOSOMAL INFILTRATION at 23:50

## 2019-01-20 RX ADMIN — AZITHROMYCIN MONOHYDRATE 500 MG: 500 INJECTION, POWDER, LYOPHILIZED, FOR SOLUTION INTRAVENOUS at 22:48

## 2019-01-20 RX ADMIN — SODIUM CITRATE AND CITRIC ACID MONOHYDRATE 30 ML: 500; 334 SOLUTION ORAL at 22:10

## 2019-01-20 RX ADMIN — PHENYLEPHRINE HYDROCHLORIDE 100 MCG: 10 INJECTION, SOLUTION INTRAMUSCULAR; INTRAVENOUS; SUBCUTANEOUS at 23:13

## 2019-01-20 RX ADMIN — LIDOCAINE HYDROCHLORIDE,EPINEPHRINE BITARTRATE 5 ML: 20; .005 INJECTION, SOLUTION EPIDURAL; INFILTRATION; INTRACAUDAL; PERINEURAL at 22:21

## 2019-01-20 RX ADMIN — LIDOCAINE HYDROCHLORIDE,EPINEPHRINE BITARTRATE 5 ML: 20; .005 INJECTION, SOLUTION EPIDURAL; INFILTRATION; INTRACAUDAL; PERINEURAL at 22:29

## 2019-01-20 RX ADMIN — Medication 8 ML: at 05:53

## 2019-01-20 RX ADMIN — SODIUM CHLORIDE, POTASSIUM CHLORIDE, SODIUM LACTATE AND CALCIUM CHLORIDE 1000 ML: 600; 310; 30; 20 INJECTION, SOLUTION INTRAVENOUS at 04:53

## 2019-01-20 RX ADMIN — OXYTOCIN-SODIUM CHLORIDE 0.9% IV SOLN 30 UNIT/500ML 2 MILLI-UNITS/MIN: 30-0.9/5 SOLUTION at 13:42

## 2019-01-20 RX ADMIN — Medication 10 ML/HR: at 05:53

## 2019-01-20 RX ADMIN — SODIUM CHLORIDE, SODIUM LACTATE, POTASSIUM CHLORIDE, CALCIUM CHLORIDE: 600; 310; 30; 20 INJECTION, SOLUTION INTRAVENOUS at 22:50

## 2019-01-20 RX ADMIN — KETOROLAC TROMETHAMINE 30 MG: 30 INJECTION, SOLUTION INTRAMUSCULAR at 23:19

## 2019-01-20 RX ADMIN — SODIUM CHLORIDE, POTASSIUM CHLORIDE, SODIUM LACTATE AND CALCIUM CHLORIDE: 600; 310; 30; 20 INJECTION, SOLUTION INTRAVENOUS at 05:56

## 2019-01-20 NOTE — ANESTHESIA PROCEDURE NOTES
Epidural Procedure Note    Date/Time: 1/20/2019 5:48 AM  Staff:     Anesthesiologist:  Josie Canchola MD  Location: OB and floor     Procedure start time:  1/20/2019 5:42 AM     Procedure end time:  1/20/2019 6:01 AM   Pre-procedure checklist:   patient identified, IV checked, site marked, risks and benefits discussed, informed consent, monitors and equipment checked, pre-op evaluation, at physician/surgeon's request and post-op pain management      Correct Patient: Yes      Correct Position: Yes      Correct Site: Yes      Correct Procedure: Yes      Correct Laterality:  Yes    Site Marked:  Yes  Procedure:     Procedure:  Epidural catheter    ASA:  2    Diagnosis:  Labor pain    Position:  Sitting    Sterile Prep: chloraprep, mask, sterile gloves and patient draped      Insertion site:  L3-4    Local skin infiltration:  1% lidocaine    amount (mL):  3    Approach:  Midline    Needle gauge (G):  17    Needle Length (in):  3.5    Block Needle Type:  Touhy    Injection Technique:  LORT saline    TE at (cm):  6    Attempts:  1    Redirects:  1    Catheter gauge (G):  19    Catheter threaded easily: Yes      Threaded to cm at skin:  10    Paresthesias:  No    Aspiration negative for Heme or CSF: Yes      Test dose (mL):  3    Test dose time:  05:48    Test dose negative for signs of intravascular, subdural or intrathecal injection: Yes    Assessment/Narrative:      No issues. Easy placement.

## 2019-01-20 NOTE — PROGRESS NOTES
Labor Progress Note    S: Resting in bed with  at bedside for support. Good pain relief with the epidural. Starting to feel some rectal pressure with contractions. Prefers to defer VE for now as she would like to rest for another 1-2 hours. Encouraged PO hydration and calories, rest prn. No other questions or concerns at this time.     O:  Blood pressure 99/55, temperature 98.1  F (36.7  C), resp. rate 16, last menstrual period 2018, SpO2 97 %, not currently breastfeeding.  General appearance: comfortable.  Contractions: Every 2-6 minutes. 60-90 seconds duration.  Palpate: moderate.  Soft resting tone.   FHT: Baseline 125 with moderate variability. Accelerations present. No decelerations present.  ROM: clear fluid. Membranes have been ruptured for 12.5 hours.  Pelvic exam: deferred    Pitocin- none,  Antibiotics- none    A:  33 year old  with IUP @ 41w0d SROM and early labor   Fetal Heart rate tracing category one  GBS- negative  Patient Active Problem List   Diagnosis     Seasonal allergic rhinitis     Supervision of normal first pregnancy, [facility] CNM     Hematuria     Abnormal glucose affecting pregnancy- passed 3 hour     Labor and delivery, indication for care     P:  -Anticipate    -Comfort measures prn  -Reassess in 1-2 hours, sooner prn.    Lorena Grijalva, MELITA, APRN

## 2019-01-20 NOTE — ANESTHESIA PREPROCEDURE EVALUATION
"Anesthesia Pre-Procedure Evaluation    Patient: Vianca Valle   MRN:     [id number] Gender:   female   Age:    33 year old :      1985        Preoperative Diagnosis: * No pre-op diagnosis entered *   * No procedures listed *     Past Medical History:   Diagnosis Date     Allergic rhinitis      Hematuria 2017     Ruptured ear drum, right 2016      Past Surgical History:   Procedure Laterality Date     TONSILLECTOMY      2002          Anesthesia Evaluation       history and physical reviewed .             ROS/MED HX    ENT/Pulmonary:  - neg pulmonary ROS     Neurologic:  - neg neurologic ROS     Cardiovascular:  - neg cardiovascular ROS       METS/Exercise Tolerance:     Hematologic:         Musculoskeletal:         GI/Hepatic:     (+) GERD       Renal/Genitourinary:         Endo:         Psychiatric:         Infectious Disease:         Malignancy:         Other:                     [facility] AN PHYSICAL EXAM    Lab Results   Component Value Date    WBC 16.1 (H) 2019    HGB 13.6 2019    HCT 40.6 2019     2019     10/24/2013    POTASSIUM 4.0 10/24/2013    CHLORIDE 107 10/24/2013    CO2 28 10/24/2013    BUN 13 10/24/2013    CR 0.78 10/24/2013    GLC 89 10/24/2013    TONI 9.0 10/24/2013    TSH 1.30 2014       Preop Vitals  BP Readings from Last 3 Encounters:   19 118/61   19 125/80   19 112/79    Pulse Readings from Last 3 Encounters:   19 102   19 99   19 93      Resp Readings from Last 3 Encounters:   19 20    SpO2 Readings from Last 3 Encounters:   19 100%      Temp Readings from Last 1 Encounters:   19 36.3  C (97.4  F) (Oral)    Ht Readings from Last 1 Encounters:   18 1.6 m (5' 3\")      Wt Readings from Last 1 Encounters:   19 85 kg (187 lb 6.4 oz)    Estimated body mass index is 33.2 kg/m  as calculated from the following:    Height as of 18: 1.6 m (5' 3\").    Weight as of 19: 85 kg " (187 lb 6.4 oz).     LDA:  Peripheral IV 01/20/19 Left;Posterior Hand (Active)   Site Assessment WDL 1/20/2019  5:00 AM   Line Status Infusing 1/20/2019  5:00 AM   Phlebitis Scale 0-->no symptoms 1/20/2019  5:00 AM   Infiltration Scale 0 1/20/2019  5:00 AM   Number of days: 0            Assessment:   ASA SCORE: 2       Documentation: H&P complete   Proceeding: Proceed without further delay  Tobacco Use:  NO Active use of Tobacco/UNKNOWN Tobacco use status     Plan:   Anes. Type:  Regional     RA Details:  Labor/OB Procedure; Catheter     RA-Location/Type: Epidural (L)            Access/Monitoring: PIV   Maintenance: LA-Catheter           Postop Pain/Sedation Strategy:  Standard-Options: Block SS; Exparel     PONV Management:  Adult Risk Factors: Female, Non-Smoker  Prevention: Ondansetron     CONSENT: Direct conversation   Plan and risks discussed with: Patient; Spouse   Blood Products: Consented (ALL Blood Products)       Comments for Plan/Consent:  Reviewed epidural risks.  All questions answered.  Pt agrees with plan, consent signed, and wishes to proceed.             neg OB ROS                   Josie Canchola MD       Pt with epidural in place, now plan for c/s 2/2 arrest of decent and category II tracing. Plan to bolus epidural.    Angel Orellana MD  Staff Anesthesiologist  10:43 PM January 20, 2019

## 2019-01-20 NOTE — PLAN OF CARE
VSS, afebrile, category 1 tracing. Comfortable, has a little generalized itching from epidural medication. CNM aware of intermittent spacing of contractions, she will consider offering pitocin after next cervical exam. Cont. Plan of care.

## 2019-01-20 NOTE — PLAN OF CARE
Pt VSS. Afebrile. Pt comfortable with epidural. Pitocin started per protocol. Pt repositioned frequently. MELITA Grijalva aware of pt labor status. Report given to JUDD Alcantar.

## 2019-01-20 NOTE — PROVIDER NOTIFICATION
01/20/19 1215   Provider Notification   Provider Name/Title Lorena Grijalva   Method of Notification Electronic Page   Notification Reason Uterine Activity     Contractions spaced to 2-7minutes. CNM requested to evaluate.

## 2019-01-20 NOTE — TELEPHONE ENCOUNTER
"Telephone call from Vianca this evening. Felt a \"pop\" and small gush of fluid at 1950. Fluid was clear, no odor. Not currently experiencing any contractions. Occasional cramping. Baby active throughout day today. No bleeding, no fever, feels well overall. GBS negative. Discussed both expectant management and induction of labor. Does not desire induction of labor at this time and would like to stay home and await labor. Reviewed when to call or come to hospital if developed fever, decreased FM, bleeding, or s/sx of labor. Also reviewed comfort measures at home. If no labor over night tonight, will call on-call midwife in am to develop plan.   AMANDA Francis CNM    "

## 2019-01-20 NOTE — PROGRESS NOTES
Labor Progress Note    S: Resting in bed. Comfortable with the epidural.  and mother at bedside for support. Feeling less rectal pressure. Agreeable to SVE.     O:  Blood pressure 104/65, temperature 97.7  F (36.5  C), temperature source Oral, resp. rate 16, last menstrual period 2018, SpO2 97 %, not currently breastfeeding.  General appearance: comfortable.  Contractions: Every 1.5-4 minutes. 60-90 seconds duration.  Palpate: moderate.  Soft resting tone.   FHT: Baseline 145 with moderate variability. Accelerations present. No decelerations present.  ROM: clear fluid. Membranes have been ruptured for 14.5 hours.  Pelvic exam: 6/ 100%/ Mid/ soft/ -2. OA, acyclitic with midline suture to mother's right.     Pitocin- none,  Antibiotics- none    A:  33 year old  with IUP @ 41w0d active labor and good progress   Fetal Heart rate tracing category one  GBS- negative  Acylclitic presentation    Patient Active Problem List   Diagnosis     Seasonal allergic rhinitis     Supervision of normal first pregnancy, S CNM     Hematuria     Abnormal glucose affecting pregnancy- passed 3 hour     Labor and delivery, indication for care       P:  -Cont. Expectant management of labor  -Peanut ball to facilitate optimal fetal positioning   -Anticipate    -Comfort measures prn  -Reassess in 2-3 hours, sooner prn.    Lorena Grijalva CNM, APRN

## 2019-01-20 NOTE — PROGRESS NOTES
Labor Progress Note    S: Resting comfortably in bed without any questions or concerns.     O:  Blood pressure 115/68, temperature 99.2  F (37.3  C), temperature source Oral, resp. rate 16, last menstrual period 2018, SpO2 97 %, not currently breastfeeding.  General appearance: comfortable.  Contractions: Every 1.5-3 minutes.  seconds duration.  Palpate: moderate.  Soft resting tone.   FHT: Baseline 140 with moderate variability. Accelerations present. Variable and early decelerations present.  ROM: clear fluid. Membranes have been ruptured for 21 hours.  Pelvic exam: 10/ 100%/0    Pitocin- 6 mu/min.,  Antibiotics- none    A:  33 year old  with IUP @ 41w0d second stage labor and good progress   Fetal Heart rate tracing category two  GBS- negative  Prolonged rupture of membranes, afebrile    Patient Active Problem List   Diagnosis     Seasonal allergic rhinitis     Supervision of normal first pregnancy, [facility] CNM     Hematuria     Abnormal glucose affecting pregnancy- passed 3 hour     Labor and delivery, indication for care       P:  -Continue pitocin per protocol  -High fowlers positioning  -Discussed risks and benefits of laboring down versus initiating pushing immediately with pt, her partner and naomi mother including possible increased r/f infection with delayed pushing. Pt desires 1 hour of laboring down. Agreeable to pushing sooner if she feels urge/pressure.   -Anticipate    -Comfort measures prn  -Reassess in 1 hour, sooner prn.    Lorena Grijalva, MELITA, APRN

## 2019-01-20 NOTE — PLAN OF CARE
Data: Patient presented to [facility] at 0347.   Reason for maternal/fetal assessment per patient is Contractions (SROM clear at 1950)  .  Patient is a . Prenatal record reviewed.      Obstetric History       T0      L0     SAB0   TAB0   Ectopic0   Multiple0   Live Births0       # Outcome Date GA Lbr Jeronimo/2nd Weight Sex Delivery Anes PTL Lv   1 Current               . Medical history:   Past Medical History:   Diagnosis Date     Allergic rhinitis      Hematuria 2017     Ruptured ear drum, right 2016   . Gestational Age 41w0d. VSS. Cervix 3 cm per CNM. Fetal movement present. Patient denies vaginal discharge, pelvic pressure, UTI symptoms, GI problems, bloody show, vaginal bleeding, edema, headache, visual disturbances, epigastric or URQ pain, abdominal pain. Support person Bartolo present.  Action: Verbal consent for EFM. Triage assessment completed. EFM applied for fetal surveillance. Uterine assessment per toco. Fetal assessment: Presumed adequate fetal oxygenation documented (see flow record).   Response: MINERVA CHANM informed of pt arrival. Plan per provider is admit to inpatient and prep for epidural. Patient verbalized agreement with plan.

## 2019-01-20 NOTE — PLAN OF CARE
Epidural placed per anesthesia. Pt resting now, using peanut ball to facilitate labor. Feeling rectal pressure during contractions but otherwise comfortable. Continues to leak clear fluid. VSS. See flowsheets for EFM.  Bartolo at bedside. Anticipate .

## 2019-01-20 NOTE — PROGRESS NOTES
Labor Progress Note    S: Resting comfortably in bed. Good pain control with epidural. Tolerating PO food and nutrition. No questions or concerns. Asked to evaluate pt by RN as contractions have spaced out.     O:  Blood pressure 104/56, temperature 99.1  F (37.3  C), temperature source Oral, resp. rate 18, last menstrual period 2018, SpO2 98 %, not currently breastfeeding.  General appearance: comfortable.  Contractions: Every 2-6 minutes. 60-90 seconds duration.  Palpate: moderate.  Soft resting tone.   FHT: Baseline 145 with moderate variability. Accelerations present. No decelerations present.  ROM: clear fluid. Membranes have been ruptured for 18 hours.  Pelvic exam: 7/ 100%/ Mid/ soft/ -2, Acynclitic    Pitocin- none,  Antibiotics- none    A:  33 year old  with IUP @ 41w0d active labor   Fetal Heart rate tracing category one  GBS- negative  Acynclitic presentation   Patient Active Problem List   Diagnosis     Seasonal allergic rhinitis     Supervision of normal first pregnancy, [facility] CNM     Hematuria     Abnormal glucose affecting pregnancy- passed 3 hour     Labor and delivery, indication for care         P:  -Discussed r/b of labor augmentation with pitocin with pt, partner and mother given irregular contraction pattern. Also discussed option to try rebozo now and wait 30 minutes to see if contraction pattern becomes more regular prior to starting pitocin. Pt would like to try rebozo first, and consents to pitocin if contractions do not become closer in the 30 minutes following rebozo.   -Anticipate    -Comfort measures prn  -Reassess in 2 hours, sooner prn.    Lorena Grijalva, MELITA, APRN

## 2019-01-20 NOTE — H&P
[facility] History and Physical    Vianca Valle MRN# [id number]   Age: 33 year old YOB: 1985     Date of Admission:  2019    Primary care provider: ANUPAMA HUA          Chief Complaint:   Vianca Valle is a 33 year old female  who is 41w0d pregnant and being admitted for early labor. SROM with clear fluid at 1950 on 2019, GBS negative. Contractions began at 2300, becoming stronger around 0300. Now jared every 2-3 minutes, breathing through them. She is accompanied by her  Bartolo. Desires epidural for pain relief.           Pregnancy history:   Prenatal records reviewed.   ANUPAMA HUA patient, initiated care at 8 weeks x 14 visits  HT 5-3  BMI 29  Prepregnancy weight 168- now 187. TW#  OBSTETRIC HISTORY:    Obstetric History       T0      L0     SAB0   TAB0   Ectopic0   Multiple0   Live Births0       # Outcome Date GA Lbr Jeronimo/2nd Weight Sex Delivery Anes PTL Lv   1 Current                   EDC: Estimated Date of Delivery: 19 by LMP, consistent with 8 week US     Prenatal Labs:   Lab Results   Component Value Date    ABO A 2018    RH Pos 2018    AS Neg 2018    HEPBANG Nonreactive 2018    CHPCRT Negative 2018    GCPCRT Negative 2018    HGB 12.0 2018    HIV Negative 10/24/2013       GBS Status:   Lab Results   Component Value Date    GBS Negative 2018       Active Problem List  Patient Active Problem List   Diagnosis     Seasonal allergic rhinitis     Supervision of normal first pregnancy, ANUPAMA HUA     Hematuria     Abnormal glucose affecting pregnancy- passed 3 hour     Labor and delivery, indication for care       Medication Prior to Admission  Medications Prior to Admission   Medication Sig Dispense Refill Last Dose     Omega-3 Fatty Acids (FISH OIL PO) Take by mouth daily   Past Week at Unknown time     Prenatal Vit-Fe Fumarate-FA (PRENATAL PO) Take by mouth daily   Past Week at Unknown  time     probiotic CAPS    Past Week at Unknown time     VITAMIN D, CHOLECALCIFEROL, PO Take by mouth daily   Past Week at Unknown time     HERBALS    Taking     order for DME Electric breast pump 1 each 0    .        Maternal Past Medical History:     Past Medical History:   Diagnosis Date     Allergic rhinitis      Hematuria 2017     Ruptured ear drum, right 2016                       Family History:     Family History   Problem Relation Age of Onset     Thyroid Disease Maternal Grandmother      MINERVAAALINA. Maternal Grandfather      Diabetes Paternal Grandmother             Social History:    to Bartolo, works fulltime labwork. Denies etoh, tobacco, drug use during pregnancy.          Review of Systems:   The Review of Systems is negative other than noted in the HPI          Physical Exam:   Vitals were reviewed: /71   Temp 97.4  F (36.3  C) (Oral)   Resp 20   LMP 2018 (Exact Date)     Constitutional:   awake, alert, cooperative. Working with/breathing with contractions.      Lungs:   No increased work of breathing, good air exchange, clear to auscultation bilaterally, no crackles or wheezing     Cardiovascular:   Normal apical impulse, regular rate and rhythm, normal S1 and S2, no S3 or S4, and no murmur noted     Abdomen:   Gravid uterus, cephalic by leopolds. EFW 8#      Ext: DTRs wnl.   Cervix:   Membranes: SROM, leaking clear amniotic fluid   Dilation: 3   Effacement: 90%   Station:0   Consistency: soft   Position: Anterior  Presentation:Cephalic  Fetal Heart Rate Tracing: Baseline 140 with moderate variability. Accelerations present. No decelerations.   Tocometer: Contractions every                        Assessment:   Vianca Valle is a 41w0d pregnant female admitted with early labor  FHR tracing: category 1   GBS negative         Plan:   Admit - see IP orders  Pain medication: desires epidural.  Anticipate     AMANDA Francis CNM

## 2019-01-21 PROBLEM — Z98.891 S/P CESAREAN SECTION: Status: ACTIVE | Noted: 2019-01-21

## 2019-01-21 LAB — HGB BLD-MCNC: 10.9 G/DL (ref 11.7–15.7)

## 2019-01-21 PROCEDURE — 12000001 ZZH R&B MED SURG/OB UMMC

## 2019-01-21 PROCEDURE — 25000132 ZZH RX MED GY IP 250 OP 250 PS 637: Performed by: STUDENT IN AN ORGANIZED HEALTH CARE EDUCATION/TRAINING PROGRAM

## 2019-01-21 PROCEDURE — 85018 HEMOGLOBIN: CPT | Performed by: STUDENT IN AN ORGANIZED HEALTH CARE EDUCATION/TRAINING PROGRAM

## 2019-01-21 PROCEDURE — 25000132 ZZH RX MED GY IP 250 OP 250 PS 637: Performed by: ADVANCED PRACTICE MIDWIFE

## 2019-01-21 PROCEDURE — 25000128 H RX IP 250 OP 636: Performed by: STUDENT IN AN ORGANIZED HEALTH CARE EDUCATION/TRAINING PROGRAM

## 2019-01-21 PROCEDURE — 36415 COLL VENOUS BLD VENIPUNCTURE: CPT | Performed by: STUDENT IN AN ORGANIZED HEALTH CARE EDUCATION/TRAINING PROGRAM

## 2019-01-21 RX ORDER — ACETAMINOPHEN 325 MG/1
650 TABLET ORAL EVERY 4 HOURS PRN
Status: DISCONTINUED | OUTPATIENT
Start: 2019-01-23 | End: 2019-01-23 | Stop reason: HOSPADM

## 2019-01-21 RX ORDER — AMOXICILLIN 250 MG
2 CAPSULE ORAL 2 TIMES DAILY PRN
Status: DISCONTINUED | OUTPATIENT
Start: 2019-01-21 | End: 2019-01-23 | Stop reason: HOSPADM

## 2019-01-21 RX ORDER — BISACODYL 10 MG
10 SUPPOSITORY, RECTAL RECTAL DAILY PRN
Status: DISCONTINUED | OUTPATIENT
Start: 2019-01-22 | End: 2019-01-23 | Stop reason: HOSPADM

## 2019-01-21 RX ORDER — ACETAMINOPHEN 325 MG/1
975 TABLET ORAL EVERY 8 HOURS
Status: DISCONTINUED | OUTPATIENT
Start: 2019-01-21 | End: 2019-01-23 | Stop reason: HOSPADM

## 2019-01-21 RX ORDER — MISOPROSTOL 200 UG/1
800 TABLET ORAL
Status: DISCONTINUED | OUTPATIENT
Start: 2019-01-21 | End: 2019-01-23 | Stop reason: HOSPADM

## 2019-01-21 RX ORDER — SIMETHICONE 80 MG
80 TABLET,CHEWABLE ORAL 4 TIMES DAILY PRN
Status: DISCONTINUED | OUTPATIENT
Start: 2019-01-21 | End: 2019-01-23 | Stop reason: HOSPADM

## 2019-01-21 RX ORDER — DIPHENHYDRAMINE HYDROCHLORIDE 50 MG/ML
25 INJECTION INTRAMUSCULAR; INTRAVENOUS EVERY 6 HOURS PRN
Status: DISCONTINUED | OUTPATIENT
Start: 2019-01-21 | End: 2019-01-23 | Stop reason: HOSPADM

## 2019-01-21 RX ORDER — DEXTROSE, SODIUM CHLORIDE, SODIUM LACTATE, POTASSIUM CHLORIDE, AND CALCIUM CHLORIDE 5; .6; .31; .03; .02 G/100ML; G/100ML; G/100ML; G/100ML; G/100ML
INJECTION, SOLUTION INTRAVENOUS CONTINUOUS
Status: DISCONTINUED | OUTPATIENT
Start: 2019-01-21 | End: 2019-01-23 | Stop reason: HOSPADM

## 2019-01-21 RX ORDER — OXYCODONE HYDROCHLORIDE 5 MG/1
5-10 TABLET ORAL EVERY 4 HOURS PRN
Status: DISCONTINUED | OUTPATIENT
Start: 2019-01-21 | End: 2019-01-23 | Stop reason: HOSPADM

## 2019-01-21 RX ORDER — IBUPROFEN 800 MG/1
800 TABLET, FILM COATED ORAL EVERY 6 HOURS PRN
Status: DISCONTINUED | OUTPATIENT
Start: 2019-01-21 | End: 2019-01-23 | Stop reason: HOSPADM

## 2019-01-21 RX ORDER — LIDOCAINE 40 MG/G
CREAM TOPICAL
Status: DISCONTINUED | OUTPATIENT
Start: 2019-01-21 | End: 2019-01-23 | Stop reason: HOSPADM

## 2019-01-21 RX ORDER — ONDANSETRON 2 MG/ML
4 INJECTION INTRAMUSCULAR; INTRAVENOUS EVERY 6 HOURS PRN
Status: DISCONTINUED | OUTPATIENT
Start: 2019-01-21 | End: 2019-01-23 | Stop reason: HOSPADM

## 2019-01-21 RX ORDER — OXYTOCIN 10 [USP'U]/ML
10 INJECTION, SOLUTION INTRAMUSCULAR; INTRAVENOUS
Status: DISCONTINUED | OUTPATIENT
Start: 2019-01-21 | End: 2019-01-23 | Stop reason: HOSPADM

## 2019-01-21 RX ORDER — METHYLERGONOVINE MALEATE 0.2 MG/ML
200 INJECTION INTRAVENOUS
Status: DISCONTINUED | OUTPATIENT
Start: 2019-01-21 | End: 2019-01-23 | Stop reason: HOSPADM

## 2019-01-21 RX ORDER — AMOXICILLIN 250 MG
1 CAPSULE ORAL 2 TIMES DAILY PRN
Status: DISCONTINUED | OUTPATIENT
Start: 2019-01-21 | End: 2019-01-23 | Stop reason: HOSPADM

## 2019-01-21 RX ORDER — DIPHENHYDRAMINE HCL 25 MG
25 CAPSULE ORAL EVERY 6 HOURS PRN
Status: DISCONTINUED | OUTPATIENT
Start: 2019-01-21 | End: 2019-01-23 | Stop reason: HOSPADM

## 2019-01-21 RX ORDER — CARBOPROST TROMETHAMINE 250 UG/ML
250 INJECTION, SOLUTION INTRAMUSCULAR
Status: DISCONTINUED | OUTPATIENT
Start: 2019-01-21 | End: 2019-01-23 | Stop reason: HOSPADM

## 2019-01-21 RX ORDER — NALOXONE HYDROCHLORIDE 0.4 MG/ML
.1-.4 INJECTION, SOLUTION INTRAMUSCULAR; INTRAVENOUS; SUBCUTANEOUS
Status: DISCONTINUED | OUTPATIENT
Start: 2019-01-21 | End: 2019-01-23 | Stop reason: HOSPADM

## 2019-01-21 RX ORDER — NALOXONE HYDROCHLORIDE 0.4 MG/ML
.1-.4 INJECTION, SOLUTION INTRAMUSCULAR; INTRAVENOUS; SUBCUTANEOUS
Status: ACTIVE | OUTPATIENT
Start: 2019-01-21 | End: 2019-01-22

## 2019-01-21 RX ORDER — LANOLIN 100 %
OINTMENT (GRAM) TOPICAL
Status: DISCONTINUED | OUTPATIENT
Start: 2019-01-21 | End: 2019-01-23 | Stop reason: HOSPADM

## 2019-01-21 RX ORDER — OXYTOCIN/0.9 % SODIUM CHLORIDE 30/500 ML
340 PLASTIC BAG, INJECTION (ML) INTRAVENOUS CONTINUOUS PRN
Status: DISCONTINUED | OUTPATIENT
Start: 2019-01-21 | End: 2019-01-23 | Stop reason: HOSPADM

## 2019-01-21 RX ORDER — HYDROCORTISONE 2.5 %
CREAM (GRAM) TOPICAL 3 TIMES DAILY PRN
Status: DISCONTINUED | OUTPATIENT
Start: 2019-01-21 | End: 2019-01-23 | Stop reason: HOSPADM

## 2019-01-21 RX ADMIN — SENNOSIDES AND DOCUSATE SODIUM 1 TABLET: 8.6; 5 TABLET ORAL at 20:06

## 2019-01-21 RX ADMIN — ACETAMINOPHEN 975 MG: 325 TABLET, FILM COATED ORAL at 09:38

## 2019-01-21 RX ADMIN — ACETAMINOPHEN 975 MG: 325 TABLET, FILM COATED ORAL at 01:28

## 2019-01-21 RX ADMIN — IBUPROFEN 800 MG: 800 TABLET, FILM COATED ORAL at 19:38

## 2019-01-21 RX ADMIN — ACETAMINOPHEN 975 MG: 325 TABLET, FILM COATED ORAL at 17:44

## 2019-01-21 RX ADMIN — KETOROLAC TROMETHAMINE 30 MG: 30 INJECTION, SOLUTION INTRAMUSCULAR at 12:50

## 2019-01-21 RX ADMIN — OXYCODONE HYDROCHLORIDE 5 MG: 5 TABLET ORAL at 09:38

## 2019-01-21 RX ADMIN — SENNOSIDES AND DOCUSATE SODIUM 1 TABLET: 8.6; 5 TABLET ORAL at 09:40

## 2019-01-21 RX ADMIN — OXYCODONE HYDROCHLORIDE AND ACETAMINOPHEN 1 TABLET: 5; 325 TABLET ORAL at 02:50

## 2019-01-21 RX ADMIN — SODIUM CHLORIDE, SODIUM LACTATE, POTASSIUM CHLORIDE, CALCIUM CHLORIDE AND DEXTROSE MONOHYDRATE: 5; 600; 310; 30; 20 INJECTION, SOLUTION INTRAVENOUS at 06:24

## 2019-01-21 RX ADMIN — KETOROLAC TROMETHAMINE 30 MG: 30 INJECTION, SOLUTION INTRAMUSCULAR at 06:25

## 2019-01-21 NOTE — ANESTHESIA POSTPROCEDURE EVALUATION
Anesthesia POST Procedure Evaluation    Patient: Vianca Valle   MRN:     [id number] Gender:   female   Age:    33 year old :      1985        Preoperative Diagnosis: Pregnancy   Procedure(s):   SECTION   Postop Comments: No value filed.       Anesthesia Type:  Regional    Reportable Event: NO     PAIN: Uncomplicated   Sign Out status: Comfortable, Well controlled pain     PONV: No PONV   Sign Out status:  No Nausea or Vomiting     Neuro/Psych: Uneventful perioperative course   Sign Out Status: Preoperative baseline; Age appropriate mentation     Airway/Resp.: Uneventful perioperative course   Sign Out Status: Non labored breathing, age appropriate RR; Resp. Status within EXPECTED Parameters     CV: Uneventful perioperative course   Sign Out status: Appropriate BP and perfusion indices; Appropriate HR/Rhythm     Disposition:   Sign Out in:  Labor and Delivery  Disposition:  Floor  Recovery Course: Uneventful  Follow-Up: Not required           Last Anesthesia Record Vitals:  CRNA VITALS  2019 2305 - 2019 0005      2019             NIBP:  110/65    Ht Rate:  110          Last PACU/Preop Vitals:  Vitals:    19 2355 19 0010 19 0025   BP: 96/71 112/69 102/69   Pulse: 125 114 106   Resp: 20  16   Temp:      SpO2: 100% 99% 99%         Electronically Signed By: Angel Orellana MD, 2019, 12:45 AM

## 2019-01-21 NOTE — PLAN OF CARE
Data: Patient came to Postpartum unit at 0200 via transfer bed. ID band check was done with L&D nurse. Vital signs within normal limits. Postpartum checks within normal limits - see flow record. Patient eating and drinking normally. Patient has yen catheter in place and is able to turn in bed. No apparent signs of infection. Incision covered and clean, dry, and intact. Patient performing self cares and is able to care for infant.  Action: Patient medicated during the shift for pain. See MAR. Patient reassessed within 1 hour after each medication and pain was improved - patient stated she was comfortable. Patient education done about room orientation, breastfeeding, and vital signs checks. See flow record.  Response: Positive attachment behaviors observed with infant. Support persons present.   Plan: Will continue plan of care.

## 2019-01-21 NOTE — PROGRESS NOTES
Blood pressure 135/82, temperature 99.9  F (37.7  C), temperature source Oral, resp. rate 16, last menstrual period 2018, SpO2 98 %, not currently breastfeeding.  Late entry for 2130pm    General appearance: tired, has been pushing for two hours in variety of positions. Pitocin was at max of 11mu/min, now decreased to 10mu/min for contractions q 1-2 minutes.  FHT baseline 160 with minimal to moderate variability with increased to 170 without pushing.  SVE unchanged with vertex at 0 station and caput to +1.     CONTACTIONS: moderate, strong and every 1-2 minutes  Pitocin- 10 mu/min.,  Antibiotics- none  FETAL HEART TONES: continuous EFM- baseline 160 with minimal to moderate variability intemittent variable decelerations. Periodic increases in baseline to 170.  ROM: now noted to be meconium  PELVIC EXAM:unchanged. Complete/ 0 station with caput to +1  # Pain Assessment:  Current Pain Score 2019   Patient currently in pain? mague Coley s pain level was assessed and she currently denies pain with epidural.      ASSESSMENT:  ==============  IUP @ 41w0d second stage x4.5 hours, pushing intermittently for first two hours and with good maternal effort for last 2 hours.  SROM x 25 hours   Low grade maternal temp  Fetal Heart Rate Tracing category two  GBS- negative  Patient Active Problem List   Diagnosis     Seasonal allergic rhinitis     Supervision of normal first pregnancy, [facility] CNM     Hematuria     Abnormal glucose affecting pregnancy- passed 3 hour     Labor and delivery, indication for care     PLAN:  ===========  Consult with Dr España. In talking with pt and consenting for  birth  Pt and  agree to plan   AMANDA Mohan CNM

## 2019-01-21 NOTE — PROGRESS NOTES
Late entry from 730 PM.  Pt was found to be OP by CNM prior to my assistance.      Head was found to be LOP.  With minimal resistance, occiput was rotated to CHARLEEN.  FHT with variable appearing decel to 80s at time of rotation which quickly returned to baseline 145-150 with moderate variability.      Anticipated  at that time.     Dotty España MD, FACOG  [facility] Staff  OB/GYN    2019  9:59 PM

## 2019-01-21 NOTE — DISCHARGE SUMMARY
[facility] Discharge Summary    Vianca Valle MRN# [id number]   Age: 33 year old YOB: 1985     Date of Admission: 2019  Date of Discharge: 19  Admitting Provider: AMANDA Francis CNM  Discharge Physician: Che Coronado MD             Admission Diagnoses:   Intrauterine pregnancy at 41w0d  Spontaneous labor  Spontaneous rupture of membranes  Failed GCT, passed GTT         Discharge Diagnosis:   Intrauterine pregnancy at 41w0d  Spontaneous labor  Spontaneous rupture of membranes  Failed GCT, passed GTT  Arrest of descent  Category II FHT  Acute blood loss anemia secondary to surgical blood loss, asymptomatic            Procedures:   Procedure(s): Primary low transverse  section with two layer closure via Pfannenstiel skin incision  Epidural anesthesia                Medications Prior to Admission:     Medications Prior to Admission   Medication Sig Dispense Refill Last Dose     Omega-3 Fatty Acids (FISH OIL PO) Take by mouth daily   Past Week at Unknown time     Prenatal Vit-Fe Fumarate-FA (PRENATAL PO) Take by mouth daily   Past Week at Unknown time     probiotic CAPS    Past Week at Unknown time     VITAMIN D, CHOLECALCIFEROL, PO Take by mouth daily   Past Week at Unknown time     HERBALS    Taking     order for DME Electric breast pump 1 each 0              Discharge Medications:        Review of your medicines      START taking      Dose / Directions   acetaminophen 325 MG tablet  Commonly known as:  TYLENOL      Dose:  650 mg  Take 2 tablets (650 mg) by mouth every 4 hours as needed for mild pain  Quantity:  40 tablet  Refills:  1     ibuprofen 800 MG tablet  Commonly known as:  ADVIL/MOTRIN      Dose:  800 mg  Take 1 tablet (800 mg) by mouth every 6 hours as needed for other (cramping)  Quantity:  40 tablet  Refills:  1     oxyCODONE 5 MG tablet  Commonly known as:  ROXICODONE      Dose:  5 mg  Take 1 tablet (5 mg) by mouth every 4 hours as needed for  moderate to severe pain  Quantity:  5 tablet  Refills:  0     senna-docusate 8.6-50 MG tablet  Commonly known as:  SENOKOT-S/PERICOLACE      Dose:  1 tablet  Take 1 tablet by mouth 2 times daily as needed for constipation  Quantity:  100 tablet  Refills:  1        CONTINUE these medicines which have NOT CHANGED      Dose / Directions   FISH OIL PO      Take by mouth daily  Refills:  0     HERBALS      Refills:  0     order for DME  Used for:  Encounter for supervision of normal first pregnancy in third trimester      Electric breast pump  Quantity:  1 each  Refills:  0     PRENATAL PO      Take by mouth daily  Refills:  0     probiotic Caps      Refills:  0     VITAMIN D (CHOLECALCIFEROL) PO      Take by mouth daily  Refills:  0           Where to get your medicines      These medications were sent to [facility] [place], [place] - [address]  [address] [address]    Phone:  [phone]     acetaminophen 325 MG tablet    ibuprofen 800 MG tablet    senna-docusate 8.6-50 MG tablet     Some of these will need a paper prescription and others can be bought over the counter. Ask your nurse if you have questions.    Bring a paper prescription for each of these medications    oxyCODONE 5 MG tablet                 Consultations:   Anesthesia  Lactation          Brief Admission History:   Vianca Valle is a 33 year old female  who is 41w0d pregnant and being admitted for early labor. SROM with clear fluid at 1950 on 2019, GBS negative. Contractions began at 2300, becoming stronger around 0300. Now jared every 2-3 minutes, breathing through them. She is accompanied by her  Bartolo. Desires epidural for pain relief.      Intraoperative course   The procedure was uncomplicated.   mL.  See operative report for details.     Operative findings:   1. Single, viable male infant at 2247 hours on 2018. Apgars of 9 and 9 at one and five minutes.  Birth  weight: 3062g.  Fetal presentation: direct OP. Amniotic fluid: meconium stained    2. Arterial Cord pH 7.16 with base excess 8.2.    3. Placenta intact with 3 vessel cord.     4. Normal appearing uterus, fallopian tubes, ovaries.   5.  No intraabdominal adhesions.  No abdominal wall adhesions   6. Left extension of uterine incision upward within the lower uterine segment. No active segment involvement       Postpartum Course   The patient's hospital course was unremarkable.  She recovered as anticipated and experienced no post-operative complications. On discharge, her pain was well controlled. Vaginal bleeding is similar to peak menstrual flow.  Voiding without difficulty.  Ambulating well, tolerating a normal diet, and has flatus but no BM.  No fever or significant wound drainage.  Breastfeeding ok, difficult latch and considering pumping and feeding by bottle.  Infant is stable. She was discharged on post-partum day #3.    Post-partum hemoglobin:   Hemoglobin   Date Value Ref Range Status   01/21/2019 10.9 (L) 11.7 - 15.7 g/dL Final     Contraception: condoms  Rh positive, no Rhogam indicated  Rubella immune, no MMR indicated          Discharge Instructions and Follow-Up:   Discharge diet: Regular   Discharge activity: No lifting greater than 20 lbs, pushing, pulling, or other strenuous activity for 6 weeks. Pelvic rest for 6 weeks including no sexual intercourse, tampons, or douching. No driving until you can slam on the breaks without pain or while on narcotic pain medications.    Discharge follow-up: Follow up with primary OB for routine postpartum visit in 6 weeks     Wound care: Keep incision clean and dry           Discharge Disposition:   Discharged to home     DO [facility] Resident PGY-1    Staff MD Note    I evaluated the patient on the day of discharge.  We reviewed discharge instructions.  Patient stable for discharge.    Che Coronado MD

## 2019-01-21 NOTE — ANESTHESIA PROCEDURE NOTES
Peripheral Nerve Block Procedure Note    Staff:     Anesthesiologist:  Angel Orellana MD  Location: PACU  Procedure Start/Stop TImes:      1/21/2019 11:46 AM     1/21/2019 11:52 AM    patient identified, IV checked, site marked, risks and benefits discussed, informed consent, monitors and equipment checked, pre-op evaluation, at physician/surgeon's request and post-op pain management      Correct Patient: Yes      Correct Position: Yes      Correct Site: Yes      Correct Procedure: Yes      Correct Laterality:  Yes    Site Marked:  Yes  Procedure details:     Procedure:  TAP    Laterality:  Bilateral    Position:  Supine    Sterile Prep: chloraprep, patient draped, mask and sterile gloves      Needle:  Short bevel and insulated    Needle gauge:  21    Needle length (mm):  100    Ultrasound: Yes      Ultrasound used to identify targeted nerve, plexus, or vascular structure and placed a needle adjacent to it      Permanent Image entered into patiient's record      Abnormal pain on injection: No      Blood Aspirated: No      Paresthesias:  No    Bleeding at site: No      Bolus via:  Needle    Infusion Method:  Single Shot    Complications:  None  Assessment/Narrative:      Bilateral TAP block was performed under US guidance. A total of 20 ml of liposomal bupivacaine with 20 ml of saline and 20 ml of 0.25% bupivacaine with epinephrine 1:400k was injected.

## 2019-01-21 NOTE — OP NOTE
General acute hospital   OPERATIVE NOTE:  SECTION     Surgery Date:  2019  Surgeon(s): Dotty España MD  Assistants:  Alexandra Bianchi MD, PGY2    Preoperative Diagnoses:  -  at 41w0d   - Arrest of descent  - Category II FHT  - Failed GCT, passed GTT    Postoperative diagnoses:  - Same    Procedure performed:  Primary low transverse  section with two layer closure via Pfannenstiel skin incision    Anesthesia:  Epidural  Nish Blood Loss (mL): 993 mL  Fluid replacement: 1300 mL crystalloid.  Urine output: 100 mL clear urine at the end of the case   Specimens: Cord blood and gases  Complications: None      Operative findings:   1. Single, viable male infant at 2247 hours on 2018. Apgars of 9 and 9 at one and five minutes.  Birth weight: pending.  Fetal presentation: direct OP. Amniotic fluid: meconium stained    2. Arterial Cord pH 7.16 with base excess 8.2.    3. Placenta intact with 3 vessel cord.     4. Normal appearing uterus, fallopian tubes, ovaries.   5.  No intraabdominal adhesions.  No abdominal wall adhesions   6. Left extension of uterine incision upward within the lower uterine segment. No active segment involvement    Indication: Vianca Valle is a 33 year old, , who was admitted at 41w0d for early labor and SROM.  She progressed to complete with Pitocin augmentation at 1700. Fetus was noted to be OP and was manually rotated by Dr. España at 1830. Patient subsequently pushed for 3 hours with good effort, however had no descent past -1 station. Was also noted to have category II fetal heart tracing during the last hour of pushing with fetal tachycardia with a baseline in the 170's and intermittent variable decels. The OB team was consulted and primary  section was recommended. The risks, benefits, and alternatives of  delivery were explained and the patient agreed to proceed.     Procedure details:  After obtaining  informed consent, the patient was taken to the operating room. She received 2g Ancef and 500 mg Azithromycin IV for infection prophylaxis. She was placed in the dorsal supine position with a leftward tilt and prepped and draped in the usual sterile fashion.     Following test of adequate epidural anesthesia, the abdomen was entered through a Pfannenstiel skin incision. The skin incision was made sharply and carried through the subcutaneous tissue to the fascia.  Fascia was incised in the midline and extended laterally with the Song scissors.  The superior margin of the fascial incision was grasped with Kocher clamps and dissected from the underlying muscle with sharp and blunt dissection, which was then repeated at the lower margin of the fascial incision.  The muscle was  in the midline.      The peritoneum was entered bluntly and the opening extended by digital dissection and electrocautery with care to avoid the bladder. A bladder blade was placed. The lower segment of the uterus was opened sharply in a transverse fashion and extended with digital pressure. The infant's head was noted to be in the direct OP position deeply impacted in the pelvis. Attempts were made to break suction and elevate the head with assistance of a vaginal hand, however was unsuccessful. The infant's feet were then grasped and delivered from the hysterotomy. The remainder of infant's body was delivered via standard breech maneuvers. The cord was immediately doubly clamped and cut and the infant was handed off to the waiting NICU staff. A segment of the cord was cut and set sent for cord gases.     The placenta was expressed.  The uterus was exteriorized from the abdomen and cleared of all clots and debris.  The uterus was massaged and was noted to be boggy.  Pitocin was given through the running IV.  With vigorous massage as well as administration of pitocin, good uterine tone was achieved. The hysterotomy was repaired with  0-vicryl suture in a running locked fashion starting from each apex due to the upward extension of the hysterotomy. A 2nd layer of 0-monocryl was used to imbricate the incision and good hemostasis was achieved. The posterior cul-de-sac was suctioned and the uterus was returned to the abdomen.      The bilateral pericolic gutters were irrigated and suctioned.  The hysterotomy was again inspected and found to be hemostatic.  The abdominal wall was examined and also found to be hemostatic.  The fascia was closed with a running suture of 0-Vicryl.  Subcutaneous tissue was irrigated. Areas that were not hemostatic were controlled with cautery. The subcutaneous tissue was less than 3cm in depth and did not require reapproximation. The skin was closed with 4-0 monocryl. The patient tolerated the procedure well and was taken to the recovery room in stable condition. All sponge, needle and instrument counts were correct x2.     Dr. España was present for the entire procedure.     Alexandra Bianchi MD  Ob/Gyn PGY-2  01/20/19 11:38 PM       Staff:  I was scrubbed and present for entire case and agree w/ above note.    Dotty España MD

## 2019-01-21 NOTE — BRIEF OP NOTE
[facility]  Brief Operative Note    Date of Surgery: 2018    Preop Dx:    -  at 41w0d   - Arrest of descent  - Category II FHT  - Failed GCT, passed GTT    Postop Dx:     - Same as above    Procedure: Primary low transverse  section with two layer closure via pfannenstiel skin incision    Surgeon:  Dotty España MD    Assistants: Alexandra Bianchi MD PGY-2        Anesthesia: Epidural bolus    QBL:  993 cc    IVF:  1300 cc    UOP:  100 ml blood tinged urine    Drains:  Issa catheter    Specimen: Cord blood and gases    Complications: None apparent     Findings:   1. Single, viable male infant at 2247 hours on 2018. Apgars of 9 and 9 at one and five minutes.  Birth weight: pending.  Fetal presentation: direct OP. Amniotic fluid: meconium stained    2. Arterial Cord pH 7.16 with base excess 8.2.    3. Placenta intact with 3 vessel cord.     4. Normal appearing uterus, fallopian tubes, ovaries.   5.  No intraabdominal adhesions.  No abdominal wall adhesions   6. Left extension of uterine incision upward within the lower uterine segment. No active segment involvement    Disposition: Transferred in stable condition to the PACU    Alexandra Bianchi MD  Ob/Gyn PGY-2  19 11:38 PM

## 2019-01-21 NOTE — PROGRESS NOTES
Blood pressure 115/67, temperature 98.8  F (37.1  C), temperature source Oral, resp. rate 16, last menstrual period 04/08/2018, SpO2 98 %, not currently breastfeeding.  Patient Vitals for the past 24 hrs:   BP Temp Temp src Resp SpO2   01/20/19 1840 115/67 98.8  F (37.1  C) Oral 16 98 %   01/20/19 1736 128/75 98.9  F (37.2  C) Oral 16 98 %   01/20/19 1638 115/68 99.2  F (37.3  C) Oral 16 97 %   01/20/19 1520 131/86 98.9  F (37.2  C) Oral 16 97 %   01/20/19 1430 123/79 98.8  F (37.1  C) Oral 16 97 %   01/20/19 1315 115/65 -- -- 18 98 %   01/20/19 1310 -- 98.9  F (37.2  C) Oral -- --   01/20/19 1230 -- -- -- 18 98 %   01/20/19 1205 104/56 99.1  F (37.3  C) Oral 18 98 %   01/20/19 1115 113/68 98.7  F (37.1  C) Oral 18 97 %   01/20/19 1015 114/74 98.2  F (36.8  C) Oral -- 98 %   01/20/19 0940 -- -- -- -- 97 %   01/20/19 0910 104/65 97.7  F (36.5  C) Oral -- 97 %   01/20/19 0810 99/55 98.1  F (36.7  C) -- -- 97 %   01/20/19 0705 110/67 -- -- -- 98 %   01/20/19 0700 -- -- -- -- 99 %   01/20/19 0643 -- -- -- -- 99 %   01/20/19 0634 101/59 -- -- -- 99 %   01/20/19 0629 98/53 -- -- -- 99 %   01/20/19 0625 105/51 -- -- -- --   01/20/19 0619 101/55 -- -- 16 99 %   01/20/19 0615 103/60 -- -- 18 --   01/20/19 0610 95/52 -- -- -- --   01/20/19 0608 106/62 -- -- -- --   01/20/19 0605 105/63 -- -- -- --   01/20/19 0558 108/63 -- -- -- --   01/20/19 0556 118/61 -- -- -- --   01/20/19 0554 128/70 -- -- -- 100 %   01/20/19 0552 125/71 -- -- -- --   01/20/19 0550 120/75 -- -- -- --   01/20/19 0530 -- -- -- 22 --   01/20/19 0400 115/71 97.4  F (36.3  C) Oral 20 --     General appearance: comfortable  Not feeling pressure or urge to push  Complete/0 station at 1700 and OP confirmed by BSUS  Labored down x 1 hour with rebozo technique done  Pushed x 30 minutes and continued to be 0 station and LOP. Dr España in for internal manual rotation at 1930 and baby now OA. Pushing well with assistance in SF.    CONTACTIONS: moderate and every 2-3  minutes  Pitocin- 9 mu/min.,  Antibiotics- none  FETAL HEART TONES: continuous EFM- baseline 140 with moderate variability and variable deceleration with manual rotation, not repetative.  ROM: clear fluid  PELVIC EXAM:complete 0 station  # Pain Assessment:  Current Pain Score 2019   Patient currently in pain? mague Coley s pain level was assessed and she currently denies pain with epidural.      ASSESSMENT:  ==============  IUP @ 41w0d second stage and SROM x 24 hours   Fetal Heart Rate Tracing category two  GBS- negative  Patient Active Problem List   Diagnosis     Seasonal allergic rhinitis     Supervision of normal first pregnancy, [facility] CNM     Hematuria     Abnormal glucose affecting pregnancy- passed 3 hour     Labor and delivery, indication for care     PLAN:  ===========  Pushing  Close observation  Anticipate    Katie Denton, AMANDA CNM

## 2019-01-21 NOTE — PLAN OF CARE
Pt. Arrived to PACU via Cube Route @ 9217. Pitocin infusing to PIV. Temp 97.9. Denies pain. Had slight nausea, now gone. SCD's in place. Fundus firm, U/U, scant flow. Issa with cherry colored urine, improving since OR. Pt. Stable and bonding well with . Continue PACU cares.

## 2019-01-21 NOTE — ANESTHESIA CARE TRANSFER NOTE
Patient: Vianca Valle    Procedure(s):   SECTION    Diagnosis: Pregnancy  Diagnosis Additional Information: No value filed.    Anesthesia Type:   No value filed.     Note:  Airway :Room Air  Patient transferred to:PACU  Comments: .Anesthesia Care Transfer Note    Patient: Vianca Valle    Transferred to: PACU    Patient vital signs: stable    Airway: none    Monitors applied, VSS.  Patient awake and comfortable, breathing spontaneously.  Report given to RN with transfer of care.        Sandra Campos CRNA  2019  11:41 PM  Handoff Report: Identifed the Patient, Identified the Reponsible Provider, Reviewed the pertinent medical history, Discussed the surgical course, Reviewed Intra-OP anesthesia mangement and issues during anesthesia, Set expectations for post-procedure period and Allowed opportunity for questions and acknowledgement of understanding      Vitals: (Last set prior to Anesthesia Care Transfer)    CRNA VITALS  2019 2305 - 2019 2341      2019             Pulse:  121    SpO2:  99 %                Electronically Signed By: AMANDA De Leon CRNA  2019  11:41 PM

## 2019-01-21 NOTE — LACTATION NOTE
This note was copied from a baby's chart.  Consult for first time mom, needs some help with latch & difficult to keep him on. C/S delivery at 41w0d, baby boy Jules AGA @ 6# 12 oz, within 24 hours of birth. No significant medical history for mom, had elevated 1 hour GTT but 3 hour WNL.     Oral exam of infant and breast exam of mom WNL. Vianca has tiny bruise across the tip of left nipple, reports both are tender. Infant cueing just before visit, parents preparing to feed. Mom reports she was happy RN helped her to get extra milk out with hand expression but not so confident about latching. Education offered about positioning, anatomy of breast and infant mouth, ways to get and maintain deeper latch, signs of good latch, benefits of skin to skin, infant led frequency and length of feedings and hand expression. Offer and mom accept demo for latching, he had excellent latch with nutritive sucking and swallowing that parents were able to see and hear, but mom reports very slight pinching feel especially after she took her hand away from supporting breast. Tried improving latch and had Vianca re-latch to get deeper, used laid back position to help maintain. Mom was able to latch independently but some assist to get comfortable, deep latch. Show her how to use breast compresssions prn to help with milk transfer, Jules fed well and came off contented, declined second side. Had Vianca hand express afterwards, helped with technique and she was able to elicit many drops onto spoon. Taught both parents how to spoon feed results. Reviewed breastfeeding resource list along with extra community and online resources. They plan follow up at [facility], oriented to breastfeeding support they offer.    Plan: feed on cue, assist to latch as needed, goal of 8 to 12 feedings in 24 hours. Hand express and spoon feed results after feedings as able, especially if doesn't feed well.

## 2019-01-21 NOTE — PROGRESS NOTES
Post Partum Progress Note  PPD#1    Subjective:  She is resting comfortably in bed this morning. Pain is well controlled on current medication regimen. She is tolerating PO intake. Lochia present and is appropriate.  Issa catheter is in place. She has not ambulated since surgery, but denies dizziness.  She denies headache, changes in vision, nausea/vomiting, chest pain, shortness of breath, RUQ pain, or worsening edema.  Plans to breast feed.    Objective:  Vitals:    19 0200 19 0230 19 0330 19 0430   BP: 128/86 117/83 106/60 117/76   Pulse:       Resp: 16 16 16 16   Temp: 99  F (37.2  C) 98.9  F (37.2  C) 98  F (36.7  C) 98.4  F (36.9  C)   TempSrc: Oral Oral Oral Oral   SpO2: 98% 98% 98% 98%       General: NAD, resting comfortably  CV: Regular rate, well perfused.   Pulm: Normal respiratory effort.  Abd: Soft, appropriately tender, non-distended. Fundus is firm and below the umbilicus.    Incision: Bandage in place without shadowing  Ext: 1+ lower extremity edema bilaterally. No calf tenderness.    Assessment/Plan:  Vianca Valle is a 33 year old  female who is POD#1 s/p PLTCS for arrest of descent and category II FHT.    - Encourage routine post-operative goals including ambulation and incentive spirometry  - PNC: Rh pos. Rubella immune. No intervention indicated.  - Pain: controlled on oral medications  - Heme: Hgb 13.6>>AM Hgb pending.  If <10 will discharge home with iron.  - GI: continue anti-emetics and stool softeners as needed.  - : Issa in place, plan to remove this AM.  - Infant: Stable in room  - Feeding: Plans on breast feeding.  - BC: Did not discuss this morning    Discharge to home on POD#2-3    Alexandra Bianchi MD  Obstetrics and Gyncology, PGY-2      [facility] staff:  Appreciate note by Dr. Bianchi.  I have seen and examined the patient without the resident. I have reviewed, edited, and agree with the note.      Coreen Mc,  MD  1/21/2019

## 2019-01-21 NOTE — PROGRESS NOTES
OB Consult Note    Asked by CNM to evaluate patient. Has been complete since 170. Was given 1 hour to labor down and has now been pushing for >3 hours with good effort, however has had no descent past -1 station despite manual rotation to OA position by Dr. España at 1930. Has had category II FHT x 1 hour with fetal tachycardia (baseline 170's) and intermittent variable decels. Discussed recommendation for primary  section due to category II FHT and arrest of descent. Discussed risks of surgery including bleeding necessitating blood transfusion, infection, and damage to surrounding structures including bladder, bowel, blood vessels, nerves, fallopian tubes, ovaries, and baby. Written consent obtained. Will plan to proceed urgently within the next 30 minutes.     Alexandra Bianchi MD  Ob/Gyn PGY-2  19 9:53 PM

## 2019-01-21 NOTE — PLAN OF CARE
Data: Vianca Valle transferred to 7143 via PACU cart at 0150. Baby transferred via parent's arms.  Action: Receiving unit notified of transfer: Yes. Patient and family notified of room change. Report given to JUDD Fragoso at 0120. Belongings sent to receiving unit. Accompanied by Registered Nurse. Oriented patient to surroundings. Call light within reach. ID bands double-checked with receiving RN.  Response: Patient tolerated transfer and is stable.

## 2019-01-21 NOTE — PLAN OF CARE
Pitocin titrated per orders. Pt repositioned frequently using peanut ball. Complete at 1655. Practice pushed 3041-0443, then rebozo and labored down. Began pushing again at 1900 (baby MILTON per CNM). Dr. España manually rotated baby at 1930 - successful. Pushed continuously from 0278-2378 - no change in fetal station. FHR tachycardic with minimal variability and late declarations, meconium present maternal temperature increasing (99.9), and maternal fatigue. Dr. España assessed pt and recommended a . Pt agreed and was consented. Prepped and brought back to the OR at 2219. Delivered at 2247, NICU present. Report given to JUDD Rehman

## 2019-01-22 PROCEDURE — 12000001 ZZH R&B MED SURG/OB UMMC

## 2019-01-22 PROCEDURE — 25000132 ZZH RX MED GY IP 250 OP 250 PS 637: Performed by: STUDENT IN AN ORGANIZED HEALTH CARE EDUCATION/TRAINING PROGRAM

## 2019-01-22 RX ORDER — IBUPROFEN 800 MG/1
800 TABLET, FILM COATED ORAL EVERY 6 HOURS PRN
Qty: 40 TABLET | Refills: 1 | Status: SHIPPED | OUTPATIENT
Start: 2019-01-22 | End: 2019-03-04

## 2019-01-22 RX ORDER — ACETAMINOPHEN 325 MG/1
650 TABLET ORAL EVERY 4 HOURS PRN
Qty: 40 TABLET | Refills: 1 | Status: SHIPPED | OUTPATIENT
Start: 2019-01-23 | End: 2019-03-04

## 2019-01-22 RX ORDER — AMOXICILLIN 250 MG
1 CAPSULE ORAL 2 TIMES DAILY PRN
Qty: 100 TABLET | Refills: 1 | Status: SHIPPED | OUTPATIENT
Start: 2019-01-22 | End: 2019-03-04

## 2019-01-22 RX ADMIN — SIMETHICONE CHEW TAB 80 MG 80 MG: 80 TABLET ORAL at 20:07

## 2019-01-22 RX ADMIN — ACETAMINOPHEN 975 MG: 325 TABLET, FILM COATED ORAL at 02:51

## 2019-01-22 RX ADMIN — ACETAMINOPHEN 975 MG: 325 TABLET, FILM COATED ORAL at 20:00

## 2019-01-22 RX ADMIN — IBUPROFEN 800 MG: 800 TABLET, FILM COATED ORAL at 02:52

## 2019-01-22 RX ADMIN — OXYCODONE HYDROCHLORIDE 5 MG: 5 TABLET ORAL at 00:22

## 2019-01-22 RX ADMIN — ACETAMINOPHEN 975 MG: 325 TABLET, FILM COATED ORAL at 11:27

## 2019-01-22 RX ADMIN — IBUPROFEN 800 MG: 800 TABLET, FILM COATED ORAL at 08:32

## 2019-01-22 RX ADMIN — SIMETHICONE CHEW TAB 80 MG 80 MG: 80 TABLET ORAL at 00:22

## 2019-01-22 RX ADMIN — SENNOSIDES AND DOCUSATE SODIUM 2 TABLET: 8.6; 5 TABLET ORAL at 20:00

## 2019-01-22 RX ADMIN — SENNOSIDES AND DOCUSATE SODIUM 2 TABLET: 8.6; 5 TABLET ORAL at 08:32

## 2019-01-22 RX ADMIN — IBUPROFEN 800 MG: 800 TABLET, FILM COATED ORAL at 14:25

## 2019-01-22 RX ADMIN — IBUPROFEN 800 MG: 800 TABLET, FILM COATED ORAL at 19:59

## 2019-01-22 NOTE — PLAN OF CARE
VSS. Postpartum assessment WDL. Patient voiding independently after catheter removal. Pain well controlled with tylenol and ibuprofen. Patient using nipple shield on left side. Minimal assistance with breastfeeding.

## 2019-01-22 NOTE — LACTATION NOTE
"This note was copied from a baby's chart.  Follow Up Consult: See 19 Lactation note for full history    Maternal Assessment: Vianca has bilateral bruised nipples. She has been using hydrogels, a nipple shield and a deeper, asymmetric latch.     Infant Assessment: Baby Jules has been fussy and cluster feeding. He has not had a wet diaper in >12 hours or a stool in >24 hours. When I entered the room he was crying and had a very \"dry\" sounding cry. His mouth was dry.  His weight loss at 24 hours of age was -5.1% of his birthweight.  When sucking, he has an uncoordinated suck and does some chomping. He was able to produce a coordinated suck with his tongue down and forward during finger feeding.     Feeding Assessment:  Vianca shares that the latch has been \"pinching\" and that Jules has been coming off the breast frequently.  We attempted to latch him but he was too upset and crying to latch. Due to low output, cluster feeding, crying and dry mucous membranes, the decision was made to supplement with donor milk.  We attempted to supplement at the breast using a feeding tube, syringe and nipple shield, but Jules was too frantic and upset to latch. When crying he appears to have a tightened lingual frenulum.  He was finger fed 10ml of pasteurized donor milk and tolerated the supplement volume.     Education: satiety/hunger cues, pasteurized donor milk, expected  output, signs breastfeeding is going well, supplement volumes, supplement methods and hand expression/pumping recommendations.     Plan: Continue breastfeeding on demand with RN support as needed with a goal of 8-12 feedings per day. Continue hand expression after each feeding. After next feeding, initiate pumping with the [employer] program and encourage pumping, in addition to hand expression, after at least every other feeding.    Continue supplementing with mother's expressed milk and/or donor milk as needed based on infant hunger/satiety " cues. Attempt to supplement at the breast. If not tolerated by infant, finger feeding supplement.     Prior to discharge, re-evaluate frenulum/tongue function (when infant isn't fussy and frantic) and review outpatient feeding plan and follow up recommendations.

## 2019-01-22 NOTE — PROGRESS NOTES
[facility]   Post-partum Note    Name:  Vianca Valle  MRN: [id number]    S: Patient is tired this morning.  Pain is controlled.  Denies dizziness, chest pain, SOB, nausea or vomiting. Tolerating regular diet without nausea or vomiting.  Ambulating without dizziness.  Spontaneously voiding. Reports flatus.  Lochia is less than menses.  breast feeding.  Still undecided for postpartum contraception.     O:   Patient Vitals for the past 24 hrs:   BP Temp Temp src Pulse Heart Rate Resp SpO2   19 0014 112/76 98.4  F (36.9  C) Oral 90 -- 16 95 %   19 2014 118/71 98.1  F (36.7  C) Oral -- 85 16 95 %   19 1630 116/71 98.4  F (36.9  C) Oral -- 84 16 99 %   19 1030 129/90 98.3  F (36.8  C) Oral -- 89 16 99 %     Gen:  Resting comfortably, NAD  CV:  Well perfused  Pulm:  No increased work of breathing  Abd:  Soft, appropriately ttp, non-distended.Fundus below umbilicus, firm and non-tender.  Incision: c/d/i no surrounding redness or erythema  Ext:  non-tender, 1+ LE edema b/l    I/O last 3 completed shifts:  In: 400 [I.V.:400]  Out: 3695 [Urine:2600; Blood:1095]    Hgb:   Hemoglobin   Date Value Ref Range Status   2019 10.9 (L) 11.7 - 15.7 g/dL Final       Assessment/Plan:  Vianca Valle is a 33 year old  female who is POD#2 s/p PLTCS for arrest of descent and category II FHT.     - Encourage routine post-operative goals including ambulation and incentive spirometry  - PNC: Rh pos. Rubella immune. No intervention indicated.  - Pain: controlled on oral medications  - Heme: Hgb 13.6>>10.9. vitals are reassuring and she is asymptomatic from acute blood loss anemia.   - GI: continue anti-emetics and stool softeners as needed.  - : spontaneously voiding.   - Infant: Stable in room  - Feeding: Plans on breast feeding.  - BC: undecided     Discharge to home on POD#3    Luci Candelario MD PhD  Ob/Gyn PGY-3  2019 6:40 AM    OB/GYN Staff -- Pt  seen and examined by me. Agree with note as above.  MD Morro

## 2019-01-22 NOTE — PLAN OF CARE
VSS. Pain well controlled with Tylenol, Ibuprofen, Oxycodone and TAP block. Pt breastfeeding and hand expressing colostrum for infant. Breastfeeding with assistance for latch and positioning. Pt tolerating regular diet. Good fluid intake. Issa catheter removed around 1630. Pt ambulating independently. Due to void soon. Postpartum checks WNL. Pt bonding well with infant. Spouse, Bartolo, present and attentive at bedside.

## 2019-01-23 VITALS
OXYGEN SATURATION: 99 % | HEART RATE: 82 BPM | SYSTOLIC BLOOD PRESSURE: 119 MMHG | RESPIRATION RATE: 18 BRPM | DIASTOLIC BLOOD PRESSURE: 86 MMHG | TEMPERATURE: 97.8 F

## 2019-01-23 PROBLEM — Z34.90 TERM PREGNANCY: Status: ACTIVE | Noted: 2019-01-23

## 2019-01-23 PROCEDURE — 25000132 ZZH RX MED GY IP 250 OP 250 PS 637: Performed by: STUDENT IN AN ORGANIZED HEALTH CARE EDUCATION/TRAINING PROGRAM

## 2019-01-23 RX ORDER — OXYCODONE HYDROCHLORIDE 5 MG/1
5 TABLET ORAL EVERY 4 HOURS PRN
Qty: 5 TABLET | Refills: 0 | Status: SHIPPED | OUTPATIENT
Start: 2019-01-23 | End: 2019-03-04

## 2019-01-23 RX ADMIN — ACETAMINOPHEN 650 MG: 325 TABLET, FILM COATED ORAL at 19:24

## 2019-01-23 RX ADMIN — SIMETHICONE CHEW TAB 80 MG 80 MG: 80 TABLET ORAL at 08:24

## 2019-01-23 RX ADMIN — SENNOSIDES AND DOCUSATE SODIUM 2 TABLET: 8.6; 5 TABLET ORAL at 08:24

## 2019-01-23 RX ADMIN — ACETAMINOPHEN 975 MG: 325 TABLET, FILM COATED ORAL at 05:17

## 2019-01-23 RX ADMIN — IBUPROFEN 800 MG: 800 TABLET, FILM COATED ORAL at 19:24

## 2019-01-23 RX ADMIN — IBUPROFEN 800 MG: 800 TABLET, FILM COATED ORAL at 05:18

## 2019-01-23 RX ADMIN — ACETAMINOPHEN 975 MG: 325 TABLET, FILM COATED ORAL at 14:22

## 2019-01-23 RX ADMIN — HYDROCORTISONE: 25 CREAM TOPICAL at 08:24

## 2019-01-23 NOTE — PLAN OF CARE
VSS. CMS/neuros intact. Mood is flat but pt is accepting and cooperative with nursing cares. Encouraged watching assigned videos and to reach out to nursing staff for additional support  Postpartum assessment WDL. Some swelling in the perineal area. Lochia present, scant in ramu pad.  Bilateral nipple soreness present for which pt has nipple shields. Breast pump in use, also supplementing with donor milk. Met and discussed with Lactation consultant on supplementing baby's feeding and best practices.  Pain managed with scheduled Tylenol and PRN Ibuprofen. States pain is comfortably manageable.  Spouse present in room, supportive with cares and bonding well with baby. Skin-to-skin completed with mom and dad.  Able to make needs known. Continue plan of care.

## 2019-01-23 NOTE — LACTATION NOTE
This note was copied from a baby's chart.  Follow up visit, infant with no stool since the one at birth, now 59 hours old. 2 voids in first 24 hours, 1 in second 24 hours, none in the last 12 hours. Mom has bruised and sore nipples, infant has been difficult to console, frequent cues to feed and taking more donor milk with each feeding. Overnight they gave donor milk via finger feed, RN report mom exhausted & discouraged with infant crying so much at breast. Mom reports feeling better this morning after getting some sleep, had some questions about hand expressing versus pumping, what to use and how to give supplements when home.    Infant finishing finger feeding in dads arms upon LC visit, content and relaxed, pass flatus while feeding. Parents report he hadn't been burping, but large burp when demo in seated position, infant on their lap.     Oral exam while calm, Jules extends his tongue well beyond gumline while sucking and spontaneously lifts easily and sticks it out past lower lip while cueing. His suck is organized with good cupping around finger, good mechanics but not very strong. Dad reports he had very strong suck when hungry but had just finished 24 mL finger feeding prior to exam. Generous arch of palate but still feels WNL. His abdomen is soft, passing flatus and no emesis. Dad reports he's been much more content since started giving supplements.     Mom's nipples are short shafted, both bruised, right more than left. Right side is bifurcated, not as notable at rest but obvious with pumping. Vianca reports both have been quite tender, she's been hesitant to even hand express & using lanolin for comfort.     Stayed for support during pumping session, taught hands on pumping and helped with hand expression technique afterwards. Vianca excited about getting 7 mL out this time!     Education about options for donor milk after discharge if desired, but with their concern for cost also reassure formula is  ok for medical indication, use as bridge until her milk comes in too. Point out volume she got this am already show her milk is increasing. Left ascom number on board & ask parents to call with early cues. Plan to assess their use of shield, assist prn & teach SNS and cup with next feeding.    Plan: Skin to skin as much as possible. Feed Jules with earliest cues, use nipple shield well applied and assure latching deeply, beyond tip of shield onto breast. Please support parents in learning SNS @ breast as well as cup feeding today prior to discharge.    Addendum: Returned for feeding, Vianca able to apply 16 mm shield well onto nipple but latches Jules shallow, tip of shield visible going in and out of mouth. Worked on getting deeper, full tip in mouth and onto breast tissue beyond. Once latched deep, latch is more comfortable but she can feel the stretching of the nipple, not pain free. Encouraged to use rolled blanket to support head, keep him from sliding off. He fed very well, frequent nutrititve sucking and swallowing heard intermittently that both parents were able to hear also. He pulled away contented after first side but also latched and fed well on 2nd using shield. Demo SNS at breast, he tolerate well but when under shield allowed air moving as well and more difficult to maintain seal. Also demo cup feeding, dad was able to return demo and Jules took 10 mL for him via cup quite well. Discussed paced bottle feeding if supplements required longer term, but they do plan to continue with cup or finger feeding as needed for next few days, expecting mom's milk to come in next day or two and hopefully not need supplements beyond that.  Vianca will pump each time Jules gets a supplement but at least 4 times daily while using shield, hand express after pumping. They will follow up with [facility] for pediatric care and aware of breastfeeding support there also, they would refer for  appt. Prn & Vianca aware of  resource list, will make 1 week appt. For help weaning from shield and continued support with feedings & supply prn.

## 2019-01-23 NOTE — PLAN OF CARE
VSS. AFebrile. Up ad gregory. Voiding and passing gas. Showered. Tolerating regular diet. Breast feeding with nipple shield with some assist. Baby get frantic at the breast if doesn't get milk right away. SNS done at breast with donor to help with latch and breast feeding. FOB present and supportive.

## 2019-01-23 NOTE — PLAN OF CARE
VSS. Postpartum assessment WNL. Incision shows no s/sx of infection. Vianca reports some general abdominal discomfort - pain well controlled with ibuprofen and tylenol. She reported this AM that she feels like she might have hemorrhoids. Tucks provided. Requested hydrocortisone cream from pharmacy.     Vianca and her partner were very tired and appeared very defeated last night when feeding baby. Baby has been fussy and inconsolable at times - even after being supplement with large volumes of donor breast milk. Infant taken to nursery for a few hours overnight to give parents time to sleep. Vianca and her  appeared to be much more calm and optimistic this AM. Her nipples are scabbed bilaterally. She had been doing SNS with nipple shield at the breast, but baby is latching poorly on nipple shied and MOB is concerned about the air that the baby is sucking in with nipple shield (infant has not stooled for over 48 hours). Given the state of her nipples and the likelihood that the baby feeding with the nipple shield is not providing great stimulation (since baby is chomping and uncoordinated), she plans to pump while her  finger feeds donor milk this AM. Peds team to work-up baby this AM to evaluate why infant has not stooled and is difficult to console at times. Emotional support provided to Vianca and her .

## 2019-01-23 NOTE — PLAN OF CARE
VSS. AFebrile. Breast feeding better today, baby able to latch. Up ad gregory. VOiding and passing gas. No BM yet. Breast pump given to pt. C/o pain and medicated with relief.Discharge instructions and meds reviewed and given to pt. FOB here and supportive. All belongings sent with pt. Discharged with transport escort.

## 2019-01-23 NOTE — PROGRESS NOTES
Post Partum Progress Note  PPD#3    Subjective:  She is resting comfortably in bed this morning. She complains of some difficulty breastfeeding, but is working with lactation. She also complains of pain with her hemorrhoids. Pain is improving and well controlled on current medication regimen. She is tolerating PO intake. Lochia present and similar to menses.  She is voiding without difficulty. She has passed flatus and has not had a BM. She is ambulating without dizziness or difficulty.  She denies headache, changes in vision, nausea/vomiting, chest pain, shortness of breath, RUQ pain, or worsening edema.  Plans to breast feed.    Objective:  Vitals:    19 0833 19 1520 19 2000 19 0509   BP: 112/76 120/80 128/78 99/61   Pulse: 79 82     Resp: 18 16 18 18   Temp: 97.9  F (36.6  C) 98  F (36.7  C) 97.9  F (36.6  C) 98.1  F (36.7  C)   TempSrc: Oral Oral Oral Oral   SpO2:           General: NAD, resting comfortably  CV: Regular rate, well perfused.   Pulm: Normal respiratory effort.  Abd: Soft, non-tender, non-distended. Fundus is firm and 5 cm below the umbilicus.    Incision: incision is clean, dry, intact  Ext: trace lower extremity edema bilaterally. No calf tenderness.    Assessment/Plan:  Vianca Valle is a 33 year old  female who is POD#3 s/p PLTCS for arrest of descent.    - Encourage routine post-operative goals including ambulation and incentive spirometry  - PNC: Rh pos. Rubella immune. No intervention indicated.  - Pain: controlled on oral medications  - Heme: Hgb 13.6> >10.9.  - GI: continue anti-emetics and stool softeners as needed.  - : Issa out. Voiding spontaneously.  - Infant: Stable at bedside.  - Feeding: Plans on breast feeding.  - BC: Plans on condoms    Discharge to boarding status today due to baby need for continued admission    # Pain Assessment:  Current Pain Score 2019   Patient currently in pain? yes   Pain descriptors Aching;Discomfort   -  Vianca is experiencing pain due to c/section. Pain management was discussed and the plan was created in a collaborative fashion.  Vianca's response to the current recommendations: engaged  - Please see the plan for pain management as documented above    DO [facility] Resident PGY-1  [phone]  January 23, 2019 , 8:37 AM     Staff MD Note    I appreciate the note by Dr. Garner.  Any necessary changes have been made by me.  I evaluated the patient with the resident and agree with the assessment and plan.  Meeting post-operative goals.  Discussed discharge to boarding status and went through discharge instructions.  She feels ready to discharge medically.  Hoping baby will make progress with regards to BM.    Che Coronado MD

## 2019-01-24 DIAGNOSIS — N64.0 NIPPLE FISSURE: Primary | ICD-10-CM

## 2019-01-24 DIAGNOSIS — N64.4 NIPPLE PAIN: Primary | ICD-10-CM

## 2019-01-24 RX ORDER — MUPIROCIN CALCIUM 20 MG/G
CREAM TOPICAL 3 TIMES DAILY
Qty: 15 G | Refills: 0 | Status: SHIPPED | OUTPATIENT
Start: 2019-01-24 | End: 2019-03-05

## 2019-01-24 RX ORDER — MUPIROCIN 20 MG/G
OINTMENT TOPICAL
Qty: 22 G | Refills: 1 | Status: SHIPPED | OUTPATIENT
Start: 2019-01-24 | End: 2019-03-04

## 2019-01-27 ENCOUNTER — TELEPHONE (OUTPATIENT)
Dept: OBGYN | Facility: CLINIC | Age: 34
End: 2019-01-27

## 2019-01-27 NOTE — TELEPHONE ENCOUNTER
"Vianca called the midwife line this morning with concerns for hemorrhoids and a milk blister.     She was prescribed stool softeners at discharge from the hospital but the medication was not included with her other discharge medications. Since then her hemorrhoids have been painful. She has had multiple BMs since discharge from the hospital and reports they have been both hard and soft. She has not noticed any bleeding from her rectum. She has tried sitz baths with some relief. Offered to re-send the prescription for pickup or to buy OTC; Vianca elected to buy the medication OTC. Also discussed trying OTC products such as Preparation-H to help with discomfort, and increasing water and fiber consumption. Vianca also requested the number for billing so that she could call and work on getting reimbursed for the prescription that was not filled. Vianca verbalized understanding and agreement with this plan of care.    Her second concern was the formation of what she believes is a milk blister on one of her nipples. Getting a deep latch with Jules continues to be a challenge. She does not have any signs of infection (increased pain, redness, swelling, fever, etc.). After seeing Jules's pediatrician last week, she has been using a new medication she describes as \"like the lanolin but like it also has neosporin.\" She reports her nipple pain has decreased and her nipples seem to be healing with this medication. Discussed using warm packs for comfort and that the milk blister would likely burst on its own. Offered placing a note with the [facility] clinic RN to try to set up a lactation consult; Vianca reports she is getting good support from her pediatrician's office and can make an appointment for help with breastfeeding there. Declined further assistance getting in touch with a lactation at this time but agreed to call back if she changes her mind. Verbalized understanding and agreement with this plan of care.    Janie " ACOSTA Caldwell    I, Janie Caldwell, am serving as a scribe; to document services personally performed by Alycia Pham-AMANDA Silva, MELITA- based on data collection and the provider's statements to me.    Provider Disclosure:  I agree with above History, Review of Systems, Physical exam and Plan. I have reviewed the content of the documentation and have edited it as needed. I have personally performed the services documented here and the documentation accurately represents those services and the decisions I have made.  AMANDA Alicia CNM

## 2019-02-03 ENCOUNTER — DOCUMENTATION ONLY (OUTPATIENT)
Dept: CARE COORDINATION | Facility: CLINIC | Age: 34
End: 2019-02-03

## 2019-02-03 NOTE — PROGRESS NOTES
[facility] will be sharing updates with you on Maternal Child Health Referral requests for home care services.  This is for care coordination purposes and alert you to referral status.  We received the referral for  Vianca Valle; MRN [id number] and want to update you:    [facility] has made 2 attempts to contact patient by phone and text message over the last 4 days.   We have not had any response from patient.  Final message was left advising patient to follow up with Primary Care Providers for mom and baby.    Sincerely [facility]  Les Chadwick  [phone]

## 2019-03-01 ENCOUNTER — TELEPHONE (OUTPATIENT)
Dept: OBGYN | Facility: CLINIC | Age: 34
End: 2019-03-01

## 2019-03-01 NOTE — TELEPHONE ENCOUNTER
Returned call to Vianca. She is asking if she needs to schedule an annual as well as a her postpartum check. Discussed with patient that she is not due for Pap until next year and that annual isn't necessary with already scheduled postpartum visit.     Patient also wanted to discuss a possible infection of ear lobe where her earring hole is. She states she removed earrings for  and noticed a white area at the earring hole with some discharge. She has removed earrings and has been using Neosporin to area. Discussed to continue to monitor and to seek care urgently if she notes pain to increase, reddened area to spread, or if she develops fever or chills. Patient states understanding.

## 2019-03-01 NOTE — TELEPHONE ENCOUNTER
"----- Message from Ellyn Cain sent at 3/1/2019  9:15 AM CST -----  Regarding: Skin infection & is she due for annual?  Contact: [phone]  She's coming in Monday for post partum.  Is she due for an annual?  Also said she \"has skin infection near earring hole?\"  I think that's what she said on v/m.  "

## 2019-03-04 ENCOUNTER — OFFICE VISIT (OUTPATIENT)
Dept: OBGYN | Facility: CLINIC | Age: 34
End: 2019-03-04
Attending: ADVANCED PRACTICE MIDWIFE
Payer: COMMERCIAL

## 2019-03-04 VITALS
DIASTOLIC BLOOD PRESSURE: 72 MMHG | SYSTOLIC BLOOD PRESSURE: 105 MMHG | HEART RATE: 69 BPM | BODY MASS INDEX: 29.25 KG/M2 | HEIGHT: 63 IN | WEIGHT: 165.1 LBS

## 2019-03-04 DIAGNOSIS — Z30.011 ENCOUNTER FOR INITIAL PRESCRIPTION OF CONTRACEPTIVE PILLS: ICD-10-CM

## 2019-03-04 DIAGNOSIS — D22.9 BENIGN MOLE: ICD-10-CM

## 2019-03-04 RX ORDER — ACETAMINOPHEN AND CODEINE PHOSPHATE 120; 12 MG/5ML; MG/5ML
0.35 SOLUTION ORAL DAILY
Qty: 84 TABLET | Refills: 3 | Status: SHIPPED | OUTPATIENT
Start: 2019-03-04 | End: 2021-04-29

## 2019-03-04 RX ORDER — DOCUSATE SODIUM 100 MG/1
100 CAPSULE, LIQUID FILLED ORAL 2 TIMES DAILY
COMMUNITY
End: 2021-05-12

## 2019-03-04 ASSESSMENT — ANXIETY QUESTIONNAIRES
7. FEELING AFRAID AS IF SOMETHING AWFUL MIGHT HAPPEN: NOT AT ALL
GAD7 TOTAL SCORE: 0
3. WORRYING TOO MUCH ABOUT DIFFERENT THINGS: NOT AT ALL
1. FEELING NERVOUS, ANXIOUS, OR ON EDGE: NOT AT ALL
2. NOT BEING ABLE TO STOP OR CONTROL WORRYING: NOT AT ALL
5. BEING SO RESTLESS THAT IT IS HARD TO SIT STILL: NOT AT ALL
6. BECOMING EASILY ANNOYED OR IRRITABLE: NOT AT ALL

## 2019-03-04 ASSESSMENT — MIFFLIN-ST. JEOR: SCORE: 1413.02

## 2019-03-04 ASSESSMENT — PATIENT HEALTH QUESTIONNAIRE - PHQ9
SUM OF ALL RESPONSES TO PHQ QUESTIONS 1-9: 3
5. POOR APPETITE OR OVEREATING: NOT AT ALL

## 2019-03-04 NOTE — NURSING NOTE
SUBJECTIVE:   Vianca Valle is here for her 6-week postpartum checkup.     PHQ-9 score: 3  Hx of Abuse:  No    Delivery Date: 19.    Delivering provider:  Dotty España MD.    Type of delivery:  .     Delivery complications: None  Infant gender:  boy, weight 6 pounds 12 oz.  Feeding Method:  Bottlefed.  Complications reported with feeding:  none, infant thriving .    Bleeding:  None.  Duration:  6 weeks.  Menses resumed:  No  Bowel/Urinary problems:  No    Contraception Planned:  None -- is she planning pregnancy? no  She  has not had intercourse since delivery..

## 2019-03-04 NOTE — LETTER
"3/4/2019       RE: Vianca Valle  [address]  [place] [address]     Dear Colleague,    Thank you for referring your patient, Vianca Valle, to the [facility] at [facility]. Please see a copy of my visit note below.      Nursing Notes:   Susu Goel RN  3/4/2019 11:42 AM  Signed  SUBJECTIVE:   Vianca Valle is here for her 6-week postpartum checkup.     PHQ-9 score: 3  Hx of Abuse:  No    Delivery Date: 19.    Delivering provider:  Dotty España MD.    Type of delivery:  .     Delivery complications: None  Infant gender:  boy, weight 6 pounds 12 oz.  Feeding Method:  Bottlefed.  Complications reported with feeding:  none, infant thriving .    Bleeding:  None.  Duration:  6 weeks.  Menses resumed:  No  Bowel/Urinary problems:  No    Contraception Planned:  None -- is she planning pregnancy? no  She  has not had intercourse since delivery..         ================================================================  Vianca Valle is a 34 year old  s/p primary low transverse  section on 2019 for arrest of descent and category 2 fetal tracing. Pt was admitted for early labor and SROM; was augmented with pitocin. Pushed 3 hours, baby in OP position.  QBL 993ml. Baby boy, apgars 9, 9. Birth weight 6lb 12oz.    Patient reports she is recovering well. She is no longer having vaginal bleeding. Feels that the incision is healing well. Occasionally has some sensitivity over the incision with touch but denies any pain. Reports she has been able to increase activity.   She does report several concerns that she plans to see a different provider about, including\"  - Bilateral heel pain with ambulation - this has been present even during pregnancy.   - Some redness and discomfort where her earring piercing is in the left ear  - A mole that she would like evaluated    Baby boy, \"Jules,\" is healthy and " "gaining weight. She has had some trouble breastfeed and may work with a lactation consultant. States that initially baby had difficulty latching and her nipples became sore. Was using a nipple shield and finger feeding. She is now mostly pumping and bottlefeeding baby breast milk. Latches baby approximately once per day. She reports she is happy with this feeding plan.    Reports her mood is good. She feels well supported by spouse, family and friends. Denies any anxiety, depression, SI/HI or self harm. Feels good about birth experience- knows that she worked hard and did everything she could.     She is requesting a prescription for progesterone only pills for contraception.  Has not had intercourse since delivery.    Last pap 1/4/2017 NIL, negative HPV.    ROS: 10 point ROS neg other than the symptoms noted above in the HPI.   CONSTITUTIONAL: negative  RESPIRATORY: negative  CARDIOVASCULAR: negative  GI: negative  : negative  MUSCULO-SKELETAL: negative  SKIN: negative  NEUROLOGIC: negative  PSYCHIATRIC: negative    EXAM:  /72   Pulse 69   Ht 1.6 m (5' 3\")   Wt 74.9 kg (165 lb 1.6 oz)   LMP 04/08/2018 (Exact Date)   BMI 29.25 kg/m       General: healthy, alert and no distress  Psych: negative for sleep disturbance, anxiety, nervous breakdown, depression, thoughts of self-harm, thoughts of hurting someone else, agitation, hallucinations, sexual difficulties, marital problems, abusive relationship, excessive alcohol consumption and illegal drug usage  PHQ9= 3, GAD7= 0    Breasts:  Lactating, Nipples intact with no lesions, Non-tender and No S/S of yeast or mastitis  Abdomen: Benign, Soft, flat, non-tender, No masses, organomegaly and Diastasis less than 1-2 FB  Incision:  well healed and dry and intact   Vulva:  Normal genitalia and Bartholin's, Urethra, [place]'s normal  Vagina:  normal with good muscle tone and without discharge  Cervix:  pink, moist, closed, without lesion or CMT.    Uterus:  fully " involuted and non-tender    Adnexa:  Within normal limits and No masses, nodularity, tenderness  Recto-vaginal:   anus normal    Piercing hole on left ear mildly red with some crusting. Does not appear infected.     ASSESSMENT:   Encounter Diagnoses   Name Primary?     Benign mole      Encounter for initial prescription of contraceptive pills      Routine postpartum follow-up Yes      Normal postpartum exam after PTLCS  Pregnancy was complicated by: Arrest of descent, OP, cat 2     PLAN:  Orders Placed This Encounter   Procedures     DERMATOLOGY REFERRAL      Orders Placed This Encounter   Medications     docusate sodium (COLACE) 100 MG capsule     Sig: Take 100 mg by mouth 2 times daily     norethindrone (MICRONOR) 0.35 MG tablet     Sig: Take 1 tablet (0.35 mg) by mouth daily     Dispense:  84 tablet     Refill:  3        Postpartum education reviewed:  Signs and symptoms of postpartum depression/anxiety discussed and resources offered.  Discussed calcium intake, vitamins and supplements including Vitamin D. Continue prenatal vitamin.   Exercise, high fiber, low fat diet, increased fluid intake, kegel exercises, routine breast self-exam encouraged.     - Reviewed contraception options. Desires progesterone only pills.   Discussed importance of consistent use, taking at same time every day and using back up contraception if >3 hours later. Reviewed condoms use or abstinence x 2 weeks after initiation of pops. Discussed use of emergency contraception if needed.  Reviewed recommendations for healthy pregnancy spacing.    - Prescription sent to patient's preferred pharmacy.    - Recommended patient make appointment with Dr. Thompson or Dr. Ott for ear evaluation and heel pain.  - Recommended pt make appointment with dermatology. May schedule with Dr. Buck at [facility]. Also placed dermatology referral.    - Reviewed lactation resources.    - Next pap with hpv cotesting due 1/2022.     RTC in 1 year for annual  exam or prn.    Marialuisa Moura, APRN, CNM

## 2019-03-05 ENCOUNTER — OFFICE VISIT (OUTPATIENT)
Dept: INTERNAL MEDICINE | Facility: CLINIC | Age: 34
End: 2019-03-05
Attending: INTERNAL MEDICINE
Payer: COMMERCIAL

## 2019-03-05 VITALS
SYSTOLIC BLOOD PRESSURE: 106 MMHG | DIASTOLIC BLOOD PRESSURE: 81 MMHG | TEMPERATURE: 94.6 F | WEIGHT: 165 LBS | BODY MASS INDEX: 29.23 KG/M2 | HEART RATE: 90 BPM

## 2019-03-05 DIAGNOSIS — L08.9 LOCAL INFECTION OF SKIN AND SUBCUTANEOUS TISSUE: ICD-10-CM

## 2019-03-05 DIAGNOSIS — M72.2 BILATERAL PLANTAR FASCIITIS: Primary | ICD-10-CM

## 2019-03-05 PROCEDURE — G0463 HOSPITAL OUTPT CLINIC VISIT: HCPCS | Mod: ZF

## 2019-03-05 RX ORDER — MUPIROCIN 20 MG/G
OINTMENT TOPICAL 3 TIMES DAILY
Qty: 30 G | Refills: 3 | Status: SHIPPED | OUTPATIENT
Start: 2019-03-05 | End: 2019-03-21

## 2019-03-05 ASSESSMENT — PAIN SCALES - GENERAL: PAINLEVEL: NO PAIN (0)

## 2019-03-05 ASSESSMENT — ANXIETY QUESTIONNAIRES: GAD7 TOTAL SCORE: 0

## 2019-03-05 NOTE — LETTER
3/5/2019       RE: Vianca Valle  [address] [place] [address]     Dear Colleague,    Thank you for referring your patient, Vianca Valle, to the [facility]  at [facility]. Please see a copy of my visit note below.    HPI  Patient is here for evaluation of possible ear lobe infection. She reports that she had to take an earring out of her ear recently. She developed redness and pain about a day after she put them in. She report that she took the earring out on Thursday last week. She had some drainage coming from the ear lobe. Currently she is doing better.   Patient is also concerned about bilateral heel pain. She has noticed pain that became worse in the pregnancy. She was wearing insoles in pregnancy, however, that has not helped much.     Review of Systems     Constitutional:  Negative for fever and fatigue.   HENT:  Negative for ear pain and dry mouth.    Respiratory:   Negative for cough and dyspnea on exertion.    Cardiovascular:  Negative for chest pain, dyspnea on exertion and edema.   Gastrointestinal:  Negative for nausea, vomiting, abdominal pain, diarrhea, constipation and melena.   Musculoskeletal:  Negative for back pain, joint swelling and arthralgias.   Skin:  Negative for itching and rash.   Endo/Heme:  Negative for anemia, swollen glands and bruises/bleeds easily.   Psychiatric/Behavioral:  Negative for depression, decreased concentration, mood swings and panic attacks.    Endocrine:  Negative for altered temperature regulation, polyphagia, polydipsia, unwanted hair growth and change in facial hair.    Current Outpatient Medications   Medication     docusate sodium (COLACE) 100 MG capsule     HERBALS     Prenatal Vit-Fe Fumarate-FA (PRENATAL PO)     norethindrone (MICRONOR) 0.35 MG tablet     probiotic CAPS     VITAMIN D, CHOLECALCIFEROL, PO     No current facility-administered medications for this visit.       Vitals:    03/05/19 0840 03/05/19 0842 03/05/19 0843   BP: 109/73 108/70 106/81   Pulse: 91 90 90   Temp: 94.6  F (34.8  C)     TempSrc: Oral     Weight: 74.8 kg (165 lb)       Physical Exam   Constitutional: She appears well-developed and well-nourished.   HENT:   Head: Normocephalic and atraumatic.   Right Ear: External ear normal.   Mouth/Throat: Oropharynx is clear and moist.   Mild erythema - left ear lobe, around earring hole   Cardiovascular: Normal rate.   Musculoskeletal: She exhibits no edema.   Nursing note and vitals reviewed.    Assessment and Plan:  Vianca was seen today for recheck.    Diagnoses and all orders for this visit:    Bilateral plantar fasciitis. Discussed likely cause of patient's symptoms. Advised on home care program. Recommend evaluation with Podiatry. Patient is in agreement with the plan.   -     PODIATRY/FOOT & ANKLE SURGERY REFERRAL    Local infection of skin and subcutaneous tissue. Mild cellulitis, improving. Recommend to continue with topical care. Prescription for antibacterial ointment was sent to patient's pharmacy.   -     mupirocin (BACTROBAN) 2 % external ointment; Apply topically 3 times daily      Total time spent 30  minutes.  More than 50% of the time spent with Ms. Valle on counseling / coordinating her care    Abeba Thompson MD

## 2019-03-05 NOTE — PROGRESS NOTES
HPI  Patient is here for evaluation of possible ear lobe infection. She reports that she had to take an earring out of her ear recently. She developed redness and pain about a day after she put them in. She report that she took the earring out on Thursday last week. She had some drainage coming from the ear lobe. Currently she is doing better.   Patient is also concerned about bilateral heel pain. She has noticed pain that became worse in the pregnancy. She was wearing insoles in pregnancy, however, that has not helped much.     Review of Systems     Constitutional:  Negative for fever and fatigue.   HENT:  Negative for ear pain and dry mouth.    Respiratory:   Negative for cough and dyspnea on exertion.    Cardiovascular:  Negative for chest pain, dyspnea on exertion and edema.   Gastrointestinal:  Negative for nausea, vomiting, abdominal pain, diarrhea, constipation and melena.   Musculoskeletal:  Negative for back pain, joint swelling and arthralgias.   Skin:  Negative for itching and rash.   Endo/Heme:  Negative for anemia, swollen glands and bruises/bleeds easily.   Psychiatric/Behavioral:  Negative for depression, decreased concentration, mood swings and panic attacks.    Endocrine:  Negative for altered temperature regulation, polyphagia, polydipsia, unwanted hair growth and change in facial hair.    Current Outpatient Medications   Medication     docusate sodium (COLACE) 100 MG capsule     HERBALS     Prenatal Vit-Fe Fumarate-FA (PRENATAL PO)     norethindrone (MICRONOR) 0.35 MG tablet     probiotic CAPS     VITAMIN D, CHOLECALCIFEROL, PO     No current facility-administered medications for this visit.      Vitals:    03/05/19 0840 03/05/19 0842 03/05/19 0843   BP: 109/73 108/70 106/81   Pulse: 91 90 90   Temp: 94.6  F (34.8  C)     TempSrc: Oral     Weight: 74.8 kg (165 lb)       Physical Exam   Constitutional: She appears well-developed and well-nourished.   HENT:   Head: Normocephalic and atraumatic.    Right Ear: External ear normal.   Mouth/Throat: Oropharynx is clear and moist.   Mild erythema - left ear lobe, around earring hole   Cardiovascular: Normal rate.   Musculoskeletal: She exhibits no edema.   Nursing note and vitals reviewed.    Assessment and Plan:  Vianca was seen today for recheck.    Diagnoses and all orders for this visit:    Bilateral plantar fasciitis. Discussed likely cause of patient's symptoms. Advised on home care program. Recommend evaluation with Podiatry. Patient is in agreement with the plan.   -     PODIATRY/FOOT & ANKLE SURGERY REFERRAL    Local infection of skin and subcutaneous tissue. Mild cellulitis, improving. Recommend to continue with topical care. Prescription for antibacterial ointment was sent to patient's pharmacy.   -     mupirocin (BACTROBAN) 2 % external ointment; Apply topically 3 times daily      Total time spent 30  minutes.  More than 50% of the time spent with Ms. Valle on counseling / coordinating her care    Abeba Thompson MD

## 2019-03-05 NOTE — NURSING NOTE
Chief Complaint   Patient presents with     RECHECK     C/O possible left ear lobe infection and bilateral heel pain   Leanna Mello LPN

## 2019-03-11 ENCOUNTER — OFFICE VISIT (OUTPATIENT)
Dept: PODIATRY | Facility: CLINIC | Age: 34
End: 2019-03-11
Payer: COMMERCIAL

## 2019-03-11 VITALS
HEART RATE: 92 BPM | SYSTOLIC BLOOD PRESSURE: 114 MMHG | BODY MASS INDEX: 29.23 KG/M2 | WEIGHT: 165 LBS | DIASTOLIC BLOOD PRESSURE: 70 MMHG | HEIGHT: 63 IN

## 2019-03-11 DIAGNOSIS — M72.2 PLANTAR FASCIITIS: Primary | ICD-10-CM

## 2019-03-11 PROCEDURE — 99203 OFFICE O/P NEW LOW 30 MIN: CPT | Performed by: PODIATRIST

## 2019-03-11 ASSESSMENT — ENCOUNTER SYMPTOMS
NAUSEA: 0
INSOMNIA: 0
BRUISES/BLEEDS EASILY: 0
DYSPNEA ON EXERTION: 0
BACK PAIN: 0
POLYDIPSIA: 0
VOMITING: 0
ALTERED TEMPERATURE REGULATION: 0
NERVOUS/ANXIOUS: 0
COUGH: 0
ABDOMINAL PAIN: 0
SWOLLEN GLANDS: 0
CONSTIPATION: 0
FEVER: 0
ARTHRALGIAS: 0
DEPRESSION: 0
FATIGUE: 0
JOINT SWELLING: 0
DIARRHEA: 0
PANIC: 0
POLYPHAGIA: 0
DECREASED CONCENTRATION: 0

## 2019-03-11 ASSESSMENT — MIFFLIN-ST. JEOR: SCORE: 1412.57

## 2019-03-11 NOTE — PATIENT INSTRUCTIONS
Thank you for choosing [facility]!    Follow up as needed    DR. PEREZ'S CLINIC LOCATIONS     MONDAY  [place] TUESDAY & FRIDAY AM  [place]   [address] [facility]   [address]   [place] [address] [place] [address]   [phone]  [phone]  [phone]       WEDNESDAY  [place] SCHEDULE SURGERY: [phone]   [address] [place] [address] APPOINTMENTS: [phone]   [place] [address] BILLING QUESTIONS: [phone]   [phone]         PLANTAR FASCIITIS  Plantar fasciitis is often referred to as heel spurs or heel pain. Plantar fasciitis is a very common problem that affects people of all foot shapes, age, weight and activity level. Pain may be in the arch or on the weight-bearing surface of the heel. The pain may come on without injury or identifiable cause. Pain is generally present when first getting out of bed in the morning or up from a seated break.     CAUSES  The plantar fascia is a dense fibrous band of tissue that stretches across the bottom surface of the foot. The fascia helps support the foot muscles and arch. Plantar fasciitis is thought to be caused by mechanical strain or overload. Frequent walking without shoes or wearing unsupportive shoes is thought to cause structural overload and ultimately inflammation of the plantar fascia. Some people have heel spurs that can be seen on x-ray. The heel spur is actually a minor component of plantar fascitis and is largely ignored.       SELF TREATMENT   The easiest solution is to stop walking around your home without shoes. Plantar fasciitis is largely a shoe problem. Shoes are either not being worn often enough or your current shoes are inadequate for your weight, foot structure or activity level. The majority of shoes on the market today are not sufficient to resist development of plantar fasciitis or to promote healing. Assume that your current shoes are  inadequate and will need to be replaced. Even high quality shoes wear out with 6 months to one year of frequent use. Weight loss is another option. Losing ten pounds in the next two months may be enough to resolve the problem. Ice applied to the area of pain two to three times per day for ten minutes each session can be very helpful. Warm foot soaks in epsom salts can also relieve pain. This should continue until the problem resolves. Achilles tendon stretching is essential. Stretch multiple times daily to promote healing and to prevent recurrence in the future. Over all stretching of the body is helpful as well such as the calves, thighs and lower back. Normally when one area of the body is tight, other areas are too. Gentle Yoga can be good for this.     Over the counter topical anti inflammatories can be helpful such as biofreeze, bengay, salon pas, ect...  Oral ibuprofen or aleve is recommended as well to try to calm down inflammation.     Night splints can be helpful to gradually stretch the foot at night as a lot of pain is when you get up in the morning. Taking a towel or thera band and stretching the foot back multiple times before you get ou of bed can be beneficial as well.     MEDICAL TREATMENT  Medical treatments often include custom arch supports, cortisone injections, physical therapy, splints to be worn in bed, prescription medications and surgery. The home treatments listed above will be necessary regardless of these advanced medical treatments. Surgery is rarely needed but is very helpful in selected cases.     PROGNOSIS  Plantar fasciitis can last from one day to a lifetime. Some people get intermittent fascitis that is very short-lived. Others suffer daily for years. Excessive body weight, frequent bare foot walking, long hours on the feet, inadequate shoes, predisposing foot structures and excessive activity such as running are all potential issues that lead to chronic and/or recurring plantar  fascitis. Having plantar fasciitis means that you are forever prone to this problem and will require modification of some of the above factors. Most people seek treatment within one to four months. Healing usually requires a similar one to four month time frame. Healing time is relative to the amount of effort spent treating the problem.   Plantar fasciitis is highly recurrent. Risk factors often continue, including return to bare foot walking, inadequate shoes, excessive body weight, excessive activities, etc. Your life style and foot structure may predispose you to recurrent plantar fasciitis. A daily prevention regimen can be very helpful. Ongoing use of shoe inserts, careful attention to appropriate shoes, daily Achilles stretching, etc. may prevent recurrence. Prompt attention at the earliest warning signs of heel pain can resolve the problem in as short as a few days.     EXERCISES  Stair Exercise: Step on the stairs with the ball of your foot and hold your position for at least 15 seconds, then slowly step down with the heels of your foot. You can do this daily and as often as you want.   Picking the Towel: Sit comfortably and then pick the towel up with your toes. You can use any object other than a towel as long as the material can be soft and you can pick it up with your toes.  Rolling the Bottle: Use a small ball or frozen water bottle and then roll it around with your foot.   Flex the Toes: Sit comfortably and then flex your toes by pointing it towards the floor or towards your body. This will relax and flex your foot and exercise your plantar fascia, the calf, and the Achilles tendon. The inability of the foot to stretch often causes the bunching up of the plantar fascia area leading to the pain.  Calf/Achilles Stretching: Lay on you back and raise one foot, then point your toes towards the floor. See photo below:               Hold each stretch for 10 seconds. Stretch 10 times per set, three sets per  day. Morning, afternoon and evening. If your heel pain is very severe in the morning, consider doing the first set of stretches before you get out of bed.      OVER THE COUNTER INSERT RECOMMENDATIONS  SuperFeet   Sofsole Fit Spenco   Power Step   Walk-Fit Arch Cradles     Most of these can be found at your local [employer], [employer], or online.  **A good high quality over the counter insert should cost around $40-$50      [employer] LOCATIONS  [place]  [address] [facility]  [phone]   [address]  [phone] [place]  [address] [facility] [address]  [phone]   [place]  [address]  [phone]   [place]  [address] [facility]  [phone] [place]  [address]  [phone]   [place]  [address]  [phone]   [place]  [address] [place] [address]  [phone] [place]  [address]  [phone]           Body Mass Index (BMI)  Many things can cause foot and ankle problems. Foot structure, activity level, foot mechanics and injuries are common causes of pain.  One very important issue that often goes unmentioned, is body weight. Extra weight can cause increased stress on muscles, ligaments, bones and tendons.  Sometimes just a few extra pounds is all it takes to put one over her/his threshold. Without reducing that stress, it can be difficult to alleviate pain. Some people are uncomfortable addressing this issue, but we feel it is important for you to think about it. As Foot &  Ankle specialists, our job is addressing the lower extremity problem and possible causes. Regarding extra body weight, we encourage patients to discuss diet and weight management plans with their primary care doctors. It is this team approach that gives you the best opportunity for pain relief and getting you back on your feet.

## 2019-03-11 NOTE — PROGRESS NOTES
PATIENT HISTORY:  Vianca Valle is a 34 year old female who presents to clinic for b/l plantar heel pain, worse after rest.  Present for months.  Massage can help.  No other treatments.  6-8/10 pain.      I was requested to see this patient for this issue by Dr Abeba Caballero.    Review of Systems:  Patient denies fever, chills, rash, wound, stiffness, limping, numbness, weakness, heart burn, blood in stool, chest pain with activity, calf pain when walking, shortness of breath with activity, chronic cough, easy bleeding/bruising, swelling of ankles, excessive thirst, fatigue, depression, anxiety.       PAST MEDICAL HISTORY:   Past Medical History:   Diagnosis Date     Allergic rhinitis      Hematuria 2017     Ruptured ear drum, right 2016        PAST SURGICAL HISTORY:   Past Surgical History:   Procedure Laterality Date      SECTION N/A 2019    Procedure:  SECTION;  Surgeon: Dotty España MD;  Location:  [facility]D     TONSILLECTOMY      2002        MEDICATIONS:   Current Outpatient Medications:      docusate sodium (COLACE) 100 MG capsule, Take 100 mg by mouth 2 times daily, Disp: , Rfl:      HERBALS, , Disp: , Rfl:      mupirocin (BACTROBAN) 2 % external ointment, Apply topically 3 times daily, Disp: 30 g, Rfl: 3     norethindrone (MICRONOR) 0.35 MG tablet, Take 1 tablet (0.35 mg) by mouth daily, Disp: 84 tablet, Rfl: 3     Prenatal Vit-Fe Fumarate-FA (PRENATAL PO), Take by mouth daily, Disp: , Rfl:      probiotic CAPS, , Disp: , Rfl:      VITAMIN D, CHOLECALCIFEROL, PO, Take by mouth daily, Disp: , Rfl:      ALLERGIES:  No Known Allergies     SOCIAL HISTORY:   Social History     Socioeconomic History     Marital status:      Spouse name: Bartolo     Number of children: Not on file     Years of education: college     Highest education level: Not on file   Occupational History     Comment: [employer], FT   Social Needs     Financial resource strain: Not on file     Food  insecurity:     Worry: Not on file     Inability: Not on file     Transportation needs:     Medical: Not on file     Non-medical: Not on file   Tobacco Use     Smoking status: Former Smoker     Packs/day: 0.50     Years: 10.00     Pack years: 5.00     Types: Cigarettes     Start date: 6/1/2006     Smokeless tobacco: Never Used     Tobacco comment: 02/2018   Substance and Sexual Activity     Alcohol use: No     Alcohol/week: 0.6 - 1.8 oz     Drug use: No     Sexual activity: Yes     Partners: Male     Birth control/protection: None     Comment: monogamous relationship since May 2013   Lifestyle     Physical activity:     Days per week: Not on file     Minutes per session: Not on file     Stress: Not on file   Relationships     Social connections:     Talks on phone: Not on file     Gets together: Not on file     Attends Jehovah's witness service: Not on file     Active member of club or organization: Not on file     Attends meetings of clubs or organizations: Not on file     Relationship status: Not on file     Intimate partner violence:     Fear of current or ex partner: Not on file     Emotionally abused: Not on file     Physically abused: Not on file     Forced sexual activity: Not on file   Other Topics Concern     Not on file   Social History Narrative    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     How much exercise per week? Not much daily    How much calcium per day? 2-3 servings       How much caffeine per day? 1 cup coffee    How much vitamin D per day?   occ supplements    Do you/your family wear seatbelts?  Yes    Do you/your family use safety helmets? N/A    Do you/your family use sunscreen? Yes    Do you/your family keep firearms in the home? No    Do you/your family have a smoke detector(s)? Yes        Do you feel safe in your home? Yes    Has anyone ever touched you in an unwanted manner? No     Explain         December 9, 2014 Ravinder Kuhn LPN    Reviewed joyce de luna  "1-4-2017             FAMILY HISTORY:   Family History   Problem Relation Age of Onset     Thyroid Disease Maternal Grandmother      KATHY. Maternal Grandfather      Diabetes Paternal Grandmother         EXAM:Vitals: /70   Pulse 92   Ht 1.6 m (5' 3\")   Wt 74.8 kg (165 lb)   LMP 04/08/2018 (Exact Date)   BMI 29.23 kg/m    BMI= Body mass index is 29.23 kg/m .    General appearance: Patient is alert and fully cooperative with history & exam.  No sign of distress is noted during the visit.     Psychiatric: Affect is pleasant & appropriate.  Patient appears motivated to improve health.     Respiratory: Breathing is regular & unlabored while sitting.     HEENT: Hearing is intact to spoken word.  Speech is clear.  No gross evidence of visual impairment that would impact ambulation.     Dermatologic: Skin is intact to both feet without significant lesions, rash or abrasion.  No paronychia or evidence of soft tissue infection is noted.     Vascular: DP & PT pulses are intact & regular bilaterally.  No significant edema or varicosities noted.  CFT and skin temperature are normal to both lower extremities.     Neurologic: Lower extremity sensation is intact to light touch.  No evidence of weakness or contracture in the lower extremities.  No evidence of neuropathy.     Musculoskeletal: pain to plantar palpation of b/l heels at fascial insertions.  Patient is ambulatory without assistive device or brace.  No gross ankle deformity noted.  No foot or ankle joint effusion is noted.     ASSESSMENT: b/l plantar fasciitis     PLAN:  Reviewed patient's chart in epic.  Discussed condition and treatment options including pros and cons.    The potential causes and nature of plantar fasciitis were discussed with the patient.  We reviewed the natural history/prognosis of the condition and risks if left untreated.      We discussed possible causes of the condition as it relates to the patients specific situation.      Conservative " treatment options were reviewed:  appropriate shoes, avoidance of barefoot walking, inserts/orthoses, stretching, ice, massage, immobilization and NSAIDs.     We also reviewed the option of injection therapy if not improving.     After thorough discussion and answering all questions, the patient elected to try icing, stretching, superfeet.  F/u 1 month prn.          Fitz Johnson DPM, FACFAS    Weight management plan: Patient was referred to their PCP to discuss a diet and exercise plan.

## 2019-03-11 NOTE — LETTER
3/11/2019         RE: Vianca Valle  [address] [place] [address]        Dear Colleague,    Thank you for referring your patient, Vianca Valle, to the [facility]. Please see a copy of my visit note below.    PATIENT HISTORY:  Vianca Valle is a 34 year old female who presents to clinic for b/l plantar heel pain, worse after rest.  Present for months.  Massage can help.  No other treatments.  6-8/10 pain.      I was requested to see this patient for this issue by Dr Abeba Caballero.    Review of Systems:  Patient denies fever, chills, rash, wound, stiffness, limping, numbness, weakness, heart burn, blood in stool, chest pain with activity, calf pain when walking, shortness of breath with activity, chronic cough, easy bleeding/bruising, swelling of ankles, excessive thirst, fatigue, depression, anxiety.       PAST MEDICAL HISTORY:   Past Medical History:   Diagnosis Date     Allergic rhinitis      Hematuria 2017     Ruptured ear drum, right 2016        PAST SURGICAL HISTORY:   Past Surgical History:   Procedure Laterality Date      SECTION N/A 2019    Procedure:  SECTION;  Surgeon: Dotty España MD;  Location:  [facility]     TONSILLECTOMY      2002        MEDICATIONS:   Current Outpatient Medications:      docusate sodium (COLACE) 100 MG capsule, Take 100 mg by mouth 2 times daily, Disp: , Rfl:      HERBALS, , Disp: , Rfl:      mupirocin (BACTROBAN) 2 % external ointment, Apply topically 3 times daily, Disp: 30 g, Rfl: 3     norethindrone (MICRONOR) 0.35 MG tablet, Take 1 tablet (0.35 mg) by mouth daily, Disp: 84 tablet, Rfl: 3     Prenatal Vit-Fe Fumarate-FA (PRENATAL PO), Take by mouth daily, Disp: , Rfl:      probiotic CAPS, , Disp: , Rfl:      VITAMIN D, CHOLECALCIFEROL, PO, Take by mouth daily, Disp: , Rfl:      ALLERGIES:  No Known Allergies     SOCIAL HISTORY:   Social History     Socioeconomic History     Marital status:       Spouse name: Bartolo     Number of children: Not on file     Years of education: college     Highest education level: Not on file   Occupational History     Comment: [employer], FT   Social Needs     Financial resource strain: Not on file     Food insecurity:     Worry: Not on file     Inability: Not on file     Transportation needs:     Medical: Not on file     Non-medical: Not on file   Tobacco Use     Smoking status: Former Smoker     Packs/day: 0.50     Years: 10.00     Pack years: 5.00     Types: Cigarettes     Start date: 6/1/2006     Smokeless tobacco: Never Used     Tobacco comment: 02/2018   Substance and Sexual Activity     Alcohol use: No     Alcohol/week: 0.6 - 1.8 oz     Drug use: No     Sexual activity: Yes     Partners: Male     Birth control/protection: None     Comment: monogamous relationship since May 2013   Lifestyle     Physical activity:     Days per week: Not on file     Minutes per session: Not on file     Stress: Not on file   Relationships     Social connections:     Talks on phone: Not on file     Gets together: Not on file     Attends Confucianist service: Not on file     Active member of club or organization: Not on file     Attends meetings of clubs or organizations: Not on file     Relationship status: Not on file     Intimate partner violence:     Fear of current or ex partner: Not on file     Emotionally abused: Not on file     Physically abused: Not on file     Forced sexual activity: Not on file   Other Topics Concern     Not on file   Social History Narrative    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     How much exercise per week? Not much daily    How much calcium per day? 2-3 servings       How much caffeine per day? 1 cup coffee    How much vitamin D per day?   occ supplements    Do you/your family wear seatbelts?  Yes    Do you/your family use safety helmets? N/A    Do you/your family use sunscreen? Yes    Do you/your family keep  "firearms in the home? No    Do you/your family have a smoke detector(s)? Yes        Do you feel safe in your home? Yes    Has anyone ever touched you in an unwanted manner? No     Explain         December 9, 2014 Ravinder Kuhn LPN    Reviewed [email or web address] lpn 1-4-2017             FAMILY HISTORY:   Family History   Problem Relation Age of Onset     Thyroid Disease Maternal Grandmother      KATHY. Maternal Grandfather      Diabetes Paternal Grandmother         EXAM:Vitals: /70   Pulse 92   Ht 1.6 m (5' 3\")   Wt 74.8 kg (165 lb)   LMP 04/08/2018 (Exact Date)   BMI 29.23 kg/m     BMI= Body mass index is 29.23 kg/m .    General appearance: Patient is alert and fully cooperative with history & exam.  No sign of distress is noted during the visit.     Psychiatric: Affect is pleasant & appropriate.  Patient appears motivated to improve health.     Respiratory: Breathing is regular & unlabored while sitting.     HEENT: Hearing is intact to spoken word.  Speech is clear.  No gross evidence of visual impairment that would impact ambulation.     Dermatologic: Skin is intact to both feet without significant lesions, rash or abrasion.  No paronychia or evidence of soft tissue infection is noted.     Vascular: DP & PT pulses are intact & regular bilaterally.  No significant edema or varicosities noted.  CFT and skin temperature are normal to both lower extremities.     Neurologic: Lower extremity sensation is intact to light touch.  No evidence of weakness or contracture in the lower extremities.  No evidence of neuropathy.     Musculoskeletal: pain to plantar palpation of b/l heels at fascial insertions.  Patient is ambulatory without assistive device or brace.  No gross ankle deformity noted.  No foot or ankle joint effusion is noted.     ASSESSMENT: b/l plantar fasciitis     PLAN:  Reviewed patient's chart in epic.  Discussed condition and treatment options including pros and cons.    The potential causes and nature of " plantar fasciitis were discussed with the patient.  We reviewed the natural history/prognosis of the condition and risks if left untreated.      We discussed possible causes of the condition as it relates to the patients specific situation.      Conservative treatment options were reviewed:  appropriate shoes, avoidance of barefoot walking, inserts/orthoses, stretching, ice, massage, immobilization and NSAIDs.     We also reviewed the option of injection therapy if not improving.     After thorough discussion and answering all questions, the patient elected to try icing, stretching, superfeet.  F/u 1 month prn.          Fitz Johnson DPM, FACFAS    Weight management plan: Patient was referred to their PCP to discuss a diet and exercise plan.      Again, thank you for allowing me to participate in the care of your patient.        Sincerely,        Fitz Johnson DPM

## 2019-03-12 NOTE — PROGRESS NOTES
"  Nursing Notes:   Susu Goel, RN  3/4/2019 11:42 AM  Signed  SUBJECTIVE:   Vianca Valle is here for her 6-week postpartum checkup.     PHQ-9 score: 3  Hx of Abuse:  No    Delivery Date: 19.    Delivering provider:  Dotty España MD.    Type of delivery:  .     Delivery complications: None  Infant gender:  boy, weight 6 pounds 12 oz.  Feeding Method:  Bottlefed.  Complications reported with feeding:  none, infant thriving .    Bleeding:  None.  Duration:  6 weeks.  Menses resumed:  No  Bowel/Urinary problems:  No    Contraception Planned:  None -- is she planning pregnancy? no  She  has not had intercourse since delivery..         ================================================================  Vianca Valle is a 34 year old  s/p primary low transverse  section on 2019 for arrest of descent and category 2 fetal tracing. Pt was admitted for early labor and SROM; was augmented with pitocin. Pushed 3 hours, baby in OP position.  QBL 993ml. Baby boy, apgars 9, 9. Birth weight 6lb 12oz.    Patient reports she is recovering well. She is no longer having vaginal bleeding. Feels that the incision is healing well. Occasionally has some sensitivity over the incision with touch but denies any pain. Reports she has been able to increase activity.   She does report several concerns that she plans to see a different provider about, including\"  - Bilateral heel pain with ambulation - this has been present even during pregnancy.   - Some redness and discomfort where her earring piercing is in the left ear  - A mole that she would like evaluated    Baby boy, \"Jules,\" is healthy and gaining weight. She has had some trouble breastfeed and may work with a lactation consultant. States that initially baby had difficulty latching and her nipples became sore. Was using a nipple shield and finger feeding. She is now mostly pumping and bottlefeeding baby breast milk. Latches baby " "approximately once per day. She reports she is happy with this feeding plan.    Reports her mood is good. She feels well supported by spouse, family and friends. Denies any anxiety, depression, SI/HI or self harm. Feels good about birth experience- knows that she worked hard and did everything she could.     She is requesting a prescription for progesterone only pills for contraception.  Has not had intercourse since delivery.    Last pap 1/4/2017 NIL, negative HPV.    ROS: 10 point ROS neg other than the symptoms noted above in the HPI.   CONSTITUTIONAL: negative  RESPIRATORY: negative  CARDIOVASCULAR: negative  GI: negative  : negative  MUSCULO-SKELETAL: negative  SKIN: negative  NEUROLOGIC: negative  PSYCHIATRIC: negative    EXAM:  /72   Pulse 69   Ht 1.6 m (5' 3\")   Wt 74.9 kg (165 lb 1.6 oz)   LMP 04/08/2018 (Exact Date)   BMI 29.25 kg/m      General: healthy, alert and no distress  Psych: negative for sleep disturbance, anxiety, nervous breakdown, depression, thoughts of self-harm, thoughts of hurting someone else, agitation, hallucinations, sexual difficulties, marital problems, abusive relationship, excessive alcohol consumption and illegal drug usage  PHQ9= 3, GAD7= 0    Breasts:  Lactating, Nipples intact with no lesions, Non-tender and No S/S of yeast or mastitis  Abdomen: Benign, Soft, flat, non-tender, No masses, organomegaly and Diastasis less than 1-2 FB  Incision:  well healed and dry and intact   Vulva:  Normal genitalia and Bartholin's, Urethra, Jemison's normal  Vagina:  normal with good muscle tone and without discharge  Cervix:  pink, moist, closed, without lesion or CMT.    Uterus:  fully involuted and non-tender    Adnexa:  Within normal limits and No masses, nodularity, tenderness  Recto-vaginal:   anus normal    Piercing hole on left ear mildly red with some crusting. Does not appear infected.     ASSESSMENT:   Encounter Diagnoses   Name Primary?     Benign mole      Encounter for " initial prescription of contraceptive pills      Routine postpartum follow-up Yes      Normal postpartum exam after PTLCS  Pregnancy was complicated by: Arrest of descent, OP, cat 2     PLAN:  Orders Placed This Encounter   Procedures     DERMATOLOGY REFERRAL      Orders Placed This Encounter   Medications     docusate sodium (COLACE) 100 MG capsule     Sig: Take 100 mg by mouth 2 times daily     norethindrone (MICRONOR) 0.35 MG tablet     Sig: Take 1 tablet (0.35 mg) by mouth daily     Dispense:  84 tablet     Refill:  3        Postpartum education reviewed:  Signs and symptoms of postpartum depression/anxiety discussed and resources offered.  Discussed calcium intake, vitamins and supplements including Vitamin D. Continue prenatal vitamin.   Exercise, high fiber, low fat diet, increased fluid intake, kegel exercises, routine breast self-exam encouraged.     - Reviewed contraception options. Desires progesterone only pills.   Discussed importance of consistent use, taking at same time every day and using back up contraception if >3 hours later. Reviewed condoms use or abstinence x 2 weeks after initiation of pops. Discussed use of emergency contraception if needed.  Reviewed recommendations for healthy pregnancy spacing.    - Prescription sent to patient's preferred pharmacy.    - Recommended patient make appointment with Dr. Thompson or Dr. Ott for ear evaluation and heel pain.  - Recommended pt make appointment with dermatology. May schedule with Dr. Buck at [facility]. Also placed dermatology referral.    - Reviewed lactation resources.    - Next pap with hpv cotesting due 1/2022.     RTC in 1 year for annual exam or prn.    AMANDA Landa, MELITA

## 2019-03-13 PROBLEM — Z34.90 TERM PREGNANCY: Status: RESOLVED | Noted: 2019-01-23 | Resolved: 2019-03-13

## 2019-03-13 PROBLEM — Z34.00 SUPERVISION OF NORMAL FIRST PREGNANCY: Status: RESOLVED | Noted: 2018-06-05 | Resolved: 2019-03-13

## 2019-03-13 PROBLEM — R31.9 HEMATURIA: Status: RESOLVED | Noted: 2018-06-05 | Resolved: 2019-03-13

## 2019-03-13 PROBLEM — O99.810 ABNORMAL GLUCOSE AFFECTING PREGNANCY: Status: RESOLVED | Noted: 2018-10-18 | Resolved: 2019-03-13

## 2019-03-19 ENCOUNTER — OFFICE VISIT (OUTPATIENT)
Dept: DERMATOLOGY | Facility: CLINIC | Age: 34
End: 2019-03-19
Payer: COMMERCIAL

## 2019-03-19 DIAGNOSIS — L30.9 DERMATITIS: ICD-10-CM

## 2019-03-19 DIAGNOSIS — D22.9 MULTIPLE BENIGN MELANOCYTIC NEVI: Primary | ICD-10-CM

## 2019-03-19 ASSESSMENT — PAIN SCALES - GENERAL: PAINLEVEL: NO PAIN (0)

## 2019-03-19 NOTE — LETTER
3/19/2019       RE: Vianca Valle  [address]  [place] [address]     Dear Colleague,    Thank you for referring your patient, Vianca Valle, to the [facility] DERMATOLOGY at [facility]. Please see a copy of my visit note below.    [facility] Dermatology Note      Dermatology Problem List:  1. Multiple benign nevi    Encounter Date: Mar 19, 2019    CC:   Chief Complaint   Patient presents with     Skin Check     Vianca is here today for a skin check. Areas of concern include a pigmented lesion on buttocks and right upper arm.        History of Present Illness:  Ms. Vianca Valle is a 34 year old female who presents as a new patient for a possibly new mole. The pt notes she has always had numerous nevi. She has never had skin cancer, nor has anyone in her family. She was recently pregnant, and she does not think any of her nevi changed. Today, she would like us to look at two nevi. First, on her right upper arm is think and more than one color. She has also noticed a mole on her right buttock crease. She states it does not itch, hurt, or bleed. She is unsure how long it has been present for. Finally, she notices she will sometiems develop eczema on her hands. She treats this with Vanicream, which she believes helps. Otherwise, the pt states she feels well, and she denies any other general or skin-specific complaints at this time.    Past Medical History:   Patient Active Problem List   Diagnosis     Seasonal allergic rhinitis     S/P  section     Past Medical History:   Diagnosis Date     Allergic rhinitis      Hematuria 2017     Ruptured ear drum, right 2016     Past Surgical History:   Procedure Laterality Date      SECTION N/A 2019    Procedure:  SECTION;  Surgeon: Dotty España MD;  Location:  L+D     TONSILLECTOMY      2002       Social History:  Patient reports that she has quit  smoking. Her smoking use included cigarettes. She started smoking about 12 years ago. She has a 5.00 pack-year smoking history. she has never used smokeless tobacco. She reports that she does not drink alcohol or use drugs.    Family History:  Family History   Problem Relation Age of Onset     Thyroid Disease Maternal Grandmother      KATHY. Maternal Grandfather      Diabetes Paternal Grandmother    Reviewed    Medications:  Current Outpatient Medications   Medication Sig Dispense Refill     HERBALS        norethindrone (MICRONOR) 0.35 MG tablet Take 1 tablet (0.35 mg) by mouth daily 84 tablet 3     Prenatal Vit-Fe Fumarate-FA (PRENATAL PO) Take by mouth daily       probiotic CAPS        docusate sodium (COLACE) 100 MG capsule Take 100 mg by mouth 2 times daily       mupirocin (BACTROBAN) 2 % external ointment Apply topically 3 times daily (Patient not taking: Reported on 3/19/2019) 30 g 3     VITAMIN D, CHOLECALCIFEROL, PO Take by mouth daily          No Known Allergies      Review of Systems:  -Constitutional: Otherwise feeling well today, in usual state of health.  -HEENT: Patient denies nonhealing oral sores.  -Skin: As above in HPI. No additional skin concerns.    Physical exam:  Vitals: LMP 04/08/2018 (Exact Date)   GEN: This is a well developed, well-nourished female in no acute distress, in a pleasant mood.    SKIN: Full skin, which includes the head/face, both arms, chest (except the breasts), back, abdomen, both legs, buttocks,  digits and/or nails, was examined.  -The pt has over 100 nevi on her arms, proximal legs, abdomen, and back. These are mostly congenital in appearance. Several of these are slightly larger, such as on the mons pubis, right upper arm, and upper back. However, they are overall symmetric in terms of shape and pigment network   -Right upper arm: 1cm X 6mm (ovoid, tan to light brown with cobblestone appearance with evenly distributed  pigment dots)   -Right gluteal cleft: There is a  light brown nevus on the right gluteal cleft. This is mildly asymmetric in shape,  however, this in an area of friction, so this is not unexpected. OVerall, the pigment network is even  -There are two well defined lichenified plaques on the left medial 5th digit and the lateral left hand  -No other lesions of concern on areas examined.     Impression/Plan:    1. Multiple benign nevi    Given the large number of nevi the pt has, reviewed she is at an increased risk of skin cancer    Encouraged her to do monthly skin checks as possible    Discussed sign and symptoms of skin cancer, and provided the pt a handout on sun protection    2. Eczematous dermatitis    Discussed that the pt's dermatitis could be 2/2 wedding ring, however, she does not want to stop wearing this. I did offer to Rx a topical steroid, but she was not interested at this time. Encouraged her to continue with Vanicream PRN      Follow-up in 1 year, earlier for new or changing lesions.       Dr. Donis staffed the patient.    Staff Involved:  Resident(Gilberto)/Staff    Staff Physician Comments:   I saw and evaluated the patient with the resident and I agree with the assessment and plan.  I was present for the examination. I have made edits if needed.    Anthony Donis MD  Staff Dermatologist and Dermatopathologist  , Department of Dermatology

## 2019-03-19 NOTE — PROGRESS NOTES
[facility] Dermatology Note      Dermatology Problem List:  1. Multiple benign nevi    Encounter Date: Mar 19, 2019    CC:   Chief Complaint   Patient presents with     Skin Check     Vianca is here today for a skin check. Areas of concern include a pigmented lesion on buttocks and right upper arm.        History of Present Illness:  Ms. Vianca Valle is a 34 year old female who presents as a new patient for a possibly new mole. The pt notes she has always had numerous nevi. She has never had skin cancer, nor has anyone in her family. She was recently pregnant, and she does not think any of her nevi changed. Today, she would like us to look at two nevi. First, on her right upper arm is think and more than one color. She has also noticed a mole on her right buttock crease. She states it does not itch, hurt, or bleed. She is unsure how long it has been present for. Finally, she notices she will sometiems develop eczema on her hands. She treats this with Vanicream, which she believes helps. Otherwise, the pt states she feels well, and she denies any other general or skin-specific complaints at this time.    Past Medical History:   Patient Active Problem List   Diagnosis     Seasonal allergic rhinitis     S/P  section     Past Medical History:   Diagnosis Date     Allergic rhinitis      Hematuria 2017     Ruptured ear drum, right 2016     Past Surgical History:   Procedure Laterality Date      SECTION N/A 2019    Procedure:  SECTION;  Surgeon: Dotty España MD;  Location:  L+D     TONSILLECTOMY      2002       Social History:  Patient reports that she has quit smoking. Her smoking use included cigarettes. She started smoking about 12 years ago. She has a 5.00 pack-year smoking history. she has never used smokeless tobacco. She reports that she does not drink alcohol or use drugs.    Family History:  Family History   Problem Relation Age of Onset      Thyroid Disease Maternal Grandmother      DANIEL Maternal Grandfather      Diabetes Paternal Grandmother    Reviewed    Medications:  Current Outpatient Medications   Medication Sig Dispense Refill     HERBALS        norethindrone (MICRONOR) 0.35 MG tablet Take 1 tablet (0.35 mg) by mouth daily 84 tablet 3     Prenatal Vit-Fe Fumarate-FA (PRENATAL PO) Take by mouth daily       probiotic CAPS        docusate sodium (COLACE) 100 MG capsule Take 100 mg by mouth 2 times daily       mupirocin (BACTROBAN) 2 % external ointment Apply topically 3 times daily (Patient not taking: Reported on 3/19/2019) 30 g 3     VITAMIN D, CHOLECALCIFEROL, PO Take by mouth daily          No Known Allergies      Review of Systems:  -Constitutional: Otherwise feeling well today, in usual state of health.  -HEENT: Patient denies nonhealing oral sores.  -Skin: As above in HPI. No additional skin concerns.    Physical exam:  Vitals: LMP 04/08/2018 (Exact Date)   GEN: This is a well developed, well-nourished female in no acute distress, in a pleasant mood.    SKIN: Full skin, which includes the head/face, both arms, chest (except the breasts), back, abdomen, both legs, buttocks,  digits and/or nails, was examined.  -The pt has over 100 nevi on her arms, proximal legs, abdomen, and back. These are mostly congenital in appearance. Several of these are slightly larger, such as on the mons pubis, right upper arm, and upper back. However, they are overall symmetric in terms of shape and pigment network   -Right upper arm: 1cm X 6mm (ovoid, tan to light brown with cobblestone appearance with evenly distributed  pigment dots)   -Right gluteal cleft: There is a light brown nevus on the right gluteal cleft. This is mildly asymmetric in shape,  however, this in an area of friction, so this is not unexpected. OVerall, the pigment network is even  -There are two well defined lichenified plaques on the left medial 5th digit and the lateral left hand  -No  other lesions of concern on areas examined.     Impression/Plan:    1. Multiple benign nevi    Given the large number of nevi the pt has, reviewed she is at an increased risk of skin cancer    Encouraged her to do monthly skin checks as possible    Discussed sign and symptoms of skin cancer, and provided the pt a handout on sun protection    2. Eczematous dermatitis    Discussed that the pt's dermatitis could be 2/2 wedding ring, however, she does not want to stop wearing this. I did offer to Rx a topical steroid, but she was not interested at this time. Encouraged her to continue with Vanicream PRN      Follow-up in 1 year, earlier for new or changing lesions.       Dr. Donis staffed the patient.    Staff Involved:  Resident(Gilberto)/Staff    Staff Physician Comments:   I saw and evaluated the patient with the resident and I agree with the assessment and plan.  I was present for the examination. I have made edits if needed.    Anthony Donis MD  Staff Dermatologist and Dermatopathologist  , Department of Dermatology

## 2019-03-19 NOTE — NURSING NOTE
Chief Complaint   Patient presents with     Skin Check     Vianca is here today for a skin check. Areas of concern include a pigmented lesion on buttocks and right upper arm.

## 2019-03-20 ENCOUNTER — OFFICE VISIT (OUTPATIENT)
Dept: FAMILY MEDICINE | Facility: CLINIC | Age: 34
End: 2019-03-20
Payer: COMMERCIAL

## 2019-03-20 DIAGNOSIS — Z91.89 AT RISK FOR BREASTFEEDING DIFFICULTY: Primary | ICD-10-CM

## 2019-03-20 PROCEDURE — 99214 OFFICE O/P EST MOD 30 MIN: CPT | Performed by: NURSE PRACTITIONER

## 2019-03-21 NOTE — PROGRESS NOTES
Initial Lactation Consultation    Baby:  Jules Valle         MRN:  [id number]  Mom:  Vianca Valle                         Consultation Date: 3/19/2019    HPI  Breastfeeding long-term goals: at least 6 months   Breastfeeding story ((how did nursing go right after birth, how is it going now, main concerns, etc):  Did not go so well, pumping if doing it efficiently and if have the right size. Had a milk and blood blister. How much should mom be pumping and how often.    In hospital things were not that great - not latching well and tore nipples up.  Started on the shield.  Didn't poop for a few days.  Gave him finger feeding supplement.  Had not been checked for tongue tie in hospital, but had frenulotomy at clinic.  Nursed with shield after that.  Wasn't sure how it went after because worried about intake and transitioned to exclusive pumping.  Tried breast a day or two ago.  On left for 20 minutes and right on 8 minutes.  Gave formula after and he easily took 3 oz so she felt he must not get enough at the breast.  A night ago dad gave him 8 oz.    RTW at 12 weeks (in four weeks). Works at [employer] and will be able to pump.  Dad will be with Jules for the first month.  Then in-home     Nursing once in a while times per day/every 0 hours.  Nursing on both side(s).  Nursing sessions last about 8-20 minutes per side.    Nipple pain: a little on both sides       PUMPING: Pump in Style 20-25 minutes, heating pad and massage  # times per day:  Usually 4 to 5, both breasts, hands-free; sprays at the beginning  Feels like if she pumps more it is still the same volume  Wondering about flange size - using 24 mm    SUPPLEMENTATION: lactation support, formula once a day     Baby's OUTPUT:   STOOLS:  2-3 per day with yellow seeding appearance  URINE OUTPUT:  Diapers are described as wet     MOTHER      Breastfeeding History  NoN/A    Medical History      Pregnancy History (any complications in this  "pregnancy)  none    Delivery History  Primary  for  Cephalopelvic Disproportion    Labor Meds/Anesthesia  Epidural    Current Medications  Current Outpatient Medications   Medication     docusate sodium (COLACE) 100 MG capsule     HERBALS     norethindrone (MICRONOR) 0.35 MG tablet     Prenatal Vit-Fe Fumarate-FA (PRENATAL PO)     probiotic CAPS     VITAMIN D, CHOLECALCIFEROL, PO     No current facility-administered medications for this visit.        Herbals:  Fenugreek product    ASSESSMENT OF MOTHER    Physical:   Breast appearance  Breast Size: average  Nipple Appearance - Left: abraded  Nipple Appearance - Right: abraded  Nipple Erectility - Left: flat  Nipple Erectility - Right: erect with stimulation  Areolas Compressibility: thick and firm  Nipple Size: average  Milk Supply: mature      BABY       Name: Jules Valle YOB: 2020 Age: 2 month old     Doctor: Heidi Alcala     BABY'S WEIGHT HISTORY    Wt Readings from Last 5 Encounters:   19 4.961 kg (10 lb 15 oz) (21 %)*   19 4.011 kg (8 lb 13.5 oz) (17 %)*   19 3.671 kg (8 lb 1.5 oz) (19 %)*   19 3.43 kg (7 lb 9 oz) (18 %)*   19 3.005 kg (6 lb 10 oz) (14 %)*     * Growth percentiles are based on WHO (Boys, 0-2 years) data.         ASSESSMENT OF BABY    Physical:   Temp 98  F (36.7  C) (Axillary)   Ht 0.572 m (1' 10.5\")   Wt 4.961 kg (10 lb 15 oz)   HC 39.5 cm (15.55\")   BMI 15.19 kg/m      GENERAL: Alert, vigorous, is in no acute distress.  SKIN: skin is clear, no rash or abnormal pigmentation  HEAD: The head is normocephalic. The fontanels and sutures are normal  EYES: The eyes are normal. The conjunctivae and cornea normal.   NOSE: Clear, no discharge or congestion  MOUTH: The mouth is clear.  NECK: The neck is supple and thyroid is normal, no masses  LYMPH NODES: No adenopathy  LUNGS: The lung fields are clear to auscultation,no rales, rhonchi, wheezing or retractions  HEART: The precordium " is quiet. Rhythm is regular. S1 and S2 are normal. No murmurs.   ABDOMEN: The umbilicus is normal. The bowel sounds are normal. Abdomen soft, non tender,  non distended, no masses or hepatosplenomegaly.  NEUROLOGIC: Normal tone throughout.     Oral Anatomy  Mouth: normal  Palate: bubble  Jaw: normal  Tongue: short   Lingual Frenulum: short  Lip Frenulum: normal  Digital Suck Exam: no root      FEEDING ASSESSMENT    Initial position and latch strategy observed: not observed       INTERVENTIONS/EVALUATION:  Other: Pumping, bottling, supply, RTW      SUMMARY  1.  Infant weight gain - following curve; doesn't need 8 oz feedings  2.  Maternal supply - matching infant needs, but would like some cushion  3.  Maternal health - stress and sadness about nursing  4.  Latch - not observed, mom would like to exclusively pump  5.  Milk transfer - see above    RECOMMENDATIONS  Patient Instructions   When Jules gets a bottle, give him the bottle by paced feedings  Videos for paced feeding    https://www.[employer].com/watch?v=GV4sh09IpcO   AND   https://www.breastfeeding-problems.com/paced-bottle-feeding.html    Typically pumping 8 times a day - two let-downs  Start pumping in the day by 6 am because the first two of the morning are the highest production  Once the spray slows to a trickle then hit the yellow button to try and stimulate another let down  15-20 minutes (or less) - after pumping do hand expression  You don't get more by turning up suction  Use coconut oil at edge of flange where nipple goes in  Look into getting Pumpin' Pals - https://pumpinpal.com/  If you get a bit of a stash AND you are making more than 6-8 oz more than what Jules takes in a day then you can scale back    At work plan to pump three times a day - morning break, lunch, afternoon break    Offer Jules about 3-4 oz per feeding  Usually the volume of breastmilk stays the same throughout nursing because your milk changes  Keep pump parts in the fridge  between feedings and wash once every 24 hours      Fenugreek supplement for mother-  (to increase milk production): More Milk Plus-(Motherlove brand) -2 capsules three times/day  or   Fenugreek capsules: 3 capsules 3 times daily for 1-2 weeks. Dosage range should be 1000-1500mg three times/day.   OR  Moringa/M[place] capsules (Go-Lacta is one brand) - dose range is 700-1050 mg 2-3 times a day  I would not recommend both at the same time  If you are getting more plugged ducts then cut back or start lecithin supplement      Nipple sizes L- 12 mm  R- 13x15 mm  Medela flange size 21      Follow up: PRN    60 minutes time spent face-to-face, 30 with mother and 30 with baby, with over 50% spent in counseling/coordination of care regarding breastfeeding goals, latch, nipple care, weight gain expectations, and pumping.     TAIWO Taveras

## 2019-05-27 ENCOUNTER — MYC MEDICAL ADVICE (OUTPATIENT)
Dept: FAMILY MEDICINE | Facility: CLINIC | Age: 34
End: 2019-05-27

## 2019-05-28 NOTE — TELEPHONE ENCOUNTER
Routing to provider pool  Marcia    Advise    Christi Horn, JUDD   [facility]

## 2019-05-28 NOTE — TELEPHONE ENCOUNTER
marcelle message to pt    Dr. Rosales response was sent to pt    Christi Horn RN   [facility]

## 2019-06-15 ENCOUNTER — MYC MEDICAL ADVICE (OUTPATIENT)
Dept: PODIATRY | Facility: CLINIC | Age: 34
End: 2019-06-15

## 2019-06-15 DIAGNOSIS — M72.2 PLANTAR FASCIITIS: Primary | ICD-10-CM

## 2019-06-27 ENCOUNTER — MYC MEDICAL ADVICE (OUTPATIENT)
Dept: PODIATRY | Facility: CLINIC | Age: 34
End: 2019-06-27

## 2019-08-14 ENCOUNTER — OFFICE VISIT (OUTPATIENT)
Dept: INTERNAL MEDICINE | Facility: CLINIC | Age: 34
End: 2019-08-14
Attending: INTERNAL MEDICINE
Payer: COMMERCIAL

## 2019-08-14 VITALS
BODY MASS INDEX: 30.65 KG/M2 | SYSTOLIC BLOOD PRESSURE: 117 MMHG | TEMPERATURE: 97.7 F | HEART RATE: 88 BPM | WEIGHT: 173 LBS | DIASTOLIC BLOOD PRESSURE: 82 MMHG

## 2019-08-14 DIAGNOSIS — J01.00 ACUTE MAXILLARY SINUSITIS, RECURRENCE NOT SPECIFIED: Primary | ICD-10-CM

## 2019-08-14 PROCEDURE — G0463 HOSPITAL OUTPT CLINIC VISIT: HCPCS | Mod: ZF

## 2019-08-14 RX ORDER — IBUPROFEN 600 MG/1
600 TABLET, FILM COATED ORAL EVERY 6 HOURS PRN
Qty: 30 TABLET | Refills: 1 | Status: SHIPPED | OUTPATIENT
Start: 2019-08-14 | End: 2021-04-29

## 2019-08-14 RX ORDER — CETIRIZINE HYDROCHLORIDE 10 MG/1
10 TABLET ORAL DAILY
Qty: 30 TABLET | Refills: 0 | Status: SHIPPED | OUTPATIENT
Start: 2019-08-14 | End: 2021-04-29

## 2019-08-14 RX ORDER — FLUTICASONE PROPIONATE 50 MCG
1 SPRAY, SUSPENSION (ML) NASAL DAILY
Qty: 15.8 ML | Refills: 1 | Status: SHIPPED | OUTPATIENT
Start: 2019-08-14 | End: 2021-04-29

## 2019-08-14 ASSESSMENT — PAIN SCALES - GENERAL: PAINLEVEL: NO PAIN (0)

## 2019-08-14 ASSESSMENT — ANXIETY QUESTIONNAIRES
3. WORRYING TOO MUCH ABOUT DIFFERENT THINGS: NOT AT ALL
7. FEELING AFRAID AS IF SOMETHING AWFUL MIGHT HAPPEN: NOT AT ALL
5. BEING SO RESTLESS THAT IT IS HARD TO SIT STILL: NOT AT ALL
1. FEELING NERVOUS, ANXIOUS, OR ON EDGE: NOT AT ALL
2. NOT BEING ABLE TO STOP OR CONTROL WORRYING: NOT AT ALL
GAD7 TOTAL SCORE: 0
6. BECOMING EASILY ANNOYED OR IRRITABLE: NOT AT ALL

## 2019-08-14 ASSESSMENT — PATIENT HEALTH QUESTIONNAIRE - PHQ9
5. POOR APPETITE OR OVEREATING: NOT AT ALL
SUM OF ALL RESPONSES TO PHQ QUESTIONS 1-9: 2

## 2019-08-14 NOTE — LETTER
8/14/2019       RE: Vianca Valle  [address] [place] [address]     Dear Colleague,    Thank you for referring your patient, Vianca Valle, to the [facility]  at [facility]. Please see a copy of my visit note below.    HPI  Patient is here for evaluation of nasal congestion and pressure. She states that she has had the symptoms for 2 days. She reports that she had a sore throat a few days ago. It lasted about 3 days. She reports occasional dry cough. She had a slight temp of 100.2 this morning. She states that she has no history of sinus infections. She took aspirin and Sudafed this morning. She denies postnasal drip. She denies significant ear pain.     Review of Systems     Constitutional:  Positive for fever and fatigue. Negative for chills and increased energy.   HENT:  Positive for sore throat and sinus congestion. Negative for dry mouth.    Respiratory:   Negative for cough and dyspnea on exertion.    Cardiovascular:  Negative for chest pain, dyspnea on exertion and edema.   Skin:  Negative for itching and rash.   Endo/Heme:  Negative for anemia, swollen glands and bruises/bleeds easily.   Psychiatric/Behavioral:  Negative for depression, decreased concentration, mood swings and panic attacks.    Endocrine:  Negative for altered temperature regulation, polyphagia, polydipsia, unwanted hair growth and change in facial hair.    Current Outpatient Medications   Medication     docusate sodium (COLACE) 100 MG capsule     HERBALS     norethindrone (MICRONOR) 0.35 MG tablet     Prenatal Vit-Fe Fumarate-FA (PRENATAL PO)     probiotic CAPS     VITAMIN D, CHOLECALCIFEROL, PO     No current facility-administered medications for this visit.      Vitals:    08/14/19 1024 08/14/19 1029 08/14/19 1030 08/14/19 1031   BP: 123/85 115/81 115/81 117/82   Pulse: 88 88 88 88   Temp: 97.7  F (36.5  C)      TempSrc: Oral      Weight: 78.5 kg (173  lb)        Physical Exam   Constitutional: She is oriented to person, place, and time. She appears well-developed and well-nourished.   HENT:   Head: Normocephalic and atraumatic.   Eyes: Pupils are equal, round, and reactive to light. EOM are normal.   Neck: Normal range of motion. Neck supple.   Cardiovascular: Normal rate and regular rhythm.   Musculoskeletal: She exhibits no edema.   Neurological: She is alert and oriented to person, place, and time.   Skin: Skin is warm and dry.   Psychiatric: She has a normal mood and affect. Her behavior is normal. Judgment and thought content normal.   Nursing note and vitals reviewed.    Assessment and Plan:  Vianca was seen today for recheck.    Diagnoses and all orders for this visit:    Acute maxillary sinusitis, recurrence not specified. Reviewed possible causes of sinus infection with the patient. Recommend symptomatic management. Safety of medications in lactation were reviewed with the patient. Patient was also advised to contact the clinic if her symptoms are not improving as she is at risk for bacterial sinus infection. Patient is in agreement with the plan.   -     fluticasone (FLONASE) 50 MCG/ACT nasal spray; Spray 1 spray into both nostrils daily  -     ibuprofen (ADVIL/MOTRIN) 600 MG tablet; Take 1 tablet (600 mg) by mouth every 6 hours as needed for moderate pain  -     cetirizine (ZYRTEC) 10 MG tablet; Take 1 tablet (10 mg) by mouth daily      Total time spent 25 minutes.  More than 50% of the time spent with Ms. Valle on counseling / coordinating her care    Abeba Thompson MD

## 2019-08-14 NOTE — PROGRESS NOTES
HPI  Patient is here for evaluation of nasal congestion and pressure. She states that she has had the symptoms for 2 days. She reports that she had a sore throat a few days ago. It lasted about 3 days. She reports occasional dry cough. She had a slight temp of 100.2 this morning. She states that she has no history of sinus infections. She took aspirin and Sudafed this morning. She denies postnasal drip. She denies significant ear pain.     Review of Systems     Constitutional:  Positive for fever and fatigue. Negative for chills and increased energy.   HENT:  Positive for sore throat and sinus congestion. Negative for dry mouth.    Respiratory:   Negative for cough and dyspnea on exertion.    Cardiovascular:  Negative for chest pain, dyspnea on exertion and edema.   Skin:  Negative for itching and rash.   Endo/Heme:  Negative for anemia, swollen glands and bruises/bleeds easily.   Psychiatric/Behavioral:  Negative for depression, decreased concentration, mood swings and panic attacks.    Endocrine:  Negative for altered temperature regulation, polyphagia, polydipsia, unwanted hair growth and change in facial hair.    Current Outpatient Medications   Medication     docusate sodium (COLACE) 100 MG capsule     HERBALS     norethindrone (MICRONOR) 0.35 MG tablet     Prenatal Vit-Fe Fumarate-FA (PRENATAL PO)     probiotic CAPS     VITAMIN D, CHOLECALCIFEROL, PO     No current facility-administered medications for this visit.      Vitals:    08/14/19 1024 08/14/19 1029 08/14/19 1030 08/14/19 1031   BP: 123/85 115/81 115/81 117/82   Pulse: 88 88 88 88   Temp: 97.7  F (36.5  C)      TempSrc: Oral      Weight: 78.5 kg (173 lb)            Physical Exam   Constitutional: She is oriented to person, place, and time. She appears well-developed and well-nourished.   HENT:   Head: Normocephalic and atraumatic.   Eyes: Pupils are equal, round, and reactive to light. EOM are normal.   Neck: Normal range of motion. Neck supple.    Cardiovascular: Normal rate and regular rhythm.   Musculoskeletal: She exhibits no edema.   Neurological: She is alert and oriented to person, place, and time.   Skin: Skin is warm and dry.   Psychiatric: She has a normal mood and affect. Her behavior is normal. Judgment and thought content normal.   Nursing note and vitals reviewed.    Assessment and Plan:  Vianca was seen today for recheck.    Diagnoses and all orders for this visit:    Acute maxillary sinusitis, recurrence not specified. Reviewed possible causes of sinus infection with the patient. Recommend symptomatic management. Safety of medications in lactation were reviewed with the patient. Patient was also advised to contact the clinic if her symptoms are not improving as she is at risk for bacterial sinus infection. Patient is in agreement with the plan.   -     fluticasone (FLONASE) 50 MCG/ACT nasal spray; Spray 1 spray into both nostrils daily  -     ibuprofen (ADVIL/MOTRIN) 600 MG tablet; Take 1 tablet (600 mg) by mouth every 6 hours as needed for moderate pain  -     cetirizine (ZYRTEC) 10 MG tablet; Take 1 tablet (10 mg) by mouth daily      Total time spent 25 minutes.  More than 50% of the time spent with Ms. Valle on counseling / coordinating her care    Abeba Thompson MD

## 2019-08-15 ASSESSMENT — ANXIETY QUESTIONNAIRES: GAD7 TOTAL SCORE: 0

## 2019-08-18 ASSESSMENT — ENCOUNTER SYMPTOMS
COUGH: 0
SWOLLEN GLANDS: 0
POLYPHAGIA: 0
PANIC: 0
SINUS CONGESTION: 1
INCREASED ENERGY: 0
DYSPNEA ON EXERTION: 0
DECREASED CONCENTRATION: 0
NERVOUS/ANXIOUS: 0
BRUISES/BLEEDS EASILY: 0
SORE THROAT: 1
DEPRESSION: 0
FEVER: 1
POLYDIPSIA: 0
CHILLS: 0
FATIGUE: 1
ALTERED TEMPERATURE REGULATION: 0
INSOMNIA: 0

## 2019-10-29 ENCOUNTER — MYC MEDICAL ADVICE (OUTPATIENT)
Dept: FAMILY MEDICINE | Facility: CLINIC | Age: 34
End: 2019-10-29

## 2019-10-30 NOTE — TELEPHONE ENCOUNTER
Routing to provider - Arie - please review and advise as appropriate    Lactation questions:  How to stop breastfeeding/pumping - see marcelle    Last office visit 3/20/19 - lactation visit

## 2020-03-02 ENCOUNTER — HEALTH MAINTENANCE LETTER (OUTPATIENT)
Age: 35
End: 2020-03-02

## 2020-04-17 ENCOUNTER — ANCILLARY PROCEDURE (OUTPATIENT)
Dept: MAMMOGRAPHY | Facility: CLINIC | Age: 35
End: 2020-04-17
Attending: INTERNAL MEDICINE
Payer: COMMERCIAL

## 2020-04-17 ENCOUNTER — TELEPHONE (OUTPATIENT)
Dept: OBGYN | Facility: CLINIC | Age: 35
End: 2020-04-17

## 2020-04-17 DIAGNOSIS — Z87.2 HISTORY OF DIMPLING OF BREAST SKIN: Primary | ICD-10-CM

## 2020-04-17 DIAGNOSIS — R23.4 BREAST SKIN CHANGES: ICD-10-CM

## 2020-04-17 NOTE — TELEPHONE ENCOUNTER
Discussed symptoms with Dr. Thompson. She ordered imaging for breast. Patient will call and schedule imaging in breast center.

## 2020-06-08 ENCOUNTER — VIRTUAL VISIT (OUTPATIENT)
Dept: INTERNAL MEDICINE | Facility: CLINIC | Age: 35
End: 2020-06-08
Attending: INTERNAL MEDICINE
Payer: COMMERCIAL

## 2020-06-08 DIAGNOSIS — N91.2 AMENORRHEA: ICD-10-CM

## 2020-06-08 DIAGNOSIS — Z20.822 EXPOSURE TO COVID-19 VIRUS: ICD-10-CM

## 2020-06-08 DIAGNOSIS — G89.29 CHRONIC MIDLINE LOW BACK PAIN WITHOUT SCIATICA: Primary | ICD-10-CM

## 2020-06-08 DIAGNOSIS — M54.50 CHRONIC MIDLINE LOW BACK PAIN WITHOUT SCIATICA: Primary | ICD-10-CM

## 2020-06-08 NOTE — PROGRESS NOTES
"Vianca Valle is a 35 year old female who is being evaluated via a billable video visit.      The patient has been notified of following:     \"This video visit will be conducted via a call between you and your physician/provider. We have found that certain health care needs can be provided without the need for an in-person physical exam.  This service lets us provide the care you need with a video conversation.  If a prescription is necessary we can send it directly to your pharmacy.  If lab work is needed we can place an order for that and you can then stop by our lab to have the test done at a later time.    Video visits are billed at different rates depending on your insurance coverage.  Please reach out to your insurance provider with any questions.    If during the course of the call the physician/provider feels a video visit is not appropriate, you will not be charged for this service.\"    Patient has given verbal consent for Video visit? Yes    How would you like to obtain your AVS? [facility]    Patient would like the video invitation sent by: Text to cell phone: [phone]     Will anyone else be joining your video visit? No    Subjective     Vianca Valle is a 35 year old female who presents today via video visit for the following health issues:    HPI  Patient is wondering about her periods being abscent. She is currently on the birth control pill. She reports that she is taking the full pack, including placebo pills. She stopped breastfeeding in November. She has restarted the birth control pill in November. Patient also reports that she is having some body aches. She reports that she has been waking up with back pain. She states that she had some back issues in pregnancy. However, she has more pain and stiffness aolng the spine (lower and mid back). 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 She had back issues related to her work because of the " position.   Patient reports that she was sick in February, had fever, chills, cough and sore throat. She reports that she also had some shortness of breath.        Video Start Time: 9:00 AM    -------------------------------------    Patient Active Problem List   Diagnosis     Seasonal allergic rhinitis     S/P  section     Past Surgical History:   Procedure Laterality Date      SECTION N/A 2019    Procedure:  SECTION;  Surgeon: Dotty España MD;  Location:  L+D     TONSILLECTOMY      2002       Social History     Tobacco Use     Smoking status: Former Smoker     Packs/day: 0.50     Years: 10.00     Pack years: 5.00     Types: Cigarettes     Start date: 2006     Smokeless tobacco: Never Used     Tobacco comment: 2018   Substance Use Topics     Alcohol use: No     Alcohol/week: 1.0 - 3.0 standard drinks     Family History   Problem Relation Age of Onset     Thyroid Disease Maternal Grandmother      KATHY. Maternal Grandfather      Diabetes Paternal Grandmother          Current Outpatient Medications   Medication Sig Dispense Refill     cetirizine (ZYRTEC) 10 MG tablet Take 1 tablet (10 mg) by mouth daily 30 tablet 0     docusate sodium (COLACE) 100 MG capsule Take 100 mg by mouth 2 times daily       fluticasone (FLONASE) 50 MCG/ACT nasal spray Spray 1 spray into both nostrils daily 15.8 mL 1     HERBALS        ibuprofen (ADVIL/MOTRIN) 600 MG tablet Take 1 tablet (600 mg) by mouth every 6 hours as needed for moderate pain 30 tablet 1     levonorgestrel-ethinyl estradiol (AVIANE/ALESSE/LESSINA) 0.1-20 MG-MCG tablet Take 1 tablet by mouth daily 84 tablet 4     norethindrone (MICRONOR) 0.35 MG tablet Take 1 tablet (0.35 mg) by mouth daily 84 tablet 3     Prenatal Vit-Fe Fumarate-FA (PRENATAL PO) Take by mouth daily       probiotic CAPS        VITAMIN D, CHOLECALCIFEROL, PO Take by mouth daily         Reviewed and updated as needed this visit by Provider         Review of  "Systems   CONSTITUTIONAL: NEGATIVE for fever, chills, change in weight  INTEGUMENTARY/SKIN: NEGATIVE for worrisome rashes, moles or lesions  EYES: NEGATIVE for vision changes or irritation  ENT/MOUTH: NEGATIVE for ear, mouth and throat problems  RESP: NEGATIVE for significant cough or SOB  BREAST: NEGATIVE for masses, tenderness or discharge  CV: NEGATIVE for chest pain, palpitations or peripheral edema  GI: NEGATIVE for nausea, abdominal pain, heartburn, or change in bowel habits   female: amenorrhea  MUSCULOSKELETAL:back pain  NEURO: NEGATIVE for weakness, dizziness or paresthesias  ENDOCRINE: NEGATIVE for temperature intolerance, skin/hair changes  HEME: NEGATIVE for bleeding problems  PSYCHIATRIC: NEGATIVE for changes in mood or affect      Objective    There were no vitals taken for this visit.  Estimated body mass index is 30.65 kg/m  as calculated from the following:    Height as of 3/11/19: 1.6 m (5' 3\").    Weight as of 8/14/19: 78.5 kg (173 lb).  Physical Exam     GENERAL: Healthy, alert and no distress  EYES: Eyes grossly normal to inspection.  No discharge or erythema, or obvious scleral/conjunctival abnormalities.  RESP: No audible wheeze, cough, or visible cyanosis.  No visible retractions or increased work of breathing.    SKIN: Visible skin clear. No significant rash, abnormal pigmentation or lesions.  NEURO: Cranial nerves grossly intact.  Mentation and speech appropriate for age.  PSYCH: Mentation appears normal, affect normal/bright, judgement and insight intact, normal speech and appearance well-groomed.      Diagnostic Test Results:  Labs reviewed in Epic        Assessment & Plan     1. Chronic midline low back pain without sciatica  Discussed possible causes, suspect muscle strain. Recommend evaluation by Sports Medicine physician. Referral was given to the patient today.   - Orthopedic & Spine  Referral; Future    2. Exposure to Covid-19 Virus  Patient is concerned about prior " exposure to COVID. She is interested in antibody testing. Discussed significance of testing, recommend testing at patient's convenience.   - COVID-19 Virus (Coronavirus) Antibody; Future     3. Amenorrhea. Discussed possible causes, recommend to continue with OCP as it is likely a medication side effect. Patient was advised to do a pregnancy test at home as well.       No follow-ups on file.    Abeba Thompson MD  [facility]       Video-Visit Details    Type of service:  Video Visit    Video End Time:9:30 AM    Originating Location (pt. Location): Home    Distant Location (provider location):  [facility]      Platform used for Video Visit: Doximity    No follow-ups on file.       Abeba Thompson MD

## 2020-06-08 NOTE — LETTER
"6/8/2020       RE: Vianca Valle  [address] [place] [address]     Dear Colleague,    Thank you for referring your patient, Vianca Valle, to the [facility]  at [facility]. Please see a copy of my visit note below.    Vianca Valle is a 35 year old female who is being evaluated via a billable video visit.      The patient has been notified of following:     \"This video visit will be conducted via a call between you and your physician/provider. We have found that certain health care needs can be provided without the need for an in-person physical exam.  This service lets us provide the care you need with a video conversation.  If a prescription is necessary we can send it directly to your pharmacy.  If lab work is needed we can place an order for that and you can then stop by our lab to have the test done at a later time.    Video visits are billed at different rates depending on your insurance coverage.  Please reach out to your insurance provider with any questions.    If during the course of the call the physician/provider feels a video visit is not appropriate, you will not be charged for this service.\"    Patient has given verbal consent for Video visit? Yes    How would you like to obtain your AVS? [facility]    Patient would like the video invitation sent by: Text to cell phone: [phone]     Will anyone else be joining your video visit? No    Subjective     Vianca Valle is a 35 year old female who presents today via video visit for the following health issues:    HPI  Patient is wondering about her periods being abscent. She is currently on the birth control pill. She reports that she is taking the full pack, including placebo pills. She stopped breastfeeding in November. She has restarted the birth control pill in November. Patient also reports that she is having some body aches. She reports that she has been waking up " with back pain. She states that she had some back issues in pregnancy. However, she has more pain and stiffness aolng the spine (lower and mid back). 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 She had back issues related to her work because of the position.   Patient reports that she was sick in February, had fever, chills, cough and sore throat. She reports that she also had some shortness of breath.        Video Start Time: 9:00 AM    -------------------------------------    Patient Active Problem List   Diagnosis     Seasonal allergic rhinitis     S/P  section     Past Surgical History:   Procedure Laterality Date      SECTION N/A 2019    Procedure:  SECTION;  Surgeon: Dotty España MD;  Location:  L+D     TONSILLECTOMY      2002       Social History     Tobacco Use     Smoking status: Former Smoker     Packs/day: 0.50     Years: 10.00     Pack years: 5.00     Types: Cigarettes     Start date: 2006     Smokeless tobacco: Never Used     Tobacco comment: 2018   Substance Use Topics     Alcohol use: No     Alcohol/week: 1.0 - 3.0 standard drinks     Family History   Problem Relation Age of Onset     Thyroid Disease Maternal Grandmother      KATHY. Maternal Grandfather      Diabetes Paternal Grandmother          Current Outpatient Medications   Medication Sig Dispense Refill     cetirizine (ZYRTEC) 10 MG tablet Take 1 tablet (10 mg) by mouth daily 30 tablet 0     docusate sodium (COLACE) 100 MG capsule Take 100 mg by mouth 2 times daily       fluticasone (FLONASE) 50 MCG/ACT nasal spray Spray 1 spray into both nostrils daily 15.8 mL 1     HERBALS        ibuprofen (ADVIL/MOTRIN) 600 MG tablet Take 1 tablet (600 mg) by mouth every 6 hours as needed for moderate pain 30 tablet 1     levonorgestrel-ethinyl estradiol (AVIANE/ALESSE/LESSINA) 0.1-20 MG-MCG tablet Take 1 tablet by mouth daily 84 tablet 4     norethindrone (MICRONOR)  "0.35 MG tablet Take 1 tablet (0.35 mg) by mouth daily 84 tablet 3     Prenatal Vit-Fe Fumarate-FA (PRENATAL PO) Take by mouth daily       probiotic CAPS        VITAMIN D, CHOLECALCIFEROL, PO Take by mouth daily         Reviewed and updated as needed this visit by Provider         Review of Systems   CONSTITUTIONAL: NEGATIVE for fever, chills, change in weight  INTEGUMENTARY/SKIN: NEGATIVE for worrisome rashes, moles or lesions  EYES: NEGATIVE for vision changes or irritation  ENT/MOUTH: NEGATIVE for ear, mouth and throat problems  RESP: NEGATIVE for significant cough or SOB  BREAST: NEGATIVE for masses, tenderness or discharge  CV: NEGATIVE for chest pain, palpitations or peripheral edema  GI: NEGATIVE for nausea, abdominal pain, heartburn, or change in bowel habits   female: amenorrhea  MUSCULOSKELETAL:back pain  NEURO: NEGATIVE for weakness, dizziness or paresthesias  ENDOCRINE: NEGATIVE for temperature intolerance, skin/hair changes  HEME: NEGATIVE for bleeding problems  PSYCHIATRIC: NEGATIVE for changes in mood or affect      Objective    There were no vitals taken for this visit.  Estimated body mass index is 30.65 kg/m  as calculated from the following:    Height as of 3/11/19: 1.6 m (5' 3\").    Weight as of 8/14/19: 78.5 kg (173 lb).  Physical Exam     GENERAL: Healthy, alert and no distress  EYES: Eyes grossly normal to inspection.  No discharge or erythema, or obvious scleral/conjunctival abnormalities.  RESP: No audible wheeze, cough, or visible cyanosis.  No visible retractions or increased work of breathing.    SKIN: Visible skin clear. No significant rash, abnormal pigmentation or lesions.  NEURO: Cranial nerves grossly intact.  Mentation and speech appropriate for age.  PSYCH: Mentation appears normal, affect normal/bright, judgement and insight intact, normal speech and appearance well-groomed.      Diagnostic Test Results:  Labs reviewed in Epic        Assessment & Plan     1. Chronic midline low " back pain without sciatica  Discussed possible causes, suspect muscle strain. Recommend evaluation by Sports Medicine physician. Referral was given to the patient today.   - Orthopedic & Spine  Referral; Future    2. Exposure to Covid-19 Virus  Patient is concerned about prior exposure to COVID. She is interested in antibody testing. Discussed significance of testing, recommend testing at patient's convenience.   - COVID-19 Virus (Coronavirus) Antibody; Future     3. Amenorrhea. Discussed possible causes, recommend to continue with OCP as it is likely a medication side effect. Patient was advised to do a pregnancy test at home as well.       No follow-ups on file.    Abeba Thompson MD  [facility]       Video-Visit Details    Type of service:  Video Visit    Video End Time:9:30 AM    Originating Location (pt. Location): Home    Distant Location (provider location):  [facility]      Platform used for Video Visit: Doximity    No follow-ups on file.       Abeba Thompson MD

## 2020-06-09 LAB
COVID-19 SPIKE RBD ABY TITER: NORMAL
COVID-19 SPIKE RBD ABY: NEGATIVE

## 2020-06-22 ENCOUNTER — OFFICE VISIT (OUTPATIENT)
Dept: NEUROSURGERY | Facility: CLINIC | Age: 35
End: 2020-06-22
Attending: INTERNAL MEDICINE
Payer: COMMERCIAL

## 2020-06-22 VITALS
HEIGHT: 63 IN | WEIGHT: 181 LBS | DIASTOLIC BLOOD PRESSURE: 81 MMHG | HEART RATE: 96 BPM | BODY MASS INDEX: 32.07 KG/M2 | RESPIRATION RATE: 18 BRPM | OXYGEN SATURATION: 96 % | TEMPERATURE: 98.1 F | SYSTOLIC BLOOD PRESSURE: 121 MMHG

## 2020-06-22 DIAGNOSIS — M54.50 CHRONIC MIDLINE LOW BACK PAIN WITHOUT SCIATICA: Primary | ICD-10-CM

## 2020-06-22 DIAGNOSIS — G89.29 CHRONIC MIDLINE LOW BACK PAIN WITHOUT SCIATICA: Primary | ICD-10-CM

## 2020-06-22 PROCEDURE — G0463 HOSPITAL OUTPT CLINIC VISIT: HCPCS

## 2020-06-22 PROCEDURE — 99203 OFFICE O/P NEW LOW 30 MIN: CPT | Performed by: PHYSICIAN ASSISTANT

## 2020-06-22 ASSESSMENT — MIFFLIN-ST. JEOR: SCORE: 1480.14

## 2020-06-22 ASSESSMENT — PAIN SCALES - GENERAL: PAINLEVEL: SEVERE PAIN (6)

## 2020-06-22 NOTE — NURSING NOTE
"Vianca Valle is a 35 year old female who presents for:  Chief Complaint   Patient presents with     Consult For     LBP        Initial Vitals:  /81   Pulse 96   Temp 98.1  F (36.7  C) (Oral)   Resp 18   Ht 5' 3\" (1.6 m)   Wt 181 lb (82.1 kg)   SpO2 96%   BMI 32.06 kg/m   Estimated body mass index is 32.06 kg/m  as calculated from the following:    Height as of this encounter: 5' 3\" (1.6 m).    Weight as of this encounter: 181 lb (82.1 kg).. Body surface area is 1.91 meters squared. BP completed using cuff size: regular  Severe Pain (6)    Nursing Comments: Pt present today for consult of LBP.    Virgil Clayton [facility]    "

## 2020-06-22 NOTE — LETTER
6/22/2020         RE: Vianca Valle  [address] [place] [address]        Dear Colleague,    Thank you for referring your patient, Vianca Valle, to the [facility]. Please see a copy of my visit note below.    NEUROSURGERY CLINIC CONSULT NOTE     DATE OF VISIT: 6/22/2020     SUBJECTIVE:     Vianca Valle is a pleasant 35 year old female who presents to the clinic today for consultation on lumbar spine pain without radicular symptoms. She is referred to the Neurosurgery Clinic by Dr. Santos in .   Today, Ms. Valle reports a 2 year-history of symptoms. She describes constant, dull aching discomfort that initiates in the midline low lumbar region, but does not radiates distally but does affect her lumbar, thoracic muscle girdle. This pain is not accompanied by paresthesias, numbness or weakness. She does experience quite a bit stiffness. Forward flexion and waking up in the morning going from lying to standing aggravate the symptoms, while alleviation is obtained by positional changes. Her symptoms are believed to have been provoked by her pregnancy. There are no bowel or bladder changes.     The patient has not participated in any recent conservative therapies.    Current Outpatient Medications:      cetirizine (ZYRTEC) 10 MG tablet, Take 1 tablet (10 mg) by mouth daily, Disp: 30 tablet, Rfl: 0     fluticasone (FLONASE) 50 MCG/ACT nasal spray, Spray 1 spray into both nostrils daily, Disp: 15.8 mL, Rfl: 1     HERBALS, , Disp: , Rfl:      ibuprofen (ADVIL/MOTRIN) 600 MG tablet, Take 1 tablet (600 mg) by mouth every 6 hours as needed for moderate pain, Disp: 30 tablet, Rfl: 1     levonorgestrel-ethinyl estradiol (AVIANE/ALESSE/LESSINA) 0.1-20 MG-MCG tablet, Take 1 tablet by mouth daily, Disp: 84 tablet, Rfl: 4     norethindrone (MICRONOR) 0.35 MG tablet, Take 1 tablet (0.35 mg) by mouth daily, Disp: 84 tablet, Rfl: 3     Prenatal Vit-Fe Fumarate-FA (PRENATAL PO), Take  "by mouth daily, Disp: , Rfl:      probiotic CAPS, , Disp: , Rfl:      VITAMIN D, CHOLECALCIFEROL, PO, Take by mouth daily, Disp: , Rfl:      docusate sodium (COLACE) 100 MG capsule, Take 100 mg by mouth 2 times daily, Disp: , Rfl:      No Known Allergies     Past Medical History:   Diagnosis Date     Allergic rhinitis      Hematuria 2017     Ruptured ear drum, right 2016        ROS: 10 point review of symptoms are negative other than the symptoms noted above in the HPI.     Family History has been reviewed with the patient, there are no pertinent findings to presenting concern.     Past Surgical History:   Procedure Laterality Date      SECTION N/A 2019    Procedure:  SECTION;  Surgeon: Dotty España MD;  Location:  L+D     TONSILLECTOMY      2002        Social History     Tobacco Use     Smoking status: Former Smoker     Packs/day: 0.50     Years: 10.00     Pack years: 5.00     Types: Cigarettes     Start date: 2006     Smokeless tobacco: Never Used     Tobacco comment: 2018   Substance Use Topics     Alcohol use: No     Alcohol/week: 1.0 - 3.0 standard drinks     Drug use: No        OBJECTIVE:   /81   Pulse 96   Temp 98.1  F (36.7  C) (Oral)   Resp 18   Ht 1.6 m (5' 3\")   Wt 82.1 kg (181 lb)   SpO2 96%   BMI 32.06 kg/m     Body mass index is 32.06 kg/m .     Imaging:     None    Exam:     Patient appears comfortable, conversational, and in no apparent distress.   Head: Normocephalic, without obvious abnormality, atraumatic, no facial asymmetry.   Eyes: conjunctivae/corneas clear. PERRL, EOM's intact.   Throat: lips, mucosa, and tongue normal; teeth and gums normal.   Neck: supple, symmetrical, trachea midline, no adenopathy and thyroid: not enlarged, symmetric, no tenderness/mass/nodules.   Lungs: clear to auscultation bilaterally.   Heart: regular rate and rhythm.   Abdomen: soft, non-tender; bowel sounds normal; no masses, no organomegaly.   Pulses: 2+ and " symmetric.   Skin: Skin color, texture, turgor normal. No rashes or lesions.     CN II-XII grossly intact, alert and appropriate with conversation and following commands.   Gait is non-antalgic. Able to tandem walk. Able to walk on toes and heels without difficulty.   Cervical spine is non tender to palpation. Appropriate range of motion of neck, not concerning for lhermitte's phenomenon.   Bilateral bicep 2/4 and tricep reflexes 1/4. Sensation intact throughout upper extremities.     UE muscle strength  Right  Left    Deltoid  5/5  5/5    Biceps  5/5  5/5    Triceps  5/5  5/5    Hand intrinsics  5/5  5/5    Hand grasp  5/5  5/5    Gonzales signs  neg  neg      Lumbar spine is non tender to palpation   Intact sensation throughout lower extremities.   Bilateral patellar 2/4 and achilles reflex 1/4. Negative for pain with straight leg raise.     LE muscle strength  Right  Left    Iliopsoas (hip flexion)  5/5  5/5    Quad (knee extension)  5/5  5/5    Hamstring (knee flexion)  5/5  5/5    Gastrocnemius (PF)  5/5  5/5    Tibialis Ant. (DF)  5/5  5/5    EHL  5/5  5/5      Negative Babinski bilaterally. Negative for clonus.   Calves are soft and non-tender bilaterally.     ASSESSMENT/PLAN:     Vianca Valle is a 35 year old female who presents to the clinic for consultation on lumbar spine pain and stiffness, there are no radicular symptms. There is no imaging to review and her exam is normal. I believe that her symptoms are not necessarily caused by any structure concerns, but more likely due to postural and body mechanics. The patient has not attempted any conservative management.     We feel that it would be in Ms. Valle's best interest to try a conservative approach by participating in a program initiated by our colleagues at the [facility]. She did inquire about possible treatment options that JUAN may consider so we briefly discussed core stretching and strengthening exercises, but again,  we explained that treatments will be determined by the team at [facility]. We would like to see her back in three months if needed to further discuss other interventions to include obtaining a lumbar MRI.     In the event that patient's symptoms worsen or change we would like to see her sooner. We also discussed signs of a worsening problem that she should seek being evaluated.       Respectfully,     GABRIELA Pryor PA-C  [facility]  [facility]  Tel: [phone]  Pager: [phone]     Exam, imaging, and plan reviewed by Dr. Black.     Again, thank you for allowing me to participate in the care of your patient.        Sincerely,        Imer Sims PA-C

## 2020-06-22 NOTE — PROGRESS NOTES
NEUROSURGERY CLINIC CONSULT NOTE     DATE OF VISIT: 6/22/2020     SUBJECTIVE:     Vianca Valle is a pleasant 35 year old female who presents to the clinic today for consultation on lumbar spine pain without radicular symptoms. She is referred to the Neurosurgery Clinic by Dr. Santos in .   Today, Ms. Valle reports a 2 year-history of symptoms. She describes constant, dull aching discomfort that initiates in the midline low lumbar region, but does not radiates distally but does affect her lumbar, thoracic muscle girdle. This pain is not accompanied by paresthesias, numbness or weakness. She does experience quite a bit stiffness. Forward flexion and waking up in the morning going from lying to standing aggravate the symptoms, while alleviation is obtained by positional changes. Her symptoms are believed to have been provoked by her pregnancy. There are no bowel or bladder changes.     The patient has not participated in any recent conservative therapies.    Current Outpatient Medications:      cetirizine (ZYRTEC) 10 MG tablet, Take 1 tablet (10 mg) by mouth daily, Disp: 30 tablet, Rfl: 0     fluticasone (FLONASE) 50 MCG/ACT nasal spray, Spray 1 spray into both nostrils daily, Disp: 15.8 mL, Rfl: 1     HERBALS, , Disp: , Rfl:      ibuprofen (ADVIL/MOTRIN) 600 MG tablet, Take 1 tablet (600 mg) by mouth every 6 hours as needed for moderate pain, Disp: 30 tablet, Rfl: 1     levonorgestrel-ethinyl estradiol (AVIANE/ALESSE/LESSINA) 0.1-20 MG-MCG tablet, Take 1 tablet by mouth daily, Disp: 84 tablet, Rfl: 4     norethindrone (MICRONOR) 0.35 MG tablet, Take 1 tablet (0.35 mg) by mouth daily, Disp: 84 tablet, Rfl: 3     Prenatal Vit-Fe Fumarate-FA (PRENATAL PO), Take by mouth daily, Disp: , Rfl:      probiotic CAPS, , Disp: , Rfl:      VITAMIN D, CHOLECALCIFEROL, PO, Take by mouth daily, Disp: , Rfl:      docusate sodium (COLACE) 100 MG capsule, Take 100 mg by mouth 2 times daily, Disp: , Rfl:      No Known  "Allergies     Past Medical History:   Diagnosis Date     Allergic rhinitis      Hematuria 2017     Ruptured ear drum, right 2016        ROS: 10 point review of symptoms are negative other than the symptoms noted above in the HPI.     Family History has been reviewed with the patient, there are no pertinent findings to presenting concern.     Past Surgical History:   Procedure Laterality Date      SECTION N/A 2019    Procedure:  SECTION;  Surgeon: Dotty España MD;  Location: [facility]     TONSILLECTOMY      2002        Social History     Tobacco Use     Smoking status: Former Smoker     Packs/day: 0.50     Years: 10.00     Pack years: 5.00     Types: Cigarettes     Start date: 2006     Smokeless tobacco: Never Used     Tobacco comment: 2018   Substance Use Topics     Alcohol use: No     Alcohol/week: 1.0 - 3.0 standard drinks     Drug use: No        OBJECTIVE:   /81   Pulse 96   Temp 98.1  F (36.7  C) (Oral)   Resp 18   Ht 1.6 m (5' 3\")   Wt 82.1 kg (181 lb)   SpO2 96%   BMI 32.06 kg/m     Body mass index is 32.06 kg/m .     Imaging:     None    Exam:     Patient appears comfortable, conversational, and in no apparent distress.   Head: Normocephalic, without obvious abnormality, atraumatic, no facial asymmetry.   Eyes: conjunctivae/corneas clear. PERRL, EOM's intact.   Throat: lips, mucosa, and tongue normal; teeth and gums normal.   Neck: supple, symmetrical, trachea midline, no adenopathy and thyroid: not enlarged, symmetric, no tenderness/mass/nodules.   Lungs: clear to auscultation bilaterally.   Heart: regular rate and rhythm.   Abdomen: soft, non-tender; bowel sounds normal; no masses, no organomegaly.   Pulses: 2+ and symmetric.   Skin: Skin color, texture, turgor normal. No rashes or lesions.     CN II-XII grossly intact, alert and appropriate with conversation and following commands.   Gait is non-antalgic. Able to tandem walk. Able to walk on toes and heels " without difficulty.   Cervical spine is non tender to palpation. Appropriate range of motion of neck, not concerning for lhermitte's phenomenon.   Bilateral bicep 2/4 and tricep reflexes 1/4. Sensation intact throughout upper extremities.     UE muscle strength  Right  Left    Deltoid  5/5  5/5    Biceps  5/5  5/5    Triceps  5/5  5/5    Hand intrinsics  5/5  5/5    Hand grasp  5/5  5/5    Gonzales signs  neg  neg      Lumbar spine is non tender to palpation   Intact sensation throughout lower extremities.   Bilateral patellar 2/4 and achilles reflex 1/4. Negative for pain with straight leg raise.     LE muscle strength  Right  Left    Iliopsoas (hip flexion)  5/5  5/5    Quad (knee extension)  5/5  5/5    Hamstring (knee flexion)  5/5  5/5    Gastrocnemius (PF)  5/5  5/5    Tibialis Ant. (DF)  5/5  5/5    EHL  5/5  5/5      Negative Babinski bilaterally. Negative for clonus.   Calves are soft and non-tender bilaterally.     ASSESSMENT/PLAN:     Vianca Valle is a 35 year old female who presents to the clinic for consultation on lumbar spine pain and stiffness, there are no radicular symptms. There is no imaging to review and her exam is normal. I believe that her symptoms are not necessarily caused by any structure concerns, but more likely due to postural and body mechanics. The patient has not attempted any conservative management.     We feel that it would be in Ms. Valle's best interest to try a conservative approach by participating in a program initiated by our colleagues at the [facility]. She did inquire about possible treatment options that [facility] may consider so we briefly discussed core stretching and strengthening exercises, but again, we explained that treatments will be determined by the team at [facility]. We would like to see her back in three months if needed to further discuss other interventions to include obtaining a lumbar MRI.     In the event that patient's symptoms worsen  or change we would like to see her sooner. We also discussed signs of a worsening problem that she should seek being evaluated.       Respectfully,     [facility], PATRICK  [facility] Neurosurgery  [facility]  Tel: [phone]  Pager: [phone]     Exam, imaging, and plan reviewed by Dr. Black.

## 2020-07-17 ENCOUNTER — THERAPY VISIT (OUTPATIENT)
Dept: PHYSICAL THERAPY | Facility: CLINIC | Age: 35
End: 2020-07-17
Payer: COMMERCIAL

## 2020-07-17 DIAGNOSIS — M54.50 CHRONIC MIDLINE LOW BACK PAIN WITHOUT SCIATICA: ICD-10-CM

## 2020-07-17 DIAGNOSIS — M54.6 BILATERAL THORACIC BACK PAIN: ICD-10-CM

## 2020-07-17 DIAGNOSIS — G89.29 CHRONIC MIDLINE LOW BACK PAIN WITHOUT SCIATICA: ICD-10-CM

## 2020-07-17 PROCEDURE — 97161 PT EVAL LOW COMPLEX 20 MIN: CPT | Mod: GP | Performed by: PHYSICAL THERAPIST

## 2020-07-17 PROCEDURE — 97110 THERAPEUTIC EXERCISES: CPT | Mod: GP | Performed by: PHYSICAL THERAPIST

## 2020-07-17 NOTE — PROGRESS NOTES
[facility] Initial Evaluation  Subjective:  The history is provided by the patient. No  was used.   Patient Health History  Vianca Valle being seen for Back pain.     Problem began: 2020.      Pain is reported as 3/10 on pain scale.  General health as reported by patient is good.  Pertinent medical history includes: pain at night/rest.   Red flags:  None as reported by patient.  Medical allergies: none.   Surgeries include:  Other. Other surgery history details:  section.    Current medications:  Other. Other medications details: birth control pills.       Primary job tasks include:  Repetitive tasks.                  Therapist Generated HPI Evaluation  Problem details: Patient reports that she is having pain at night in the midback around bra line, goes into shoulder blades.  Some low back pain this can travel into the low back.  Worse with flexion, bending, waking at night, work, lifting.  Better with movement.    Tried some stretching but nothing is making this better.  1 child 1.5 years.    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   Hobbies: just bought a house.  Likes to watch TV and use phone before bed in a slouched position. .         Type of problem:  Thoracic.            Pain is described as aching, shooting and stabbing and is intermittent.  Pain is the same all the time.  Since onset symptoms are gradually worsening.  Symptoms are exacerbated by bending, carrying and lifting  and relieved by activity/movement.      Restrictions due to condition include:  Working in normal job without restrictions.  Barriers include:  None as reported by patient.                        Objective:  System              Cervical/Thoracic Evaluation    AROM:  AROM Cervical:    Flexion:            Min restriction  Extension:       Min restriction  Rotation:         Left: Min restriction     Right: Min restriction  Side Bend:      Left: Min restriction     Right:  Min  restriction  AROM Thoracic:    Flexion:               Min restriction  Extension:          Mod restriction  Rotation:            Left: Min restriction     Right: Min restriction    Strength: mid trap 4/5.  Headaches: none  Cervical Myotomes:  normal                  DTR's:  not assessed          Cervical Dermatomes:  normal                    Cervical Palpation:    Tenderness present at Left:    Erector Spinae  Tenderness present at Right:    Erector Spinae      Spinal Segmental Conclusions:    Level:  Hypo at T11, T9, T10, T8, T7, T6, T12, T5 and T4      Cord Sign:  normal                                            General     ROS    Assessment/Plan:    Patient is a 35 year old female with thoracic complaints.    Patient has the following significant findings with corresponding treatment plan.                Diagnosis 1:  Thoracic spine pain  Pain -  hot/cold therapy, US, mechanical traction, manual therapy, self management, education, directional preference exercise and home program  Decreased ROM/flexibility - manual therapy, therapeutic exercise and home program  Decreased joint mobility - manual therapy, therapeutic exercise and home program  Decreased strength - therapeutic exercise, therapeutic activities and home program  Impaired muscle performance - neuro re-education and home program  Decreased function - therapeutic activities and home program  Impaired posture - neuro re-education and home program    Therapy Evaluation Codes:     Cumulative Therapy Evaluation is: Low complexity.    Previous and current functional limitations:  (See Goal Flow Sheet for this information)    Short term and Long term goals: (See Goal Flow Sheet for this information)     Communication ability:  Patient appears to be able to clearly communicate and understand verbal and written communication and follow directions correctly.  Treatment Explanation - The following has been discussed with the patient:   RX ordered/plan of  care  Anticipated outcomes  Possible risks and side effects  This patient would benefit from PT intervention to resume normal activities.   Rehab potential is excellent.    Frequency:  1 X week, once daily  Duration:  for 8 weeks  Discharge Plan:  Achieve all LTG.  Independent in home treatment program.  Reach maximal therapeutic benefit.    Please refer to the daily flowsheet for treatment today, total treatment time and time spent performing 1:1 timed codes.

## 2020-09-16 PROBLEM — M54.6 BILATERAL THORACIC BACK PAIN: Status: RESOLVED | Noted: 2020-07-17 | Resolved: 2020-09-16

## 2020-12-20 ENCOUNTER — HEALTH MAINTENANCE LETTER (OUTPATIENT)
Age: 35
End: 2020-12-20

## 2020-12-29 ENCOUNTER — TELEPHONE (OUTPATIENT)
Dept: OBGYN | Facility: CLINIC | Age: 35
End: 2020-12-29

## 2020-12-29 NOTE — TELEPHONE ENCOUNTER
----- Message from Kandis Bartlett RN sent at 12/29/2020 10:05 AM CST -----  Regarding: Covid Vaccine  Pt has opportunity to receive covid vaccine at noon 12/29,  Pt is trying to conceive.  Has question

## 2020-12-29 NOTE — TELEPHONE ENCOUNTER
Reviewed [facility] position on vaccine for pregnancy and nursing.  She was advised by adiministration staff to delay conception for 2 months.  She will consider her options, as she doesn't want to delay pregnancy further due to her age.

## 2021-04-18 ENCOUNTER — HEALTH MAINTENANCE LETTER (OUTPATIENT)
Age: 36
End: 2021-04-18

## 2021-04-29 ENCOUNTER — PRENATAL OFFICE VISIT (OUTPATIENT)
Dept: NURSING | Facility: CLINIC | Age: 36
End: 2021-04-29
Payer: COMMERCIAL

## 2021-04-29 DIAGNOSIS — Z34.90 SUPERVISION OF NORMAL PREGNANCY: Primary | ICD-10-CM

## 2021-04-29 PROCEDURE — 99207 PR NO CHARGE NURSE ONLY: CPT

## 2021-04-29 SDOH — ECONOMIC STABILITY: TRANSPORTATION INSECURITY
IN THE PAST 12 MONTHS, HAS LACK OF TRANSPORTATION KEPT YOU FROM MEETINGS, WORK, OR FROM GETTING THINGS NEEDED FOR DAILY LIVING?: NOT ASKED

## 2021-04-29 SDOH — ECONOMIC STABILITY: FOOD INSECURITY: WITHIN THE PAST 12 MONTHS, THE FOOD YOU BOUGHT JUST DIDN'T LAST AND YOU DIDN'T HAVE MONEY TO GET MORE.: NOT ASKED

## 2021-04-29 SDOH — ECONOMIC STABILITY: INCOME INSECURITY: HOW HARD IS IT FOR YOU TO PAY FOR THE VERY BASICS LIKE FOOD, HOUSING, MEDICAL CARE, AND HEATING?: NOT ASKED

## 2021-04-29 SDOH — ECONOMIC STABILITY: FOOD INSECURITY: WITHIN THE PAST 12 MONTHS, YOU WORRIED THAT YOUR FOOD WOULD RUN OUT BEFORE YOU GOT MONEY TO BUY MORE.: NOT ASKED

## 2021-04-29 SDOH — ECONOMIC STABILITY: TRANSPORTATION INSECURITY
IN THE PAST 12 MONTHS, HAS THE LACK OF TRANSPORTATION KEPT YOU FROM MEDICAL APPOINTMENTS OR FROM GETTING MEDICATIONS?: NOT ASKED

## 2021-04-29 NOTE — NURSING NOTE
NPN nurse visit done over the phone. Pt will be given NPN folder and book at her upcoming appt.   Discussed optional screening available to assess chromosomal anomalies. Questions answered. Pt advised to call the clinic if she has any questions or concerns related to her pregnancy. Prenatal labs will be obtained at her upcoming appt. New prenatal visit scheduled on  with Josie Wall.    I did advise the pt that she should see the md's for pn care due to hx of . She will make her 2nd appt with an md.    8w0d    Lab Results   Component Value Date    PAP NIL 2017           Patient supplied answers from flow sheet for:  Prenatal OB Questionnaire.  Past Medical History  Have you ever recieved care for your mental health? : (!) Yes(high school)  Have you ever been in a major accident or suffered serious trauma?: (P) No  Within the last year, has anyone hit, slapped, kicked or otherwise hurt you?: (P) No  In the last year, has anyone forced you to have sex when you didn't want to?: (P) No    Past Medical History 2   Have you ever received a blood transfusion?: (P) No  Would you accept a blood transfusion if was medically recommended?: (P) Yes  Does anyone in your home smoke?: (P) No   Is your blood type Rh negative?: (P) No  Have you ever ?: (!) (P) Yes  Have you been hospitalized for a nonsurgical reason excluding normal delivery?: (P) No  Have you ever had an abnormal pap smear?: No    Past Medical History (Continued)  Do you have a history of abnormalities of the uterus?: (P) No  Did your mother take PAULA or any other hormones when she was pregnant with you?: (P) No  Do you have any other problems we have not asked about which you feel may be important to this pregnancy?: (P) No       Tiesha PALMA RN

## 2021-05-12 ENCOUNTER — ANCILLARY PROCEDURE (OUTPATIENT)
Dept: ULTRASOUND IMAGING | Facility: CLINIC | Age: 36
End: 2021-05-12
Payer: COMMERCIAL

## 2021-05-12 ENCOUNTER — PRENATAL OFFICE VISIT (OUTPATIENT)
Dept: OBGYN | Facility: CLINIC | Age: 36
End: 2021-05-12
Payer: COMMERCIAL

## 2021-05-12 VITALS — SYSTOLIC BLOOD PRESSURE: 124 MMHG | DIASTOLIC BLOOD PRESSURE: 86 MMHG | WEIGHT: 182 LBS | BODY MASS INDEX: 32.24 KG/M2

## 2021-05-12 DIAGNOSIS — Z34.90 SUPERVISION OF NORMAL PREGNANCY: ICD-10-CM

## 2021-05-12 DIAGNOSIS — O30.001 TWIN GESTATION IN FIRST TRIMESTER, UNSPECIFIED MULTIPLE GESTATION TYPE: Primary | ICD-10-CM

## 2021-05-12 LAB
ABO + RH BLD: NORMAL
ABO + RH BLD: NORMAL
BLD GP AB SCN SERPL QL: NORMAL
BLOOD BANK CMNT PATIENT-IMP: NORMAL
ERYTHROCYTE [DISTWIDTH] IN BLOOD BY AUTOMATED COUNT: 12.9 % (ref 10–15)
HCT VFR BLD AUTO: 39.7 % (ref 35–47)
HGB BLD-MCNC: 13.2 G/DL (ref 11.7–15.7)
MCH RBC QN AUTO: 29.9 PG (ref 26.5–33)
MCHC RBC AUTO-ENTMCNC: 33.2 G/DL (ref 31.5–36.5)
MCV RBC AUTO: 90 FL (ref 78–100)
PLATELET # BLD AUTO: 280 10E9/L (ref 150–450)
RBC # BLD AUTO: 4.42 10E12/L (ref 3.8–5.2)
SPECIMEN EXP DATE BLD: NORMAL
WBC # BLD AUTO: 11.5 10E9/L (ref 4–11)

## 2021-05-12 PROCEDURE — 86762 RUBELLA ANTIBODY: CPT | Performed by: ADVANCED PRACTICE MIDWIFE

## 2021-05-12 PROCEDURE — 86850 RBC ANTIBODY SCREEN: CPT | Performed by: ADVANCED PRACTICE MIDWIFE

## 2021-05-12 PROCEDURE — 86901 BLOOD TYPING SEROLOGIC RH(D): CPT | Performed by: ADVANCED PRACTICE MIDWIFE

## 2021-05-12 PROCEDURE — 76801 OB US < 14 WKS SINGLE FETUS: CPT | Performed by: OBSTETRICS & GYNECOLOGY

## 2021-05-12 PROCEDURE — 87389 HIV-1 AG W/HIV-1&-2 AB AG IA: CPT | Performed by: ADVANCED PRACTICE MIDWIFE

## 2021-05-12 PROCEDURE — 99000 SPECIMEN HANDLING OFFICE-LAB: CPT | Performed by: ADVANCED PRACTICE MIDWIFE

## 2021-05-12 PROCEDURE — 99207 PR FIRST OB VISIT: CPT | Performed by: ADVANCED PRACTICE MIDWIFE

## 2021-05-12 PROCEDURE — 87086 URINE CULTURE/COLONY COUNT: CPT | Performed by: ADVANCED PRACTICE MIDWIFE

## 2021-05-12 PROCEDURE — 87591 N.GONORRHOEAE DNA AMP PROB: CPT | Performed by: ADVANCED PRACTICE MIDWIFE

## 2021-05-12 PROCEDURE — 85027 COMPLETE CBC AUTOMATED: CPT | Performed by: ADVANCED PRACTICE MIDWIFE

## 2021-05-12 PROCEDURE — 76802 OB US < 14 WKS ADDL FETUS: CPT | Performed by: OBSTETRICS & GYNECOLOGY

## 2021-05-12 PROCEDURE — 86780 TREPONEMA PALLIDUM: CPT | Mod: 90 | Performed by: ADVANCED PRACTICE MIDWIFE

## 2021-05-12 PROCEDURE — 36415 COLL VENOUS BLD VENIPUNCTURE: CPT | Performed by: ADVANCED PRACTICE MIDWIFE

## 2021-05-12 PROCEDURE — 87340 HEPATITIS B SURFACE AG IA: CPT | Performed by: ADVANCED PRACTICE MIDWIFE

## 2021-05-12 PROCEDURE — 87491 CHLMYD TRACH DNA AMP PROBE: CPT | Performed by: ADVANCED PRACTICE MIDWIFE

## 2021-05-12 PROCEDURE — 86900 BLOOD TYPING SEROLOGIC ABO: CPT | Performed by: ADVANCED PRACTICE MIDWIFE

## 2021-05-12 RX ORDER — CETIRIZINE HYDROCHLORIDE 10 MG/1
10 TABLET ORAL DAILY
COMMUNITY
End: 2021-08-18

## 2021-05-12 NOTE — PATIENT INSTRUCTIONS
"  Patient Education     Understanding Multiple Pregnancy  Finding out you re pregnant with twins or more can be both exciting and scary. You might be wondering, \"How will this pregnancy be different than what I was expecting?\" and \"How will this affect my health and my babies  health?\"   Multiple pregnancy can happen when couples use assistive reproductive techniques such as artificial insemination. Or it can happen naturally. Sometimes twins run in the family. In any case, a multiple pregnancy is usually identified early in the pregnancy so that good prenatal care can begin.   Prenatal care for women pregnant with multiples  A multiple pregnancy is considered a high-risk pregnancy, because of the increased risk for problems. Prenatal care may involve more visits with your healthcare provider, more tests, and planning for possible problems such as an early delivery. Prenatal care for multiple pregnancy involves:     More prenatal visits. This helps your healthcare provider closely monitor your health and the health of your babies. It s important to check their growth and development in the womb. Your healthcare provider will also watch for complications, and monitor your weight and nutritional status.    Frequent ultrasounds. These are used to check fetal growth and the volume of amniotic fluid. Starting your second trimester, you may have ultrasounds every 4 to 6 weeks.    Tests to check your health. This includes earlier glucose screening to check for gestational diabetes and testing for iron-deficiency anemia. Your healthcare provider will also check your blood pressure often.    Other tests to check babies  health. These may include amniocentesis or chorionic villus sampling (CVS) to test for birth defects, or a non-stress test to check fetal heart rate.    Preparing for delivery. Multiple pregnancy increases the risk for  birth and  delivery. It s important to discuss these possibilities with " your healthcare provider.  You ll also need guidance on nutrition, prenatal vitamins, activity, and expected weight gain during your prenatal visits.   Complications of being pregnant with multiples  Being pregnant with twins or more increases your risk for nearly every complication of pregnancy. This is why extra prenatal care is needed. Many of the possible complications can be managed, especially if found early.   During pregnancy    Low red blood cell count (anemia)    Gestational diabetes    Gestational high blood pressure (preeclampsia)    Severe morning sickness and fatigue     birth (before 37 weeks)    Baby is not gaining weight normally (fetal growth restriction)    Monochorionic babies. This is when babies share a placenta and amniotic sac. This can increase the risk for twin-to-twin transfusion syndrome (TTTS). This is unbalanced blood flow between the twins.  After birth    Health problems of premature babies and NICU care    Postpartum depression    Heavy bleeding after birth (postpartum hemorrhage)    Low birth weight    Birth defects    When to call your healthcare provider  Call your healthcare provider right away if you have any of these:     Fever    Symptoms of postpartum depression    Heavy bleeding    Eclampsia symptoms    Headaches    Increased in blood pressure  [place] last reviewed this educational content on 2020-2021 [employer]. All rights reserved. This information is not intended as a substitute for professional medical care. Always follow your healthcare professional's instructions.    Genetic Screening in Pregnancy    There are several options you have for genetic screening in your pregnancy.  Everyone has their own personal reasons to screen or not to screen.  We want you to make the best choice for you and your pregnancy.  Below is a list of options, what they screen for and when the screening is done.  Genetic screening is recommended for women  "who are 35 and older at delivery and for those with a family history of chromosomal abnormalities. However, screening is offered to all women.    Innatal:      This is a screening for the more common chromosome abnormalities, including trisomy 21 (Down's syndrome), trisomy 18, trisomy 13 and sex chromosome abnormalities. This is a prenatal test that can be done as early as 10 weeks gestation.       A maternal blood sample is drawn that sequences cell-free DNA in maternal plasma. This in turn allows for molecular counting of chromosome copy numbers with a >99% detection rate of Down syndrome, a 97% detection rate of Trisomy 18, and a 78% detection rate of Trisomy 13.    This test will give information about the gender of the baby.  You can choose to not have that disclosed.       Results come back within 10-14 days.     About 5% of samples will have results that are designated as \"indeterminate\" or \"uninterruptable.\" With this type of result, genetic counseling and diagnostic testing are recommended. Remember this is a screening test and does not definitively diagnose or exclude the presence of these chromosome conditions.     This screening may or may not be covered by insurance.  We recommend you consult your insurance company to discuss.  Financial assistance is available at a low cost if not covered by your insurance.         Hemoglobin Electrophoresis:  Hemoglobin electrophoresis is a non-invasive blood test that looks at abnormal hemoglobin (component of red blood cells) production. Examples of this include sickle cell anemia (which is a disorder of the red blood cells and their shape) and the thalassemia s (which is also a form of anemia).  High risk groups include:  , Southeast Asians, , Mediterranean, [place], South and Central American and [place] descent. This blood test is usually done with your first OB labs.  This is a one-time test.      Trio Carrier Screen:   1.  Cystic Fibrosis " (CF) is the most common life-shortening autosomal  recessive disease among  populations, with a frequency of 1 in  Every 3,500 live births. Although it mainly affects the   Population anyone can request screening. If you have a family history of  cystic fibrosis you should request this test.  This screening is a blood draw      2.  Spinal Muscular Atrophy (SMA) is the most common inherited cause  of infant death.  The most common form of this disorder causes death by a age two.  One in every 6,000 to 10,000 babies born in the  has SMA      3.  Fragile X Syndrome (FXS) is the most common inherited cause of  intellectual disability.  Approximately 1 in every 3,600 boys and 1 in every  6,000 girls is born with FXS      **If you are a carrier of CF or SMA, your partner will also need to be tested. This partner testing is offered free of charge.  This screen can be done prior to pregnancy or at any time during the pregnancy.      Quad Screen:    The quad screen is a quadruple marker screening test done during the second trimester of your pregnancy. This prenatal screening test that measures levels of four substances in a pregnant woman's blood; Alpha-fetoprotein (AFP), Human chorionic gonadotropin (HCG), Estriol, and Inhibin A.     AFP screens for open neural tube defects like Spina Bifida and Anencephaly.     Spina bifida is a serious birth defect that occurs when a portion of the neural tube fails to develop or close properly, causing defects in the spinal cord and in the bones of the spine.     Anencephaly is a serious birth defect in the closure of the neural tube, resulting in an underdeveloped brain and an incomplete skull.     Human chorionic gonadotropin (HCG), Estriol, and Inhibin screen for Down syndrome (trisomy 21) and Trisomy 18.     Down syndrome is a chromosomal disorder that causes lifelong intellectual disability and developmental delays and, in some people, health problems    Trisomy  18 is a chromosomal disorder that causes severe developmental delays and anatomic abnormalities. It is often fatal by age 1.    The screening is ideally done between 15 and 18 weeks of pregnancy but can be done up to week 20. Even if you have done the Ver[employer] testing you can still get a single AFP drawn to check for neural tube defects.       Screening Ultrasound:    This is a fetal survey done around 20 weeks gestation.  This screen will look at the cardiac activity, fetal heart rate and rhythm, assessment of the amniotic fluid volume, placenta appearance and location, and the umbilical cord vessel number and placental insertion site.  They also do many fetal measurements to make sure the baby is growing properly.  The cervical length is also measured and fetal movement is evaluated.  The sex of the baby can usually be determined.      In the event of a positive screen:    There are two diagnostic tests available if any screening tests come back with an increased risk.  You would first be referred to Maternal Fetal Medicine to be seen by a genetic counselor.  They would assess your risk and see if further diagnostic testing is warranted.  The options are; chorionic villus sampling (CVS), usually done at 11 to 12 weeks of pregnancy and amniocentesis which is generally done later in pregnancy around 15 to 20 weeks.  All risks/benefits would be explained and you can decide what course of action is best for you and your family.    Why you might choose to screen?    A desire to know as much as you can about your baby    If your baby had a genetic abnormality you can learn about it before they are born    Choose whether to continue the pregnancy or to terminate    Why you might choose to NOT screen?    You feel that whatever happens is fine and you would not terminate    You know you don't want to do any diagnostic tests even if the screening test showed a high possibility of a genetic abnormality        For further  information please call:  [facility]  [phone]

## 2021-05-12 NOTE — NURSING NOTE
"Chief Complaint   Patient presents with     Prenatal Care     NPN 9w 6d, no concerns       Initial /86 (BP Location: Right arm, Cuff Size: Adult Regular)   Wt 82.6 kg (182 lb)   LMP 2021 (Exact Date)   BMI 32.24 kg/m   Estimated body mass index is 32.24 kg/m  as calculated from the following:    Height as of 20: 1.6 m (5' 3\").    Weight as of this encounter: 82.6 kg (182 lb).  BP completed using cuff size: regular long    Questioned patient about current smoking habits.  Pt. quit smoking some time ago.          The following HM Due: Lázaro ()  Yael Cain CMA    "

## 2021-05-12 NOTE — PROGRESS NOTES
Vianca Valle is a 36 year old   woman,  who is not a previous CNM patient. She presents for a new OB Visit. This was a planned pregnancy.     FOB is Hang who is in good health.  FOB IS actively involved in relationship and this pregnancy.     Glenda present today for their first OB visit. They found out at their ultrasound today that they are having twins. They have one son at home.  She has not had bleeding since her LMP.    She denies abdominal pain since her LMP.  She has had nausea.  has not had vomiting.  Any personal or family history of blood clots? No  History of sickle cell anemia or trait? No         Patient's last menstrual period was 2021 (exact date)..  Estimated Date of Delivery: Dec 9, 2021 Ultrasound consistent with LMP.    MENSTRUAL HISTORY    frequency: every 28-30 days  Last PAP/HPV:  2017  History of abnormal Pap?  No    Health maintenance updated:  yes        Current medications are:    Current Outpatient Medications:      cetirizine (ZYRTEC) 10 MG tablet, Take 10 mg by mouth daily, Disp: , Rfl:      Docosahexaenoic Acid (DHA PO), , Disp: , Rfl:      Prenatal Vit-Fe Fumarate-FA (PRENATAL PO), Take by mouth daily, Disp: , Rfl:      probiotic CAPS, , Disp: , Rfl:      docusate sodium (COLACE) 100 MG capsule, Take 100 mg by mouth 2 times daily, Disp: , Rfl:      What medications are you currently taking? See above    INFECTION HISTORY  HIV: No  Hepatitis B: No  Hepatitis C: No  Tuberculosis: No    Genital Herpes self: no  Herpes partner:  no  Chlamydia:  no  Gonorrhea:  no  HPV: No  BV:  No  Syphilis:  No  Chicken Pox:  Yes - age 9      OB HISTORY  OB History    Para Term  AB Living   2 1 1 0 0 1   SAB TAB Ectopic Multiple Live Births   0 0 0 0 1      # Outcome Date GA Lbr Jeronimo/2nd Weight Sex Delivery Anes PTL Lv   2 Current            1 Term 19 41w0d 28:25 3.062 kg (6 lb 12 oz) M CS-LTranv EPI N JOSE      Complications:  Failure to Progress in Second Stage, Fetal Intolerance      Name: Jules      Apgar1: 9  Apgar5: 9       History of GDM: No,  PTL : No,  History of HTN in pregnancy: No,  Thrombocytopenia: No,   Section,- OP  Other complications: No    PERSONAL HISTORY  Exercise Habits:  none   Employment: Full time.  Her job involves light activity with little potential for toxic exposure.    Travel plans:  are none planned.   Diet: follows a balanced nutrition diet  Prenatal vitamins? Yes:   Fish Oil? Yes:   Abuse concerns? Not addressed  Any history if abuse, varbal, physical, sexual? Not addressed as partner present  Hgb A1c screen:  BMI > 30: Yes, 1st degree family DM: No, History of GDM: No, PCOS: No, High risk ethnicity: No    Social History     Socioeconomic History     Marital status:      Spouse name: Bartolo     Number of children: 1     Years of education: college     Highest education level: Not on file   Occupational History     Comment: [employer], FT   Social Needs     Financial resource strain: Not on file     Food insecurity     Worry: Not on file     Inability: Not on file     Transportation needs     Medical: Not on file     Non-medical: Not on file   Tobacco Use     Smoking status: Former Smoker     Packs/day: 0.50     Years: 10.00     Pack years: 5.00     Types: Cigarettes     Start date: 2006     Smokeless tobacco: Never Used     Tobacco comment: 2018   Substance and Sexual Activity     Alcohol use: No     Alcohol/week: 1.0 - 3.0 standard drinks     Drug use: No     Sexual activity: Yes     Partners: Male     Comment: monogamous relationship since May 2013   Lifestyle     Physical activity     Days per week: Not on file     Minutes per session: Not on file     Stress: Not on file   Relationships     Social connections     Talks on phone: Not on file     Gets together: Not on file     Attends [employer] service: Not on file     Active member of club or organization: Not on file     Attends  meetings of clubs or organizations: Not on file     Relationship status: Not on file     Intimate partner violence     Fear of current or ex partner: Not on file     Emotionally abused: Not on file     Physically abused: Not on file     Forced sexual activity: Not on file   Other Topics Concern     Not on file   Social History Narrative    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     How much exercise per week? Not much daily    How much calcium per day? 2-3 servings       How much caffeine per day? 1 cup coffee    How much vitamin D per day?   occ supplements    Do you/your family wear seatbelts?  Yes    Do you/your family use safety helmets? N/A    Do you/your family use sunscreen? Yes    Do you/your family keep firearms in the home? No    Do you/your family have a smoke detector(s)? Yes        Do you feel safe in your home? Yes    Has anyone ever touched you in an unwanted manner? No     Explain         2014 Ravinder Kuhn LPN    Reviewed [facility] lpn 2017              She  reports that she has quit smoking. Her smoking use included cigarettes. She started smoking about 14 years ago. She has a 5.00 pack-year smoking history. She has never used smokeless tobacco.    STD testing offered?  Accepted  Last PHQ-9 score on record =   PHQ-9 SCORE 2019   PHQ-9 Total Score 2     Last GAD7 score on record =   MARY-7 SCORE 2019   Total Score -   Total Score 0     Alcohol Score = 0  Referral/Meds needed? no    PAST MEDICAL/SURGICAL HISTORY  Past Medical History:   Diagnosis Date     Allergic rhinitis      Depressive disorder      Hematuria 2017     Ruptured ear drum, right 2016     Urinary tract infection      Varicella      Venereal warts      Past Surgical History:   Procedure Laterality Date      SECTION N/A 2019    Procedure:  SECTION;  Surgeon: Dotty España MD;  Location:  L+D     TONSILLECTOMY      2002       FAMILY  HISTORY  Family History   Problem Relation Age of Onset     Thyroid Disease Maternal Grandmother      DANIEL Maternal Grandfather      Diabetes Paternal Grandmother          ROS:  CONSTITUTIONAL: NEGATIVE for fever, chills, change in weight  INTEGUMENTARU/SKIN: NEGATIVE for worrisome rashes, moles or lesions  EYES: NEGATIVE for vision changes or irritation  ENT: NEGATIVE for ear, mouth and throat problems  RESP: NEGATIVE for significant cough or SOB  BREAST: NEGATIVE for masses, tenderness or discharge  CV: NEGATIVE for chest pain, palpitations or peripheral edema  GI: NEGATIVE for nausea, abdominal pain, heartburn, or change in bowel habits  : NEGATIVE for unusual urinary or vaginal symptoms. Periods are regular.  MUSCULOSKELETAL: NEGATIVE for significant arthralgias or myalgia  NEURO: NEGATIVE for weakness, dizziness or paresthesias  ENDOCRINE: NEGATIVE for temperature intolerance, skin/hair changes  HEME/ALLERGY/IMMUNE: NEGATIVE for bleeding problems  PSYCHIATRIC: NEGATIVE for changes in mood or affect    PHYSICAL EXAM  Vitals: /86 (BP Location: Right arm, Cuff Size: Adult Regular)   Wt 82.6 kg (182 lb)   LMP 2021 (Exact Date)   BMI 32.24 kg/m    BMI= Body mass index is 32.24 kg/m .     GENERAL:  36 year old pleasant pregnant female, alert, cooperative and well groomed.  NECK:  Thyroid without enlargement and nodules.  Lymph nodes not palpable.   LUNGS:  Clear to auscultation.  BREAST:  Symmetrical without lesions or nodes.  Nipples everted.  Areolas symmetric.  No palpable axillary nodes.  HEART:  RRR without murmur.  ABDOMEN: Soft without masses or tenderness.  Well healed scar from  section.  GENITALIA: BUS without tenderness or inflammation.  Perineum without lesions.    VAGINA:  Pink, normal rugae and discharge.  CERVIX:  Anterior, smooth, without discharge, and firm/ closed 4 cm long.   UTERUS: Anteverted, nontender 11 weeks in size.  ADNEXA: Without masses or tenderness  RECTAL:   Normal appearance.  Digital exam deferred.  LOWER EXTREMITIES: No edema. No significant varicosities.    ASSESSMENT/PLAN:    IUP at 9w6d    ICD-10-CM    1. Supervision of normal pregnancy  Z34.90 ABO/Rh type and screen     Hepatitis B surface antigen     CBC with platelets     HIV Antigen Antibody Combo     Rubella Antibody IgG Quantitative     Treponema Abs w Reflex to RPR and Titer     Urine Culture Aerobic Bacterial     NEISSERIA GONORRHOEA PCR     CHLAMYDIA TRACHOMATIS PCR        consult for US for AMA patients: Yes , patient will be [facility] to [facility]  Genetic Testing reviewed and discussed, patient desires to discuss further with her partner and will let us know. Handout provided      COUNSELING    Instructed on use of triage nurse line and contacting the on call MD after hours in an emergency.     Symptoms of N&V and fatigue usually start to resolve around 12-16 weeks     Reviewed practice philosophy, call schedule for labor and delivery, and FR for delivery    1st OB handout given outlining appointment spacing and MD information    Reviewed exercise and nutrition, and need to eat more with twin pregnancy ~ 2700 calories per day    Recommend to gain 35-45 pounds with her pregnancy.    Discussed OTC medications. OB med list given    Encouraged patient to arrange  if needed    Encouraged patient to take PNV's/DHA    Travel precautions discussed, no air travel after 36 weeks and Zika Virus discussed    Will call patient with lab results when available    Facilitated follow up appointment with MD in 2 weeks. Encouraged patient to call with any questions or concerns.      Will return to the clinic in 2 weeks for her next routine prenatal check.  Will call to be seen sooner if problems arise.    AMANDA Soares, CNM

## 2021-05-13 LAB
BACTERIA SPEC CULT: NO GROWTH
C TRACH DNA SPEC QL NAA+PROBE: NEGATIVE
HBV SURFACE AG SERPL QL IA: NONREACTIVE
HIV 1+2 AB+HIV1 P24 AG SERPL QL IA: NONREACTIVE
Lab: NORMAL
N GONORRHOEA DNA SPEC QL NAA+PROBE: NEGATIVE
RUBV IGG SERPL IA-ACNC: 55 IU/ML
SPECIMEN SOURCE: NORMAL
T PALLIDUM AB SER QL: NONREACTIVE

## 2021-05-26 ENCOUNTER — PRENATAL OFFICE VISIT (OUTPATIENT)
Dept: OBGYN | Facility: CLINIC | Age: 36
End: 2021-05-26
Payer: COMMERCIAL

## 2021-05-26 ENCOUNTER — TRANSCRIBE ORDERS (OUTPATIENT)
Dept: MATERNAL FETAL MEDICINE | Facility: CLINIC | Age: 36
End: 2021-05-26

## 2021-05-26 VITALS — WEIGHT: 181.5 LBS | SYSTOLIC BLOOD PRESSURE: 110 MMHG | BODY MASS INDEX: 32.15 KG/M2 | DIASTOLIC BLOOD PRESSURE: 64 MMHG

## 2021-05-26 DIAGNOSIS — O09.521 MULTIGRAVIDA OF ADVANCED MATERNAL AGE IN FIRST TRIMESTER: ICD-10-CM

## 2021-05-26 DIAGNOSIS — O30.041 DICHORIONIC DIAMNIOTIC TWIN PREGNANCY IN FIRST TRIMESTER: ICD-10-CM

## 2021-05-26 DIAGNOSIS — O26.90 PREGNANCY RELATED CONDITION, ANTEPARTUM: Primary | ICD-10-CM

## 2021-05-26 DIAGNOSIS — O09.891 SUPERVISION OF OTHER HIGH RISK PREGNANCIES, FIRST TRIMESTER: Primary | ICD-10-CM

## 2021-05-26 PROCEDURE — 99207 PR PRENATAL VISIT: CPT | Performed by: OBSTETRICS & GYNECOLOGY

## 2021-05-26 NOTE — PROGRESS NOTES
35yo  at 11w6d with Di/Di twin gestation.  MFM referral placed and explained consultative role they will play throughout pregnancy.  Desires NIPT and preparent testing.  HHx of 2nd stage LTCS  -  Would not advise TOLAC and they are in agreement.    Bedside sono performed demonstrating 2 live IUPs.  RTC 4 weeks

## 2021-05-28 ENCOUNTER — PRE VISIT (OUTPATIENT)
Dept: MATERNAL FETAL MEDICINE | Facility: CLINIC | Age: 36
End: 2021-05-28

## 2021-06-03 ENCOUNTER — OFFICE VISIT (OUTPATIENT)
Dept: MATERNAL FETAL MEDICINE | Facility: CLINIC | Age: 36
End: 2021-06-03
Attending: OBSTETRICS & GYNECOLOGY
Payer: COMMERCIAL

## 2021-06-03 ENCOUNTER — HOSPITAL ENCOUNTER (OUTPATIENT)
Dept: LAB | Facility: CLINIC | Age: 36
End: 2021-06-03
Attending: OBSTETRICS & GYNECOLOGY
Payer: COMMERCIAL

## 2021-06-03 ENCOUNTER — HOSPITAL ENCOUNTER (OUTPATIENT)
Dept: ULTRASOUND IMAGING | Facility: CLINIC | Age: 36
End: 2021-06-03
Attending: OBSTETRICS & GYNECOLOGY
Payer: COMMERCIAL

## 2021-06-03 ENCOUNTER — MEDICAL CORRESPONDENCE (OUTPATIENT)
Dept: HEALTH INFORMATION MANAGEMENT | Facility: CLINIC | Age: 36
End: 2021-06-03

## 2021-06-03 DIAGNOSIS — O09.521 MULTIGRAVIDA OF ADVANCED MATERNAL AGE IN FIRST TRIMESTER: ICD-10-CM

## 2021-06-03 DIAGNOSIS — O30.041 DICHORIONIC DIAMNIOTIC TWIN PREGNANCY IN FIRST TRIMESTER: Primary | ICD-10-CM

## 2021-06-03 DIAGNOSIS — O09.521 MULTIGRAVIDA OF ADVANCED MATERNAL AGE IN FIRST TRIMESTER: Primary | ICD-10-CM

## 2021-06-03 DIAGNOSIS — O30.041 DICHORIONIC DIAMNIOTIC TWIN PREGNANCY IN FIRST TRIMESTER: ICD-10-CM

## 2021-06-03 DIAGNOSIS — O26.90 PREGNANCY RELATED CONDITION, ANTEPARTUM: ICD-10-CM

## 2021-06-03 PROCEDURE — 76813 OB US NUCHAL MEAS 1 GEST: CPT | Mod: 26 | Performed by: OBSTETRICS & GYNECOLOGY

## 2021-06-03 PROCEDURE — 76801 OB US < 14 WKS SINGLE FETUS: CPT

## 2021-06-03 PROCEDURE — 96040 HC GENETIC COUNSELING, EACH 30 MINUTES: CPT | Performed by: GENETIC COUNSELOR, MS

## 2021-06-03 PROCEDURE — 76801 OB US < 14 WKS SINGLE FETUS: CPT | Mod: 26 | Performed by: OBSTETRICS & GYNECOLOGY

## 2021-06-03 PROCEDURE — 76802 OB US < 14 WKS ADDL FETUS: CPT | Mod: 26 | Performed by: OBSTETRICS & GYNECOLOGY

## 2021-06-03 PROCEDURE — 76814 OB US NUCHAL MEAS ADD-ON: CPT | Mod: 26 | Performed by: OBSTETRICS & GYNECOLOGY

## 2021-06-03 NOTE — PROGRESS NOTES
"Please see \"Imaging\" tab under \"Chart Review\" for details of today's visit.    Sahil Robert    "

## 2021-06-03 NOTE — PROGRESS NOTES
[facility]  Genetic Counseling Consult    Patient: Vianca Valle YOB: 1985   Date of Service: 21      Vianca Valle was seen at [facility] for genetic consultation to discuss the options for screening and testing for fetal chromosome abnormalities.  The indication for genetic counseling is advanced maternal age.        Impression/Plan:   1.  Vianca had an ultrasound and blood draw for NIPT (Panorama test through [facility]).  Results are expected within 10 days, and will be available in Outright.  We will contact her to discuss the results, and a copy will be forwarded to the office of the referring OB provider. Vianca provided verbal permission for results to be left on her voicemail. She requested the results include predicted fetal sex information.     2.  Maternal serum AFP (single marker screen) is recommended after 15 weeks to screen for open neural tube defects. A quad screen should not be performed.    3.  An 18-20 week comprehensive ultrasound is standard of care for all women 35 or older at delivery.    Pregnancy History:   /Parity:    Age at Delivery: 36 year old  ELICEO: 2021, by Last Menstrual Period  Gestational Age: 13w0d    No significant complications or exposures were reported in the current pregnancy.    Vianca wang pregnancy history is significant for one term c/s delivery.    Medical History:   Vianca wang reported medical history is not expected to impact pregnancy management or risks to fetal development.       Family History:   A three-generation pedigree was previously obtained, see genetic counseling note from 18. The family history was updated today and is scanned under the  Media  tab.     No new significant findings were reported by Vianca. Otherwise, the reported family history is negative for multiple miscarriages, stillbirths, birth defects, intellectual disability,  known genetic conditions, and consanguinity.       Carrier Screening:   The patient reports that she and the father of the pregnancy have  ancestry:     Cystic fibrosis is an autosomal recessive genetic condition that occurs with increased frequency in individuals of  ancestry and carrier screening for this condition is available.  In addition,  screening in the [facility] includes cystic fibrosis.      Expanded carrier screening for mutations in a large panel of genes associated with autosomal recessive conditions including cystic fibrosis, spinal muscular atrophy, and others, is now available.      The patient was not certain about whether to pursue carrier screening today.  She was provided with a brochure about her testing options, and contact information if she has questions or wishes to pursue screening.       Risk Assessment for Chromosome Conditions:   We explained that the risk for fetal chromosome abnormalities increases with maternal age. We discussed specific features of common chromosome abnormalities, including Down syndrome, trisomy 13, trisomy 18, and sex chromosome trisomies.      - At age 36 at midtrimester, the risk to have a baby with Down syndrome is 1 in 168.     - At age 36 at midtrimester, the risk to have a baby with any chromosome abnormality is 1 in 82.     Testing Options:   Dichorionic diamniotic twins have separate placentas and amniotic sacs and have an estimated 20% chance of being monozygotic. We discussed the following options in the setting of a dichorionic diamniotic twin gestation:      First trimester screening    First trimester ultrasound with nuchal translucency and nasal bone assessments, maternal plasma hCG, QUANG-A, and AFP measurement    Screens for fetal trisomy 21, trisomy 13, and trisomy 18    Cannot screen for open neural tube defects; maternal serum AFP after 15 weeks is recommended    First trimester screening is available which  includes an ultrasound and biochemical analysis of serum sample. The ultrasound provides an for nuchal translucency (NT) in each twin. If these measurements are discordant this may help in the interpretation of abnormal screening results. For monochorionic twins an increased NT can also be a feature of twin-twin transfusion syndrome. The biochemical portion of the screen averages the analyte levels for each twin. Sensitivity of first trimester screen is decreased for twin pregnancies.       Non-invasive Prenatal Testing (NIPT)    Maternal plasma cell-free DNA testing; first trimester ultrasound with nuchal translucency and nasal bone assessment is recommended, when appropriate    Screens for fetal trisomy 21, trisomy 13, trisomy 18, sex chromosomes, SCA not available in multiple gestation    Cannot screen for open neural tube defects; maternal serum AFP after 15 weeks is recommended    Discussed benefits and limitations of MPSS platform ([employer]) vs SNP platform ([employer]) for dichorionic twin gestation and Vianca has elected to proceed with Panorama testing through [facility]      Chorionic villus sampling (CVS)    Invasive procedure typically performed in the first trimester by which placental villi are obtained for the purpose of chromosome analysis and/or other prenatal genetic analysis    Diagnostic results; >99% sensitivity for fetal chromosome abnormalities    Cannot test for open neural tube defects; maternal serum AFP after 15 weeks is recommended     Genetic Amniocentesis    Invasive procedure typically performed in the second trimester by which amniotic fluid is obtained for the purpose of chromosome analysis and/or other prenatal genetic analysis    Diagnostic results; >99% sensitivity for fetal chromosome abnormalities    AFAFP measurement tests for open neural tube defects     Comprehensive (Level II) ultrasound: Detailed ultrasound performed between 18-22 weeks gestation to screen for major  birth defects and markers for aneuploidy.      We reviewed the benefits and limitations of this testing.  Screening tests provide a risk assessment specific to the pregnancy for certain fetal chromosome abnormalities, but cannot definitively diagnose or exclude a fetal chromosome abnormality.  Follow-up genetic counseling and consideration of diagnostic testing is recommended with any abnormal screening result.     Diagnostic tests carry inherent risks- including risk of miscarriage- that require careful consideration.  These tests can detect fetal chromosome abnormalities with greater than 99% certainty.  Results can be compromised by maternal cell contamination or mosaicism, and are limited by the resolution of cytogenetic G-banding technology.  There is no screening nor diagnostic test that can detect all forms of birth defects or mental disability.     It was a pleasure to be involved with Vianca hugo. Face-to-face time of the meeting was 20 minutes.    Indigo Evangelista MS, [facility]  Licensed Genetic Counselor  [facility]  Maternal Fetal Medicine  Ph: [phone]  [email or web address]

## 2021-06-06 LAB — MISCELLANEOUS TEST: NORMAL

## 2021-06-10 ENCOUNTER — TELEPHONE (OUTPATIENT)
Dept: MATERNAL FETAL MEDICINE | Facility: CLINIC | Age: 36
End: 2021-06-10

## 2021-06-10 LAB — LAB SCANNED RESULT: NORMAL

## 2021-06-10 NOTE — TELEPHONE ENCOUNTER
Rosi 10, 2021  Left a message for Vianca regarding her NIPT results.     Results indicate NO ANEUPLOIDY DETECTED for chromosomes 21, 18, or 13 in the twin pregnancy.  Results consistent with one male and one female twin.     This puts her current twin pregnancy at low risk for Down syndrome, trisomy 18, and trisomy 13. Although these results are reassuring, this does not replace a standard chromosome analysis from a chorionic villus sampling or amniocentesis.     Level II ultrasound is recommended, given AMA.     MSAFP is the appropriate second trimester screening test for open neural tube defects; the maternal quad screen is not recommended.    Her results are available in her Epic chart for her primary OB to review.    This information was left in Vianca's voicemail per plan established at her genetic counseling appointment, and Vianca was encouraged to reach out if she has any questions or concerns.      Indigo Evangelista MS, formerly [facility]  Licensed Genetic Counselor  [facility]  Maternal Fetal Medicine  Ph: [phone]  [email or web address]

## 2021-06-23 ENCOUNTER — PRENATAL OFFICE VISIT (OUTPATIENT)
Dept: OBGYN | Facility: CLINIC | Age: 36
End: 2021-06-23
Payer: COMMERCIAL

## 2021-06-23 VITALS — WEIGHT: 181.5 LBS | DIASTOLIC BLOOD PRESSURE: 70 MMHG | SYSTOLIC BLOOD PRESSURE: 116 MMHG | BODY MASS INDEX: 32.15 KG/M2

## 2021-06-23 DIAGNOSIS — O30.042 DICHORIONIC DIAMNIOTIC TWIN PREGNANCY IN SECOND TRIMESTER: Primary | ICD-10-CM

## 2021-06-23 DIAGNOSIS — O09.522 MULTIGRAVIDA OF ADVANCED MATERNAL AGE IN SECOND TRIMESTER: ICD-10-CM

## 2021-06-23 PROCEDURE — 99207 PR PRENATAL VISIT: CPT | Performed by: OBSTETRICS & GYNECOLOGY

## 2021-06-23 NOTE — NURSING NOTE
"Chief Complaint   Patient presents with     Prenatal Care   15w6d  Discuss activity    initial /70   Wt 82.3 kg (181 lb 8 oz)   LMP 03/04/2021 (Exact Date)   BMI 32.15 kg/m   Estimated body mass index is 32.15 kg/m  as calculated from the following:    Height as of 6/22/20: 1.6 m (5' 3\").    Weight as of this encounter: 82.3 kg (181 lb 8 oz).  BP completed using cuff size regular.  Renee Reyes CMA    "

## 2021-06-23 NOTE — PROGRESS NOTES
37yo  with Di/Di M/F twins based on NIFT testing.   present today.  Beginning to feel fetal movement.  Has MFM U/S 21.  RTC 21.

## 2021-07-21 ENCOUNTER — HOSPITAL ENCOUNTER (OUTPATIENT)
Dept: ULTRASOUND IMAGING | Facility: CLINIC | Age: 36
End: 2021-07-21
Attending: OBSTETRICS & GYNECOLOGY
Payer: COMMERCIAL

## 2021-07-21 ENCOUNTER — OFFICE VISIT (OUTPATIENT)
Dept: MATERNAL FETAL MEDICINE | Facility: CLINIC | Age: 36
End: 2021-07-21
Attending: OBSTETRICS & GYNECOLOGY
Payer: COMMERCIAL

## 2021-07-21 ENCOUNTER — PRENATAL OFFICE VISIT (OUTPATIENT)
Dept: OBGYN | Facility: CLINIC | Age: 36
End: 2021-07-21
Payer: COMMERCIAL

## 2021-07-21 VITALS — DIASTOLIC BLOOD PRESSURE: 64 MMHG | BODY MASS INDEX: 32.12 KG/M2 | WEIGHT: 181.3 LBS | SYSTOLIC BLOOD PRESSURE: 106 MMHG

## 2021-07-21 DIAGNOSIS — O30.042 DICHORIONIC DIAMNIOTIC TWIN PREGNANCY IN SECOND TRIMESTER: ICD-10-CM

## 2021-07-21 DIAGNOSIS — O09.522 MULTIGRAVIDA OF ADVANCED MATERNAL AGE IN SECOND TRIMESTER: Primary | ICD-10-CM

## 2021-07-21 DIAGNOSIS — O26.90 PREGNANCY RELATED CONDITION, ANTEPARTUM: ICD-10-CM

## 2021-07-21 DIAGNOSIS — Z12.83 SKIN CANCER SCREENING: ICD-10-CM

## 2021-07-21 DIAGNOSIS — O30.042 DICHORIONIC DIAMNIOTIC TWIN PREGNANCY IN SECOND TRIMESTER: Primary | ICD-10-CM

## 2021-07-21 PROCEDURE — 76812 OB US DETAILED ADDL FETUS: CPT | Mod: 26 | Performed by: OBSTETRICS & GYNECOLOGY

## 2021-07-21 PROCEDURE — 76812 OB US DETAILED ADDL FETUS: CPT

## 2021-07-21 PROCEDURE — 76811 OB US DETAILED SNGL FETUS: CPT | Mod: 26 | Performed by: OBSTETRICS & GYNECOLOGY

## 2021-07-21 PROCEDURE — 99207 PR PRENATAL VISIT: CPT | Performed by: OBSTETRICS & GYNECOLOGY

## 2021-07-21 NOTE — PROGRESS NOTES
Please see full imaging report from [facility] program under imaging tab.    Nishi Arteaga MD  Maternal Fetal Medicine

## 2021-07-21 NOTE — PROGRESS NOTES
37 yo  at 19w6d with Di/Di M/F twin gestation presents following L2 U/S in M.  Feels well.  Discussed timing of RCS at 37-38wks.  Discussed lack of weight gain to date not concerning.    RTC 4 weeks

## 2021-08-11 ENCOUNTER — OFFICE VISIT (OUTPATIENT)
Dept: MATERNAL FETAL MEDICINE | Facility: CLINIC | Age: 36
End: 2021-08-11
Attending: OBSTETRICS & GYNECOLOGY
Payer: COMMERCIAL

## 2021-08-11 ENCOUNTER — HOSPITAL ENCOUNTER (OUTPATIENT)
Dept: ULTRASOUND IMAGING | Facility: CLINIC | Age: 36
End: 2021-08-11
Attending: OBSTETRICS & GYNECOLOGY
Payer: COMMERCIAL

## 2021-08-11 ENCOUNTER — LAB (OUTPATIENT)
Dept: LAB | Facility: CLINIC | Age: 36
End: 2021-08-11
Attending: OBSTETRICS & GYNECOLOGY
Payer: COMMERCIAL

## 2021-08-11 DIAGNOSIS — O30.042 DICHORIONIC DIAMNIOTIC TWIN PREGNANCY IN SECOND TRIMESTER: Primary | ICD-10-CM

## 2021-08-11 DIAGNOSIS — O35.9XX1 SUSPECTED FETAL ABNORMALITY AFFECTING MANAGEMENT OF MOTHER, FETUS 1 OF MULTIPLE GESTATION: ICD-10-CM

## 2021-08-11 DIAGNOSIS — O30.042 DICHORIONIC DIAMNIOTIC TWIN PREGNANCY IN SECOND TRIMESTER: ICD-10-CM

## 2021-08-11 PROCEDURE — 86644 CMV ANTIBODY: CPT

## 2021-08-11 PROCEDURE — 36415 COLL VENOUS BLD VENIPUNCTURE: CPT

## 2021-08-11 PROCEDURE — 76820 UMBILICAL ARTERY ECHO: CPT | Mod: 26 | Performed by: OBSTETRICS & GYNECOLOGY

## 2021-08-11 PROCEDURE — 76816 OB US FOLLOW-UP PER FETUS: CPT | Mod: 26 | Performed by: OBSTETRICS & GYNECOLOGY

## 2021-08-11 PROCEDURE — 76816 OB US FOLLOW-UP PER FETUS: CPT | Mod: 59

## 2021-08-11 PROCEDURE — 86645 CMV ANTIBODY IGM: CPT

## 2021-08-11 NOTE — PROGRESS NOTES
Please refer to ultrasound report under 'Imaging' Studies of 'Chart Review' tabs.    Cuba Odell M.D.

## 2021-08-12 LAB
CMV IGG SERPL IA-ACNC: <0.2 U/ML
CMV IGG SERPL IA-ACNC: NORMAL
CMV IGM SERPL IA-ACNC: <8 AU/ML
CMV IGM SERPL IA-ACNC: NEGATIVE

## 2021-08-18 ENCOUNTER — PRENATAL OFFICE VISIT (OUTPATIENT)
Dept: OBGYN | Facility: CLINIC | Age: 36
End: 2021-08-18
Payer: COMMERCIAL

## 2021-08-18 VITALS
WEIGHT: 182 LBS | BODY MASS INDEX: 32.25 KG/M2 | SYSTOLIC BLOOD PRESSURE: 110 MMHG | HEIGHT: 63 IN | DIASTOLIC BLOOD PRESSURE: 70 MMHG

## 2021-08-18 DIAGNOSIS — O30.042 DICHORIONIC DIAMNIOTIC TWIN PREGNANCY IN SECOND TRIMESTER: ICD-10-CM

## 2021-08-18 DIAGNOSIS — O09.522 MULTIGRAVIDA OF ADVANCED MATERNAL AGE IN SECOND TRIMESTER: Primary | ICD-10-CM

## 2021-08-18 PROCEDURE — 99207 PR PRENATAL VISIT: CPT | Performed by: OBSTETRICS & GYNECOLOGY

## 2021-08-18 ASSESSMENT — MIFFLIN-ST. JEOR: SCORE: 1479.68

## 2021-08-18 NOTE — PROGRESS NOTES
37 yo  with AMA and prior LTCS with Di/Di M/F twins.  U/S in MFM on  discussed.  FGR in twin a wih a possible membranous VSD and club foot.  Weekly follow up scans scheduled.  Anxious as would be expected.  RTC 4 weeks with GCT/Hgb/Trep and TdaP.

## 2021-08-18 NOTE — NURSING NOTE
"Chief Complaint   Patient presents with     Prenatal Care     23 6/7 weeks       Initial /70 (BP Location: Left arm, Patient Position: Chair, Cuff Size: Adult Large)   Ht 1.6 m (5' 3\")   Wt 82.6 kg (182 lb)   LMP 2021 (Exact Date)   Breastfeeding No   BMI 32.24 kg/m   Estimated body mass index is 32.24 kg/m  as calculated from the following:    Height as of this encounter: 1.6 m (5' 3\").    Weight as of this encounter: 82.6 kg (182 lb).  BP completed using cuff size: regular    Questioned patient about current smoking habits.  Pt. has never smoked.          The following HM Due: NONE    +fetal movement  -swelling    Kaya Faust, CMA    "

## 2021-08-25 ENCOUNTER — OFFICE VISIT (OUTPATIENT)
Dept: MATERNAL FETAL MEDICINE | Facility: CLINIC | Age: 36
End: 2021-08-25
Attending: OBSTETRICS & GYNECOLOGY
Payer: COMMERCIAL

## 2021-08-25 ENCOUNTER — HOSPITAL ENCOUNTER (OUTPATIENT)
Dept: ULTRASOUND IMAGING | Facility: CLINIC | Age: 36
End: 2021-08-25
Attending: OBSTETRICS & GYNECOLOGY
Payer: COMMERCIAL

## 2021-08-25 DIAGNOSIS — O30.042 DICHORIONIC DIAMNIOTIC TWIN PREGNANCY IN SECOND TRIMESTER: ICD-10-CM

## 2021-08-25 PROCEDURE — 59025 FETAL NON-STRESS TEST: CPT | Mod: 26 | Performed by: OBSTETRICS & GYNECOLOGY

## 2021-08-25 PROCEDURE — 59025 FETAL NON-STRESS TEST: CPT

## 2021-08-25 PROCEDURE — 76820 UMBILICAL ARTERY ECHO: CPT | Mod: 59

## 2021-08-25 PROCEDURE — 59025 FETAL NON-STRESS TEST: CPT | Mod: 59 | Performed by: OBSTETRICS & GYNECOLOGY

## 2021-08-25 PROCEDURE — 76815 OB US LIMITED FETUS(S): CPT | Mod: 26 | Performed by: OBSTETRICS & GYNECOLOGY

## 2021-08-25 PROCEDURE — 76820 UMBILICAL ARTERY ECHO: CPT | Mod: 26 | Performed by: OBSTETRICS & GYNECOLOGY

## 2021-08-25 PROCEDURE — 76815 OB US LIMITED FETUS(S): CPT

## 2021-08-25 NOTE — PROGRESS NOTES
"Please see \"Imaging\" tab under Chart Review for full details.    Sandra Westfall MD  Maternal Fetal Medicine    "

## 2021-09-01 ENCOUNTER — OFFICE VISIT (OUTPATIENT)
Dept: MATERNAL FETAL MEDICINE | Facility: CLINIC | Age: 36
End: 2021-09-01
Attending: OBSTETRICS & GYNECOLOGY
Payer: COMMERCIAL

## 2021-09-01 ENCOUNTER — HOSPITAL ENCOUNTER (OUTPATIENT)
Dept: ULTRASOUND IMAGING | Facility: CLINIC | Age: 36
End: 2021-09-01
Attending: OBSTETRICS & GYNECOLOGY
Payer: COMMERCIAL

## 2021-09-01 DIAGNOSIS — O30.042 DICHORIONIC DIAMNIOTIC TWIN PREGNANCY IN SECOND TRIMESTER: ICD-10-CM

## 2021-09-01 PROCEDURE — 59025 FETAL NON-STRESS TEST: CPT | Mod: 26 | Performed by: OBSTETRICS & GYNECOLOGY

## 2021-09-01 PROCEDURE — 76815 OB US LIMITED FETUS(S): CPT | Mod: 26 | Performed by: OBSTETRICS & GYNECOLOGY

## 2021-09-01 PROCEDURE — 76820 UMBILICAL ARTERY ECHO: CPT | Mod: 59

## 2021-09-01 PROCEDURE — 76815 OB US LIMITED FETUS(S): CPT

## 2021-09-01 PROCEDURE — 59025 FETAL NON-STRESS TEST: CPT | Mod: 59

## 2021-09-01 PROCEDURE — 76820 UMBILICAL ARTERY ECHO: CPT | Mod: 26 | Performed by: OBSTETRICS & GYNECOLOGY

## 2021-09-01 PROCEDURE — 59025 FETAL NON-STRESS TEST: CPT

## 2021-09-08 ENCOUNTER — HOSPITAL ENCOUNTER (OUTPATIENT)
Dept: ULTRASOUND IMAGING | Facility: CLINIC | Age: 36
End: 2021-09-08
Attending: OBSTETRICS & GYNECOLOGY
Payer: COMMERCIAL

## 2021-09-08 ENCOUNTER — OFFICE VISIT (OUTPATIENT)
Dept: MATERNAL FETAL MEDICINE | Facility: CLINIC | Age: 36
End: 2021-09-08
Attending: OBSTETRICS & GYNECOLOGY
Payer: COMMERCIAL

## 2021-09-08 DIAGNOSIS — O30.042 DICHORIONIC DIAMNIOTIC TWIN PREGNANCY IN SECOND TRIMESTER: ICD-10-CM

## 2021-09-08 DIAGNOSIS — O30.042 DICHORIONIC DIAMNIOTIC TWIN PREGNANCY IN SECOND TRIMESTER: Primary | ICD-10-CM

## 2021-09-08 PROCEDURE — 59025 FETAL NON-STRESS TEST: CPT | Mod: 59

## 2021-09-08 PROCEDURE — 76816 OB US FOLLOW-UP PER FETUS: CPT | Mod: 26 | Performed by: OBSTETRICS & GYNECOLOGY

## 2021-09-08 PROCEDURE — 76816 OB US FOLLOW-UP PER FETUS: CPT | Mod: 59

## 2021-09-08 PROCEDURE — 59025 FETAL NON-STRESS TEST: CPT

## 2021-09-08 NOTE — PROGRESS NOTES
"Please see \"Imaging\" tab under \"Chart Review\" for details of today's US at the [facility].    Angelito Jackson MD  Maternal-Fetal Medicine    "

## 2021-09-14 ENCOUNTER — HOSPITAL ENCOUNTER (OUTPATIENT)
Dept: CARDIOLOGY | Facility: CLINIC | Age: 36
End: 2021-09-14
Attending: OBSTETRICS & GYNECOLOGY
Payer: COMMERCIAL

## 2021-09-14 ENCOUNTER — OFFICE VISIT (OUTPATIENT)
Dept: CARDIOLOGY | Facility: CLINIC | Age: 36
End: 2021-09-14

## 2021-09-14 DIAGNOSIS — O30.042 DICHORIONIC DIAMNIOTIC TWIN PREGNANCY IN SECOND TRIMESTER: ICD-10-CM

## 2021-09-14 DIAGNOSIS — O35.BXX0 FETAL CARDIAC DISEASE AFFECTING PREGNANCY, SINGLE OR UNSPECIFIED FETUS: Primary | ICD-10-CM

## 2021-09-14 PROCEDURE — 93325 DOPPLER ECHO COLOR FLOW MAPG: CPT | Mod: 26 | Performed by: PEDIATRICS

## 2021-09-14 PROCEDURE — 76825 ECHO EXAM OF FETAL HEART: CPT | Mod: 26 | Performed by: PEDIATRICS

## 2021-09-14 PROCEDURE — 76827 ECHO EXAM OF FETAL HEART: CPT | Mod: 26 | Performed by: PEDIATRICS

## 2021-09-14 PROCEDURE — 93325 DOPPLER ECHO COLOR FLOW MAPG: CPT

## 2021-09-14 PROCEDURE — 99202 OFFICE O/P NEW SF 15 MIN: CPT | Mod: 25 | Performed by: PEDIATRICS

## 2021-09-14 PROCEDURE — 76827 ECHO EXAM OF FETAL HEART: CPT

## 2021-09-14 NOTE — PROGRESS NOTES
[facility]   [facility] Center Fetal Consult Note    Patient:  Vianca Valle MRN:  [id number]   YOB: 1985 Age:  36 year old   Date of Visit:  2021 PCP:  No Ref-Primary, Physician     Dear Dr. Oedll,     I had the pleasure of seeing Vianca Valle at the [facility] on 2021 in fetal cardiology consultation for fetal echocardiogram results. She presented today accompanied by her partner. As you know, she is a 36 year old  at 27w5d who presented for fetal echocardiogram today because of diamniotic, dichorionic twin gestation and suspected ventricular septal defect in twin 1.    I performed and interpreted the fetal echocardiogram today, which demonstrated:   Fetus 1. Normal fetal cardiac anatomy. Normal fetal intracardiac connections. Normal right and left ventricular size and function. No obvious ventricular level shunting. Fetal heart rate is regular at 143 bpm. No hydrops.Difficult study due to fetal position.    Fetus 2. Normal fetal cardiac anatomy. Normal fetal intracardiac connections. Normal right and left ventricular size and function. Fetal heart rate is regular at 141 bpm. No hydrops.      I reviewed the echo findings today with Vianca Valle and her partner. Although this was a technically difficult study, the patient is aware that no obvious cardiac abnormalities were identified. She is aware of the general limitations of fetal echocardiography. No additional fetal echocardiograms are recommended. No  cardiac follow-up is required.     Thank you for allowing me to participate in Vianca's care. Please do not hesitate to contact me with questions or concerns.    This visit was separate from the performance and interpretation of the ultrasound. The majority of the time (>50%) was spent in counseling and coordination of care. I spent approximately 20 minutes in face-to-face time reviewing the above  considerations.    Kari Chin M.D.  Pediatric Cardiology  [facility]  [address], 5th floor, [place] [address]  Phone [phone]  Fax [phone]

## 2021-09-15 ENCOUNTER — PRENATAL OFFICE VISIT (OUTPATIENT)
Dept: OBGYN | Facility: CLINIC | Age: 36
End: 2021-09-15
Payer: COMMERCIAL

## 2021-09-15 VITALS — DIASTOLIC BLOOD PRESSURE: 64 MMHG | BODY MASS INDEX: 32.77 KG/M2 | SYSTOLIC BLOOD PRESSURE: 108 MMHG | WEIGHT: 185 LBS

## 2021-09-15 DIAGNOSIS — O09.522 MULTIGRAVIDA OF ADVANCED MATERNAL AGE IN SECOND TRIMESTER: Primary | ICD-10-CM

## 2021-09-15 DIAGNOSIS — O30.042 DICHORIONIC DIAMNIOTIC TWIN PREGNANCY IN SECOND TRIMESTER: ICD-10-CM

## 2021-09-15 LAB
ERYTHROCYTE [DISTWIDTH] IN BLOOD BY AUTOMATED COUNT: 13.7 % (ref 10–15)
HCT VFR BLD AUTO: 35.1 % (ref 35–47)
HGB BLD-MCNC: 11.5 G/DL (ref 11.7–15.7)
MCH RBC QN AUTO: 30 PG (ref 26.5–33)
MCHC RBC AUTO-ENTMCNC: 32.8 G/DL (ref 31.5–36.5)
MCV RBC AUTO: 92 FL (ref 78–100)
PLATELET # BLD AUTO: 203 10E3/UL (ref 150–450)
RBC # BLD AUTO: 3.83 10E6/UL (ref 3.8–5.2)
WBC # BLD AUTO: 13.8 10E3/UL (ref 4–11)

## 2021-09-15 PROCEDURE — 82950 GLUCOSE TEST: CPT | Performed by: OBSTETRICS & GYNECOLOGY

## 2021-09-15 PROCEDURE — 90471 IMMUNIZATION ADMIN: CPT | Performed by: OBSTETRICS & GYNECOLOGY

## 2021-09-15 PROCEDURE — 85027 COMPLETE CBC AUTOMATED: CPT | Performed by: OBSTETRICS & GYNECOLOGY

## 2021-09-15 PROCEDURE — 90715 TDAP VACCINE 7 YRS/> IM: CPT | Performed by: OBSTETRICS & GYNECOLOGY

## 2021-09-15 PROCEDURE — 99207 PR PRENATAL VISIT: CPT | Performed by: OBSTETRICS & GYNECOLOGY

## 2021-09-15 PROCEDURE — 36415 COLL VENOUS BLD VENIPUNCTURE: CPT | Performed by: OBSTETRICS & GYNECOLOGY

## 2021-09-15 PROCEDURE — 86780 TREPONEMA PALLIDUM: CPT | Performed by: OBSTETRICS & GYNECOLOGY

## 2021-09-15 NOTE — PROGRESS NOTES
37yo  at 27w6d with Di/Di M/F twins.  Normal Echos for both yesterday.  GCT/Hgb/Trep and TdaP today.  RTC 2 weeks.  [facility] follow up

## 2021-09-16 DIAGNOSIS — R73.09 ABNORMAL GLUCOSE TOLERANCE TEST: Primary | ICD-10-CM

## 2021-09-16 LAB
GLUCOSE 1H P 50 G GLC PO SERPL-MCNC: 154 MG/DL (ref 70–129)
T PALLIDUM AB SER QL: NONREACTIVE

## 2021-09-23 ENCOUNTER — LAB (OUTPATIENT)
Dept: LAB | Facility: CLINIC | Age: 36
End: 2021-09-23
Payer: COMMERCIAL

## 2021-09-23 DIAGNOSIS — R73.09 ABNORMAL GLUCOSE TOLERANCE TEST: ICD-10-CM

## 2021-09-23 PROCEDURE — 82952 GTT-ADDED SAMPLES: CPT

## 2021-09-23 PROCEDURE — 36415 COLL VENOUS BLD VENIPUNCTURE: CPT

## 2021-09-23 PROCEDURE — 82951 GLUCOSE TOLERANCE TEST (GTT): CPT

## 2021-09-24 LAB
GESTATIONAL GTT 1 HR POST DOSE: 180 MG/DL (ref 60–179)
GESTATIONAL GTT 2 HR POST DOSE: 186 MG/DL (ref 60–154)
GESTATIONAL GTT 3 HR POST DOSE: 138 MG/DL (ref 60–139)
GLUCOSE P FAST SERPL-MCNC: 77 MG/DL (ref 60–94)

## 2021-09-27 ENCOUNTER — TELEPHONE (OUTPATIENT)
Dept: OBGYN | Facility: CLINIC | Age: 36
End: 2021-09-27

## 2021-09-27 DIAGNOSIS — O24.410 DIET CONTROLLED GESTATIONAL DIABETES MELLITUS (GDM) IN THIRD TRIMESTER: Primary | ICD-10-CM

## 2021-09-27 NOTE — TELEPHONE ENCOUNTER
Diabetes Education Scheduling Outreach #1:    Call to patient to schedule. Left message with phone number to call to schedule.    Plan for 2nd outreach attempt within 2 days    Josie Lopez  [facility]  Diabetes and Nutrition Scheduling

## 2021-09-28 NOTE — TELEPHONE ENCOUNTER
Patient calling and no openings within a week.  What should she do?  She wants a morning appointment also

## 2021-09-28 NOTE — TELEPHONE ENCOUNTER
Spoke with patient who is available for 10/6 video visit. Scheduled today - this is just over a week from referral date. Patient is familiar with [facility] visits, will log on via [facility].     Roseann Gonzalez RD, LD, [facility]

## 2021-09-29 ENCOUNTER — OFFICE VISIT (OUTPATIENT)
Dept: MATERNAL FETAL MEDICINE | Facility: CLINIC | Age: 36
End: 2021-09-29
Attending: OBSTETRICS & GYNECOLOGY
Payer: COMMERCIAL

## 2021-09-29 ENCOUNTER — HOSPITAL ENCOUNTER (OUTPATIENT)
Dept: ULTRASOUND IMAGING | Facility: CLINIC | Age: 36
End: 2021-09-29
Attending: OBSTETRICS & GYNECOLOGY
Payer: COMMERCIAL

## 2021-09-29 ENCOUNTER — PRENATAL OFFICE VISIT (OUTPATIENT)
Dept: OBGYN | Facility: CLINIC | Age: 36
End: 2021-09-29
Payer: COMMERCIAL

## 2021-09-29 VITALS — DIASTOLIC BLOOD PRESSURE: 72 MMHG | WEIGHT: 185.4 LBS | SYSTOLIC BLOOD PRESSURE: 110 MMHG | BODY MASS INDEX: 32.84 KG/M2

## 2021-09-29 DIAGNOSIS — O30.043 DICHORIONIC DIAMNIOTIC TWIN PREGNANCY IN THIRD TRIMESTER: Primary | ICD-10-CM

## 2021-09-29 DIAGNOSIS — O30.049 TWIN DICHORIONIC DIAMNIOTIC PLACENTA: Primary | ICD-10-CM

## 2021-09-29 DIAGNOSIS — O36.5990 PREGNANCY AFFECTED BY FETAL GROWTH RESTRICTION: ICD-10-CM

## 2021-09-29 DIAGNOSIS — O30.042 DICHORIONIC DIAMNIOTIC TWIN PREGNANCY IN SECOND TRIMESTER: ICD-10-CM

## 2021-09-29 DIAGNOSIS — O24.410 DIET CONTROLLED GESTATIONAL DIABETES MELLITUS (GDM) IN THIRD TRIMESTER: ICD-10-CM

## 2021-09-29 PROCEDURE — 76816 OB US FOLLOW-UP PER FETUS: CPT | Mod: 59

## 2021-09-29 PROCEDURE — 99207 PR PRENATAL VISIT: CPT | Performed by: OBSTETRICS & GYNECOLOGY

## 2021-09-29 PROCEDURE — 76820 UMBILICAL ARTERY ECHO: CPT | Mod: 59

## 2021-09-29 PROCEDURE — 59025 FETAL NON-STRESS TEST: CPT

## 2021-09-29 PROCEDURE — 59025 FETAL NON-STRESS TEST: CPT | Mod: 26 | Performed by: OBSTETRICS & GYNECOLOGY

## 2021-09-29 PROCEDURE — 76820 UMBILICAL ARTERY ECHO: CPT | Mod: 26 | Performed by: OBSTETRICS & GYNECOLOGY

## 2021-09-29 PROCEDURE — 76816 OB US FOLLOW-UP PER FETUS: CPT | Mod: 26 | Performed by: OBSTETRICS & GYNECOLOGY

## 2021-09-29 PROCEDURE — 59025 FETAL NON-STRESS TEST: CPT | Mod: 59

## 2021-09-29 NOTE — NURSING NOTE
"Chief Complaint   Patient presents with     Prenatal Care   29w6d    initial /72   Wt 84.1 kg (185 lb 6.4 oz)   LMP 03/04/2021 (Exact Date)   BMI 32.84 kg/m   Estimated body mass index is 32.84 kg/m  as calculated from the following:    Height as of 8/18/21: 1.6 m (5' 3\").    Weight as of this encounter: 84.1 kg (185 lb 6.4 oz).  BP completed using cuff size regular.  Renee Reyes CMA    "

## 2021-09-29 NOTE — PROGRESS NOTES
37 yo  at 29w6d with Di/Di M/F twins.  Just seen in [facility] where see is being followed for IUGR with 2x/weekly NSTs.  Recently diagnosed with GDM - DE appt next week.  RTC 2 weeks

## 2021-10-01 ENCOUNTER — OFFICE VISIT (OUTPATIENT)
Dept: MATERNAL FETAL MEDICINE | Facility: CLINIC | Age: 36
End: 2021-10-01
Attending: OBSTETRICS & GYNECOLOGY
Payer: COMMERCIAL

## 2021-10-01 ENCOUNTER — HOSPITAL ENCOUNTER (OUTPATIENT)
Dept: ULTRASOUND IMAGING | Facility: CLINIC | Age: 36
End: 2021-10-01
Attending: OBSTETRICS & GYNECOLOGY
Payer: COMMERCIAL

## 2021-10-01 DIAGNOSIS — O30.049 TWIN DICHORIONIC DIAMNIOTIC PLACENTA: Primary | ICD-10-CM

## 2021-10-01 DIAGNOSIS — O36.5990 PREGNANCY AFFECTED BY FETAL GROWTH RESTRICTION: ICD-10-CM

## 2021-10-01 PROCEDURE — 59025 FETAL NON-STRESS TEST: CPT | Mod: 59

## 2021-10-01 PROCEDURE — 59025 FETAL NON-STRESS TEST: CPT

## 2021-10-01 PROCEDURE — 76815 OB US LIMITED FETUS(S): CPT

## 2021-10-01 PROCEDURE — 59025 FETAL NON-STRESS TEST: CPT | Mod: 26 | Performed by: OBSTETRICS & GYNECOLOGY

## 2021-10-01 PROCEDURE — 76820 UMBILICAL ARTERY ECHO: CPT | Mod: 59

## 2021-10-01 PROCEDURE — 76820 UMBILICAL ARTERY ECHO: CPT | Mod: 26 | Performed by: OBSTETRICS & GYNECOLOGY

## 2021-10-01 PROCEDURE — 76815 OB US LIMITED FETUS(S): CPT | Mod: 26 | Performed by: OBSTETRICS & GYNECOLOGY

## 2021-10-03 ENCOUNTER — HEALTH MAINTENANCE LETTER (OUTPATIENT)
Age: 36
End: 2021-10-03

## 2021-10-03 ENCOUNTER — E-VISIT (OUTPATIENT)
Dept: URGENT CARE | Facility: URGENT CARE | Age: 36
End: 2021-10-03
Payer: COMMERCIAL

## 2021-10-03 DIAGNOSIS — Z20.822 SUSPECTED COVID-19 VIRUS INFECTION: Primary | ICD-10-CM

## 2021-10-03 PROCEDURE — 99421 OL DIG E/M SVC 5-10 MIN: CPT | Performed by: PHYSICIAN ASSISTANT

## 2021-10-04 ENCOUNTER — LAB (OUTPATIENT)
Dept: URGENT CARE | Facility: URGENT CARE | Age: 36
End: 2021-10-04
Attending: PHYSICIAN ASSISTANT
Payer: COMMERCIAL

## 2021-10-04 ENCOUNTER — TELEPHONE (OUTPATIENT)
Dept: MATERNAL FETAL MEDICINE | Facility: CLINIC | Age: 36
End: 2021-10-04

## 2021-10-04 DIAGNOSIS — Z20.822 SUSPECTED COVID-19 VIRUS INFECTION: ICD-10-CM

## 2021-10-04 LAB — SARS-COV-2 RNA RESP QL NAA+PROBE: NEGATIVE

## 2021-10-04 PROCEDURE — U0003 INFECTIOUS AGENT DETECTION BY NUCLEIC ACID (DNA OR RNA); SEVERE ACUTE RESPIRATORY SYNDROME CORONAVIRUS 2 (SARS-COV-2) (CORONAVIRUS DISEASE [COVID-19]), AMPLIFIED PROBE TECHNIQUE, MAKING USE OF HIGH THROUGHPUT TECHNOLOGIES AS DESCRIBED BY CMS-2020-01-R: HCPCS

## 2021-10-04 PROCEDURE — U0005 INFEC AGEN DETEC AMPLI PROBE: HCPCS

## 2021-10-04 NOTE — PATIENT INSTRUCTIONS
Dear Vianca Valle,    Your symptoms show that you may have coronavirus (COVID-19). This illness can cause fever, cough and trouble breathing. Many people get a mild case and get better on their own. Some people can get very sick.    Will I be tested for COVID-19?  We would like to test you for Covid-19 virus. I have placed orders for this test.     For all employees or close contacts (except [place] and [employer] - see below), go to your [employer] home page and scroll down to the section that says  You have an appointment that needs to be scheduled  and click the large green button that says  Schedule Now  and follow the steps to find the next available opening.     If you are unable to complete these steps or if you cannot find any available times, please call [phone] to schedule employee testing.     [facility] employees or close contacts, please call [phone].   [place] ([employer]) employees or close contacts call [phone].    Return to work/school/ guidance:  Please let your workplace manager and staffing office know when your quarantine ends     We can t give you an exact date as it depends on the above. You can calculate this on your own or work with your manager/staffing office to calculate this. (For example if you were exposed on 10/4, you would have to quarantine for 14 full days. That would be through 10/18. You could return on 10/19.)      If you receive a positive COVID-19 test result, follow the guidance of the those who are giving you the results. Usually the return to work is 10 (or in some cases 20 days from symptom onset.) If you work at [employer], you must also be cleared by Employee Occupational Health and Safety to return to work.        If you receive a negative COVID-19 test result and did not have a high risk exposure to someone with a known positive COVID-19 test, you can return to work once you're free of fever for 24 hours without fever-reducing medication  and your symptoms are improving or resolved.      If you receive a negative COVID-19 test and If you had a high risk exposure to someone who has tested positive for COVID-19 then you can return to work 14 days after your last contact with the positive individual    Note: If you have ongoing exposure to the covid positive person, this quarantine period may be more than 14 days. (For example, if you are continued to be exposed to your child who tested positive and cannot isolate from them, then the quarantine of 7-14 days can't start until your child is no longer contagious. This is typically 10 days from onset of the child's symptoms. So the total duration may be 17-24 days in this case.)    Sign up for [employer]Port[employer].   We know it's scary to hear that you might have COVID-19. We want to track your symptoms to make sure you're okay over the next 2 weeks. Please look for an email from mPortal--this is a free, online program that we'll use to keep in touch. To sign up, follow the link in the email you will receive. Learn more at [email or web address]    How can I take care of myself?    Get lots of rest. Drink extra fluids (unless a doctor has told you not to)    Take Tylenol (acetaminophen) or ibuprofen for fever or pain. If you have liver or kidney problems, ask your family doctor if it's okay to take Tylenol o ibuprofen    If you have other health problems (like cancer, heart failure, an organ transplant or severe kidney disease): Call your specialty clinic if you don't feel better in the next 2 days.    Know when to call 911. Emergency warning signs include:  o Trouble breathing or shortness of breath  o Pain or pressure in the chest that doesn't go away  o Feeling confused like you haven't felt before, or not being able to wake up  o Bluish-colored lips or face    Where can I get more information?   [employer] [place] - About COVID-19:   [email or web address]    CDC - What to Do If You're  Sick:   www.cdc.gov/coronavirus/2019-ncov/about/steps-when-sick.html    October 3, 2021  RE:  Vianca Valle                                                                                                                  [address] [place] [address]      To whom it may concern:    I evaluated Vianca Valle on October 3, 2021. Vianca Valle should be excused from work/school.     They should let their workplace manager and staffing office know when their quarantine ends.    We can not give an exact date as it depends on the information below. They can calculate this on their own or work with their manager/staffing office to calculate this. (For example if they were exposed on 10/04, they would have to quarantine for 14 full days. That would be through 10/18. They could return on 10/19.)    Quarantine Guidelines:      If patient receives a positive COVID-19 test result, they should follow the guidance of those who are giving the results. Usually the return to work is 10 (or in some cases 20 days from symptom onset.) If they work at [employer], they must be cleared by Employee Occupational Health and Safety to return to work.        If patient receives a negative COVID-19 test result and did not have a high risk exposure to someone with a known positive COVID-19 test, they can return to work once they're free of fever for 24 hours without fever-reducing medication and their symptoms are improving or resolved.      If patient receives a negative COVID-19 test and if they had a high risk exposure to someone who has tested positive for COVID-19 then they can return to work 14 days after their last contact with the positive individual    Note: If there is ongoing exposure to the covid positive person, this quarantine period may be longer than 14 days. (For example, if they are continually exposed to their child, who tested positive and cannot isolate from them, then the quarantine of 7-14 days  can't start until their child is no longer contagious. This is typically 10 days from onset to the child's symptoms. So the total duration may be 17-24 days in this case.)     Sincerely,  Bhavin Izquierdo PA-C

## 2021-10-04 NOTE — TELEPHONE ENCOUNTER
Pt calling to report getting COVID tested this AM d/t congestion, runny nose, fatigue. Sx onset yesterday, got 3rd COVID vaccine dose 10/2. Discussed with Dr. Westfall, directed pt to present for appt tomorrow regardless of COVID results. Pt VU.

## 2021-10-05 ENCOUNTER — OFFICE VISIT (OUTPATIENT)
Dept: MATERNAL FETAL MEDICINE | Facility: CLINIC | Age: 36
End: 2021-10-05
Attending: OBSTETRICS & GYNECOLOGY
Payer: COMMERCIAL

## 2021-10-05 ENCOUNTER — TELEPHONE (OUTPATIENT)
Dept: OBGYN | Facility: CLINIC | Age: 36
End: 2021-10-05

## 2021-10-05 ENCOUNTER — HOSPITAL ENCOUNTER (OUTPATIENT)
Dept: ULTRASOUND IMAGING | Facility: CLINIC | Age: 36
End: 2021-10-05
Attending: OBSTETRICS & GYNECOLOGY
Payer: COMMERCIAL

## 2021-10-05 DIAGNOSIS — O36.5990 PREGNANCY AFFECTED BY FETAL GROWTH RESTRICTION: ICD-10-CM

## 2021-10-05 PROCEDURE — 59025 FETAL NON-STRESS TEST: CPT | Mod: 26 | Performed by: OBSTETRICS & GYNECOLOGY

## 2021-10-05 PROCEDURE — 59025 FETAL NON-STRESS TEST: CPT

## 2021-10-05 PROCEDURE — 76820 UMBILICAL ARTERY ECHO: CPT | Mod: 26 | Performed by: OBSTETRICS & GYNECOLOGY

## 2021-10-05 PROCEDURE — 76815 OB US LIMITED FETUS(S): CPT | Mod: 26 | Performed by: OBSTETRICS & GYNECOLOGY

## 2021-10-05 PROCEDURE — 76815 OB US LIMITED FETUS(S): CPT

## 2021-10-05 PROCEDURE — 76820 UMBILICAL ARTERY ECHO: CPT | Mod: 59

## 2021-10-05 PROCEDURE — 59025 FETAL NON-STRESS TEST: CPT | Mod: 59

## 2021-10-05 NOTE — TELEPHONE ENCOUNTER
Form received from: [employer]    Form requesting following info/need: STD    KEILY needed?: No    Location of form: [place]'s desk    When completed the route for return: Fax [phone]

## 2021-10-06 ENCOUNTER — VIRTUAL VISIT (OUTPATIENT)
Dept: EDUCATION SERVICES | Facility: CLINIC | Age: 36
End: 2021-10-06
Payer: COMMERCIAL

## 2021-10-06 DIAGNOSIS — O24.410 DIET CONTROLLED GESTATIONAL DIABETES MELLITUS (GDM) IN THIRD TRIMESTER: Primary | ICD-10-CM

## 2021-10-06 PROCEDURE — G0108 DIAB MANAGE TRN  PER INDIV: HCPCS | Mod: 95

## 2021-10-06 NOTE — PATIENT INSTRUCTIONS
Your 1 week follow-up Diabetes Education visit is scheduled on 10/18 via video    1. Check blood sugar 4 times a day, before breakfast and 1 hour after the start of each meal.     2. Check urine ketones when you wake up every morning for 7 days. If negative everyday, reduce testing to once a week.    3. Follow the recommended meal plan: eat something every 2-3 hours, include protein/fat and carbohydrate at every meal and snack, have 30-45g carbs at breakfast, 45-60g carbs at lunch, 45-60g carbs at supper, 15-30g carbs at 3 snacks per day.     4. Add activity to every day, try walking or being active after each meal to help control blood sugar levels.    5.Call or send a [employer] message to your educator if 3 or more blood sugars are above goal in 1 week, you have an elevated ketone result (trace or higher), or with questions or concerns.      [facility] for the [facility]:  For Your Diabetes Education or Nutrition Appointments Call:  [phone]   For Diabetes Education and Nutrition Related Questions:   Phone: [phone]  Send [employer] Message   If you need a medication refill please contact your pharmacy. Please allow 3 business days for your refills to be completed.

## 2021-10-06 NOTE — PROGRESS NOTES
Diabetes Self-Management Education & Support    Type of service:  Video Visit    If the video visit is dropped, the video visit invitation should be resent by: Send to e-mail at: [email or web address]    Originating Location (pt. Location): Home  Distant Location (provider location): Home  Mode of Communication:  Video Conference via [employer]    Video Start Time: 10:34a  Video End Time (time video stopped): 11:40a    How would patient like to obtain AVS? MyChart      SUBJECTIVE/OBJECTIVE:  Presents for education related to gestational diabetes.    Accompanied by: Self, Spouse  Diabetes management related comments/concerns: Do I have to test my blood sugars at home? Biggest issue has been weight gain with this pregnancy - trying to add in more calories & could use some tips  Gestational weeks: 30w6d  Hospital planned for delivery: [facility]  Next OB Visit Date: 10/13/21  Number of previous preganancies: 1  Had any babies over 9 lbs: No  Previously had Gestational Diabetes: No  Have you ever had thyroid problems or taken thyroid medication?: No  Heart disease, mitral valve prolapse or rheumatic fever?: No  Hypertension : No  High Cholesterol: No  High Triglycerides: No  Do you use tobacco products?: No  Do you drink beer, wine or hard liquor?: No    Cultural Influences/Ethnic Background:  Choose not to answer    Estimated Date of Delivery: Dec 9, 2021    1 hour OGTT  Lab Results   Component Value Date    GLU1 154 (H) 09/15/2021       3 hour OGTT    Fasting  Lab Results   Component Value Date    GLF 83 10/19/2018       1 hour  Lab Results   Component Value Date    GL1 157 10/19/2018       2 hour  Lab Results   Component Value Date    GL2 160 (H) 10/19/2018       3 hour  Lab Results   Component Value Date    GL3 126 10/19/2018       Lifestyle and Health Behaviors:  Pre-pregnancy weight (lbs): 181  Exercise:: Yes  How intense was your typical exercise? : Light (like stretching or slow walking) (chasing a toddler,  occasional walks, busy at work)  Barrier to exercise: None  Cultural/Hoahaoism diet restrictions?: No  Meal planning/habits: None  Meals include: Breakfast, Lunch, Dinner, Morning Snack, Afternoon Snack  Breakfast: breakfast bar on the go, 10a - breakfast sandwich  Lunch: 1p - fruit OR yogurt  Dinner: 8-8:30p - protein, vegetable, starch (e.g. mashed potaotes + gravy)  Snacks: 4p - snacks (cheese & crackers OR nut mix OR protein bars)  Other: Eats late so no HS snack, trying to add in kcals through protein drinks, more nuts, added fats with foods  Beverages: Sports drinks, Other  How many servings of fruits/vegetables per day: 2  Biggest challenges to healthy eating: None  Pre-audra vitamin?: Yes  Supplements?: Yes  List supplements currently taking: DHA, probiotic  Experiencing nausea?: No  Experiencing heartburn?: Yes    Healthy Coping:  Emotional response to diabetes: Ready to learn, Concern for health and well-being  Informal Support system:: Spouse  Stage of change: ACTION (Actively working towards change)    Current Management:  Taking medications for gestational diabetes?: No  Difficulty affording diabetes medication?: No  Difficulty affording diabetes testing supplies?: No    ASSESSMENT:  Patient &  have been primarily focused on weight gain due to small for GA babies & patient having only gained ~3 lbs this pregnancy. Now wondering how to adjust for GDM as well. Patient was also hoping she did not have to check blood sugars at home - explained that this is the only way to monitor how her blood sugars are trending, if they're at a safe place. Patient & spouse engaged in education and asked good questions, feeling a little overwhelmed but encouraged them that it gets easier and it's only short-term.     INTERVENTION:  Patient encouraged to seek guidance from PharmD on glucometer use when she picks supplies up    Educational topics covered today:  GDM diagnosis, pathophysiology, Risks and Complications  of GDM, Means of controlling GDM, Using a Blood Glucose Monitor, Blood Glucose Goals, Logging and Interpreting Glucose Results, Ketone Testing, When to Call a Diabetes Educator or OB Provider, Healthy Eating During Pregnancy, Counting Carbohydrates, Meal Planning for GDM, and Physical Activity    Educational materials provided today:   [facility] Understanding Gestational Diabetes  GDM Log Book  Sharps Disposal  Care After Delivery    Pt verbalized understanding of concepts discussed and recommendations provided today.     PLAN:  Check glucose 4 times daily, before breakfast and 1 hour after each meal.     Check Ketones daily for one week, if negative, reduce testing to once a week.     Physical activity recommended: as able.    Meal plan: 30-45g carbs at breakfast, 45-60g carbs at lunch, 45-60g carbs at supper, 15-30g carbs at 3 snacks a day.  Follow consistent CHO meal plan, eat CHO and protein/fat at all meals/snacks.    Call/e-mail/[employer] message diabetes educator if 3 or more blood sugars are above the goal in 1 week, if ketones are positive, or with questions/concerns.    Roseann Gonzalez RD, LD, [facility]   Time Spent: 60 minutes  Encounter Type: Individual    Any diabetes medication dose changes were made via the CDE Protocol and Collaborative Practice Agreement with the patient's OB/GYN provider. A copy of this encounter was shared with the provider.

## 2021-10-08 ENCOUNTER — HOSPITAL ENCOUNTER (OUTPATIENT)
Dept: ULTRASOUND IMAGING | Facility: CLINIC | Age: 36
End: 2021-10-08
Attending: OBSTETRICS & GYNECOLOGY
Payer: COMMERCIAL

## 2021-10-08 ENCOUNTER — OFFICE VISIT (OUTPATIENT)
Dept: MATERNAL FETAL MEDICINE | Facility: CLINIC | Age: 36
End: 2021-10-08
Attending: OBSTETRICS & GYNECOLOGY
Payer: COMMERCIAL

## 2021-10-08 DIAGNOSIS — O30.049 TWIN DICHORIONIC DIAMNIOTIC PLACENTA: ICD-10-CM

## 2021-10-08 DIAGNOSIS — O36.5990 PREGNANCY AFFECTED BY FETAL GROWTH RESTRICTION: ICD-10-CM

## 2021-10-08 DIAGNOSIS — O36.5990 PREGNANCY AFFECTED BY FETAL GROWTH RESTRICTION: Primary | ICD-10-CM

## 2021-10-08 PROCEDURE — 59025 FETAL NON-STRESS TEST: CPT | Mod: 59

## 2021-10-08 PROCEDURE — 59025 FETAL NON-STRESS TEST: CPT | Mod: 26 | Performed by: OBSTETRICS & GYNECOLOGY

## 2021-10-08 PROCEDURE — 76815 OB US LIMITED FETUS(S): CPT

## 2021-10-08 PROCEDURE — 76820 UMBILICAL ARTERY ECHO: CPT | Mod: 26 | Performed by: OBSTETRICS & GYNECOLOGY

## 2021-10-08 PROCEDURE — 59025 FETAL NON-STRESS TEST: CPT

## 2021-10-08 PROCEDURE — 76820 UMBILICAL ARTERY ECHO: CPT | Mod: 59

## 2021-10-08 PROCEDURE — 76815 OB US LIMITED FETUS(S): CPT | Mod: 26 | Performed by: OBSTETRICS & GYNECOLOGY

## 2021-10-08 NOTE — PROGRESS NOTES
Vianca Valle was seen for an ultrasound today at the [facility].      For the details of the ultrasound please see the report which can be found under the imaging tab.      Che Glez MD  , OB/GYN  Maternal-Fetal Medicine  [email or web address]  [phone] (Main MFM Office)  [phone] or [phone] (for 24 hour MFM questions)  [phone] (Pager)

## 2021-10-11 ENCOUNTER — TELEPHONE (OUTPATIENT)
Dept: OBGYN | Facility: CLINIC | Age: 36
End: 2021-10-11

## 2021-10-13 ENCOUNTER — HOSPITAL ENCOUNTER (OUTPATIENT)
Dept: ULTRASOUND IMAGING | Facility: CLINIC | Age: 36
End: 2021-10-13
Attending: OBSTETRICS & GYNECOLOGY
Payer: COMMERCIAL

## 2021-10-13 ENCOUNTER — PRENATAL OFFICE VISIT (OUTPATIENT)
Dept: OBGYN | Facility: CLINIC | Age: 36
End: 2021-10-13
Payer: COMMERCIAL

## 2021-10-13 ENCOUNTER — OFFICE VISIT (OUTPATIENT)
Dept: MATERNAL FETAL MEDICINE | Facility: CLINIC | Age: 36
End: 2021-10-13
Attending: OBSTETRICS & GYNECOLOGY
Payer: COMMERCIAL

## 2021-10-13 VITALS — SYSTOLIC BLOOD PRESSURE: 110 MMHG | WEIGHT: 190 LBS | DIASTOLIC BLOOD PRESSURE: 70 MMHG | BODY MASS INDEX: 33.66 KG/M2

## 2021-10-13 DIAGNOSIS — O36.5990 PREGNANCY AFFECTED BY FETAL GROWTH RESTRICTION: Primary | ICD-10-CM

## 2021-10-13 DIAGNOSIS — O30.043 DICHORIONIC DIAMNIOTIC TWIN PREGNANCY IN THIRD TRIMESTER: Primary | ICD-10-CM

## 2021-10-13 DIAGNOSIS — O36.5990 PREGNANCY AFFECTED BY FETAL GROWTH RESTRICTION: ICD-10-CM

## 2021-10-13 DIAGNOSIS — O24.410 DIET CONTROLLED GESTATIONAL DIABETES MELLITUS (GDM) IN THIRD TRIMESTER: ICD-10-CM

## 2021-10-13 DIAGNOSIS — O30.043 DICHORIONIC DIAMNIOTIC TWIN PREGNANCY IN THIRD TRIMESTER: ICD-10-CM

## 2021-10-13 PROCEDURE — 76815 OB US LIMITED FETUS(S): CPT | Mod: 26 | Performed by: OBSTETRICS & GYNECOLOGY

## 2021-10-13 PROCEDURE — 59025 FETAL NON-STRESS TEST: CPT | Mod: 26 | Performed by: OBSTETRICS & GYNECOLOGY

## 2021-10-13 PROCEDURE — 99207 PR PRENATAL VISIT: CPT | Performed by: OBSTETRICS & GYNECOLOGY

## 2021-10-13 PROCEDURE — 59025 FETAL NON-STRESS TEST: CPT

## 2021-10-13 PROCEDURE — 59025 FETAL NON-STRESS TEST: CPT | Mod: 59

## 2021-10-13 PROCEDURE — 76815 OB US LIMITED FETUS(S): CPT

## 2021-10-13 PROCEDURE — 76820 UMBILICAL ARTERY ECHO: CPT | Mod: 26 | Performed by: OBSTETRICS & GYNECOLOGY

## 2021-10-13 PROCEDURE — 76820 UMBILICAL ARTERY ECHO: CPT | Mod: 59

## 2021-10-13 NOTE — PROGRESS NOTES
Vianca Valle was seen for an ultrasound today at the [facility] center.      For the details of the ultrasound please see the report which can be found under the imaging tab.      Che Glez MD  , OB/GYN  Maternal-Fetal Medicine  [email or web address]  [phone] (Main MFM Office)  [phone] or [phone] (for 24 hour MFM questions)  [phone] (Pager)

## 2021-10-13 NOTE — PROGRESS NOTES
35 yo  at 31w6d with Di/Di twins.  GDMA1 with good control.  MFM following IUGR with 2x/weekly NSTs.  Heading to MFM immediately after today's visit.  Babies are active.  No ctx.    Will discuss delivery (repeat C/S) timing with MFM following growth U/S next week.  RTC 2 weeks

## 2021-10-13 NOTE — NURSING NOTE
"Chief Complaint   Patient presents with     Prenatal Care   31w6d    initial /70   Wt 86.2 kg (190 lb)   LMP 03/04/2021 (Exact Date)   BMI 33.66 kg/m   Estimated body mass index is 33.66 kg/m  as calculated from the following:    Height as of 8/18/21: 1.6 m (5' 3\").    Weight as of this encounter: 86.2 kg (190 lb).  BP completed using cuff size regular.  Renee Reyes CMA    "

## 2021-10-15 ENCOUNTER — HOSPITAL ENCOUNTER (OUTPATIENT)
Dept: ULTRASOUND IMAGING | Facility: CLINIC | Age: 36
End: 2021-10-15
Attending: OBSTETRICS & GYNECOLOGY
Payer: COMMERCIAL

## 2021-10-15 ENCOUNTER — OFFICE VISIT (OUTPATIENT)
Dept: MATERNAL FETAL MEDICINE | Facility: CLINIC | Age: 36
End: 2021-10-15
Attending: OBSTETRICS & GYNECOLOGY
Payer: COMMERCIAL

## 2021-10-15 DIAGNOSIS — O36.5990 PREGNANCY AFFECTED BY FETAL GROWTH RESTRICTION: ICD-10-CM

## 2021-10-15 PROCEDURE — 59025 FETAL NON-STRESS TEST: CPT | Mod: 26 | Performed by: OBSTETRICS & GYNECOLOGY

## 2021-10-15 PROCEDURE — 76820 UMBILICAL ARTERY ECHO: CPT | Mod: 26 | Performed by: OBSTETRICS & GYNECOLOGY

## 2021-10-15 PROCEDURE — 59025 FETAL NON-STRESS TEST: CPT | Mod: 59

## 2021-10-15 PROCEDURE — 59025 FETAL NON-STRESS TEST: CPT

## 2021-10-15 PROCEDURE — 76815 OB US LIMITED FETUS(S): CPT

## 2021-10-15 PROCEDURE — 76815 OB US LIMITED FETUS(S): CPT | Mod: 26 | Performed by: OBSTETRICS & GYNECOLOGY

## 2021-10-15 PROCEDURE — 76820 UMBILICAL ARTERY ECHO: CPT | Mod: 59

## 2021-10-18 ENCOUNTER — VIRTUAL VISIT (OUTPATIENT)
Dept: EDUCATION SERVICES | Facility: CLINIC | Age: 36
End: 2021-10-18
Payer: COMMERCIAL

## 2021-10-18 DIAGNOSIS — O24.410 DIET CONTROLLED GESTATIONAL DIABETES MELLITUS (GDM) IN THIRD TRIMESTER: Primary | ICD-10-CM

## 2021-10-18 PROCEDURE — G0108 DIAB MANAGE TRN  PER INDIV: HCPCS | Mod: 95 | Performed by: DIETITIAN, REGISTERED

## 2021-10-18 NOTE — PROGRESS NOTES
Type of service:  Video Visit    If the video visit is dropped, the video visit invitation should be resent by: Send to e-mail at: [email or web address]    Originating Location (pt. Location): Home  Distant Location (provider location): Home  Mode of Communication:  Video Conference via [employer]    Video Start Time: 10:00  Video End Time (time video stopped): 10:30    How would patient like to obtain AVS? MyChart    Diabetes Self-Management Education & Support    SUBJECTIVE/OBJECTIVE:  Presents for education related to gestational diabetes.    Accompanied by: Self, Spouse  Gestational weeks: 32w4d  Next OB Visit Date: 10/13/21  Number of previous preganancies: 1  Had any babies over 9 lbs: No  Previously had Gestational Diabetes: No  Have you ever had thyroid problems or taken thyroid medication?: No  Heart disease, mitral valve prolapse or rheumatic fever?: No  Hypertension : No  High Cholesterol: No  High Triglycerides: No  Do you use tobacco products?: No  Do you drink beer, wine or hard liquor?: No    Cultural Influences/Ethnic Background:  Choose not to answer    LMP 03/04/2021 (Exact Date)       Estimated Date of Delivery: Dec 9, 2021    Blood Glucose/Ketone Log:    Date Ketones Fasting Post Breakfast Post Lunch Post Supper   10/12  84 113 127 100   10/13  79 100 177 ([employer]) 88   10/14  84 92 141 (lots of walking-zoo) 123*   10/15  78 105 99 105   10/16  79 115 112 124   10/17  66 98 99 107   10/18  72 94       smal -- moderate--- small trace again    Lifestyle and Health Behaviors:  Pre-pregnancy weight (lbs): 181  Exercise:: Yes  How intense was your typical exercise? : Light (like stretching or slow walking) (chasing a toddler, occasional walks, busy at work)  Barrier to exercise: None  Cultural/Hindu diet restrictions?: No  Meal planning/habits: Carb counting  Meals include: Breakfast, Lunch, Dinner, Morning Snack, Afternoon Snack, Evening Snack  Breakfast: breakfast bar on the go, 10a -  breakfast sandwich  Lunch: tuna nad crackers, yogurt  Dinner: taco salad OR mashed potatoes, fish OR mac and cheese OR gyro OR meatloaf, potatoes, zucchini  Snacks: 4p - snacks (cheese & crackers OR nut mix OR protein bars)  Other: strawberries with whipped cream OR 1/2 waffle --HS  Beverages: Other, Water  How many servings of fruits/vegetables per day: 2  Biggest challenges to healthy eating: None  Pre-audra vitamin?: Yes  Supplements?: Yes  List supplements currently taking: DHA, probiotic  Experiencing nausea?: No  Experiencing heartburn?: Yes    Healthy Coping:  Emotional response to diabetes: Ready to learn, Concern for health and well-being  Informal Support system:: Spouse  Stage of change: ACTION (Actively working towards change)    Current Management:  Taking medications for gestational diabetes?: No  Difficulty affording diabetes medication?: No  Difficulty affording diabetes testing supplies?: No    ASSESSMENT:  Ketones: variable.   Fasting blood glucoses: 100% in target.  After breakfast: 100% in target.  After lunch: 71% in target.  After dinner: 100% in target.    Doing well, although still working through variable ketones. Trying to add more for snacks -- gave some advice on adding calories during the day without adding too much food volume.     INTERVENTION:  Educational topics covered today:  What to expect after delivery, Future testing for Type 2 diabetes (2 hour OGTT at 6 week post-partum check-up and annual fasting blood glucose level), Risk of GDM and planning ahead for future pregnancies, Recommended lifestyle interventions for reducing the risk of Type 2 Diabetes, When to Call a Diabetes Educator or OB Provider    Educational Materials provided today:  [facility] Preventing Diabetes    PLAN:  Check glucose 4 times daily.  Check ketones once a week when readings are consistently negative.  Continue with recommended physical activity.  Continue to follow recommended meal plan: 2-3 carbs at  breakfast, 3-4 carbs at lunch, 3-4 carbs at supper, 1-2 carbs at snacks.  Follow consistent CHO meal plan, eat CHO and protein/fat at all meals/snacks.    Call/e-mail/MyChart message diabetes educator if 3 or more blood sugars are above the goal in 1 week or if ketones are positive.    BRIANA Varner [facility]  Time Spent: 30 minutes  Encounter Type: Individual    Any diabetes medication dose changes were made via the CDE Protocol and Collaborative Practice Agreement with the patient's OB/GYN provider. A copy of this encounter was shared with the provider.

## 2021-10-19 ENCOUNTER — OFFICE VISIT (OUTPATIENT)
Dept: MATERNAL FETAL MEDICINE | Facility: CLINIC | Age: 36
End: 2021-10-19
Attending: OBSTETRICS & GYNECOLOGY
Payer: COMMERCIAL

## 2021-10-19 ENCOUNTER — HOSPITAL ENCOUNTER (OUTPATIENT)
Dept: ULTRASOUND IMAGING | Facility: CLINIC | Age: 36
End: 2021-10-19
Attending: OBSTETRICS & GYNECOLOGY
Payer: COMMERCIAL

## 2021-10-19 DIAGNOSIS — O36.5990 PREGNANCY AFFECTED BY FETAL GROWTH RESTRICTION: ICD-10-CM

## 2021-10-19 PROCEDURE — 59025 FETAL NON-STRESS TEST: CPT | Mod: 26 | Performed by: OBSTETRICS & GYNECOLOGY

## 2021-10-19 PROCEDURE — 76816 OB US FOLLOW-UP PER FETUS: CPT | Mod: 26 | Performed by: OBSTETRICS & GYNECOLOGY

## 2021-10-19 PROCEDURE — 76820 UMBILICAL ARTERY ECHO: CPT | Mod: 59

## 2021-10-19 PROCEDURE — 76816 OB US FOLLOW-UP PER FETUS: CPT | Mod: 59

## 2021-10-19 PROCEDURE — 59025 FETAL NON-STRESS TEST: CPT | Mod: 59

## 2021-10-19 PROCEDURE — 76820 UMBILICAL ARTERY ECHO: CPT | Mod: 26 | Performed by: OBSTETRICS & GYNECOLOGY

## 2021-10-19 PROCEDURE — 59025 FETAL NON-STRESS TEST: CPT

## 2021-10-20 DIAGNOSIS — B37.31 YEAST INFECTION OF THE VAGINA: Primary | ICD-10-CM

## 2021-10-22 ENCOUNTER — OFFICE VISIT (OUTPATIENT)
Dept: MATERNAL FETAL MEDICINE | Facility: CLINIC | Age: 36
End: 2021-10-22
Attending: OBSTETRICS & GYNECOLOGY
Payer: COMMERCIAL

## 2021-10-22 ENCOUNTER — HOSPITAL ENCOUNTER (OUTPATIENT)
Dept: ULTRASOUND IMAGING | Facility: CLINIC | Age: 36
End: 2021-10-22
Attending: OBSTETRICS & GYNECOLOGY
Payer: COMMERCIAL

## 2021-10-22 DIAGNOSIS — O36.5931 POOR FETAL GROWTH AFFECTING MANAGEMENT OF MOTHER IN THIRD TRIMESTER, FETUS 1: ICD-10-CM

## 2021-10-22 DIAGNOSIS — O36.5990 PREGNANCY AFFECTED BY FETAL GROWTH RESTRICTION: ICD-10-CM

## 2021-10-22 DIAGNOSIS — O30.043 DICHORIONIC DIAMNIOTIC TWIN PREGNANCY IN THIRD TRIMESTER: Primary | ICD-10-CM

## 2021-10-22 DIAGNOSIS — O36.5932 POOR FETAL GROWTH AFFECTING MANAGEMENT OF MOTHER IN THIRD TRIMESTER, FETUS 2: ICD-10-CM

## 2021-10-22 PROCEDURE — 59025 FETAL NON-STRESS TEST: CPT | Mod: 59

## 2021-10-22 PROCEDURE — 76820 UMBILICAL ARTERY ECHO: CPT | Mod: 59

## 2021-10-22 PROCEDURE — 59025 FETAL NON-STRESS TEST: CPT

## 2021-10-22 PROCEDURE — 76820 UMBILICAL ARTERY ECHO: CPT | Mod: 26 | Performed by: OBSTETRICS & GYNECOLOGY

## 2021-10-22 PROCEDURE — 76815 OB US LIMITED FETUS(S): CPT

## 2021-10-22 PROCEDURE — 59025 FETAL NON-STRESS TEST: CPT | Mod: 26 | Performed by: OBSTETRICS & GYNECOLOGY

## 2021-10-22 PROCEDURE — 76815 OB US LIMITED FETUS(S): CPT | Mod: 26 | Performed by: OBSTETRICS & GYNECOLOGY

## 2021-10-25 ENCOUNTER — MYC MEDICAL ADVICE (OUTPATIENT)
Dept: EDUCATION SERVICES | Facility: CLINIC | Age: 36
End: 2021-10-25

## 2021-10-25 NOTE — TELEPHONE ENCOUNTER
Gestational Diabetes Follow-up    Subjective/Objective:    Vianca Valle sent in blood glucose log for review. Last date of communication was: 10/18/21.    Gestational diabetes is being managed with diet and activity    Taking diabetes medications: no    Estimated Date of Delivery: Dec 9, 2021    BG/Food Log:       Assessment:    Ketones: negative x 7 days.   Fasting blood glucoses: 100% in target.  After breakfast: 100% in target.  After lunch: 100% in target.  After dinner: 100% in target.    Plan/Response:  Reduce ketone checks to once a week.  No changes in the patient's current treatment plan.  Follow-up in 1 week.  MyChart reply sent.    Martha Neal RDN, LD, CDCES     Any diabetes medication dose changes were made via the CDE Protocol and Collaborative Practice Agreement with the patient's referring provider. A copy of this encounter was shared with the provider.

## 2021-10-25 NOTE — PROGRESS NOTES
[facility] Dermatology Note  Encounter Date: Oct 26, 2021  Office Visit     Dermatology Problem List:  1. Eczematous dermatitis   ____________________________________________    Assessment & Plan:    # Inflamed verrucous seborrheic keratosis, left knee. Less likely considered verruca vulgaris.   - Cryotherapy performed today (see procedure note(s) below).  - If not improved in the next 4 weeks, return PRN for re-evaluation    # Benign lesions: Multiple benign nevi, solar lentigos, cherry angiomas. Explained to patient benign nature of lesion. No treatment is necessary at this time unless the lesion changes or becomes symptomatic.     - ABCDs of melanoma were discussed and self skin checks were advised.  - Sun precaution was advised including the use of sun screens of SPF 30 or higher, sun protective clothing, and avoidance of tanning beds.    Procedures Performed:   - Cryotherapy procedure note, location(s): left knee. After verbal consent and discussion of risks and benefits including, but not limited to, dyspigmentation/scar, blister, and pain, 1 inflamed SK lesion(s) was(were) treated with 1-2 mm freeze border for 1-2 cycles with liquid nitrogen. Post cryotherapy instructions were provided.    Follow-up: 1 year(s) in-person, or earlier for new or changing lesions    Staff and Scribe:     Scribe Disclosure:  I, Caty Martinez, am serving as a scribe to document services personally performed by Fitz Frias MD based on data collection and the provider's statements to me.     Provider Disclosure:   The documentation recorded by the scribe accurately reflects the services I personally performed and the decisions made by me.    Fitz Frias MD    Department of Dermatology  [facility]: Phone: [phone], Fax:[phone]  [facility]: Phone: [phone] Fax:  [phone]  ____________________________________________    CC: Skin Check (pt states she is here for a full body skin check, spot on left leg)    HPI:  Ms. Vianca Valle is a(n) 36 year old female who presents today as a return patient for full body skin examination. The patient was last seen in dermatology on 3/19/19 by Dr. Donis for a skin check at which point there were no concerning nevi.     Today, patient reports a few areas of concern including (1) mole on the buttock (2) mole on the right shoulder, (3) wart spot on the left knee.     The patient otherwise denies any new or concerning lesions. No bleeding, painful, pruritic, or changing lesions. They report no personal history of skin cancer. There is a family history of skin cancer, maternal uncle, maternal grandparents (unknown type). No history of immunosuppression. There is a remote history of indoor tanning in teenage years. They do use sunscreen and protective clothing when outdoors for sun protection. No occupational exposure to ultraviolet light or other forms of radiation. Patient works at [employer] in [employer]. Health otherwise stable. No other skin concerns.     Patient is otherwise feeling well, without additional skin concerns.    Labs Reviewed:  N/A    Physical Exam:  Vitals: LMP 03/04/2021 (Exact Date)   SKIN: Full skin, which includes the head/face, both arms, chest, back, abdomen,both legs, genitalia and/or groin buttocks, digits and/or nails, was examined.  - Brown macules on sun exposed areas  - Brown macules and papules (>100) on trunk and extremities with normal reticular network and without concerning dermoscopy features  - Red vascular papules on face, trunk and extremities.   - On the left knee, there is a verrucous appearing plaque with few thromboses capillaries appreciated under dermoscopy.    - No other lesions of concern on areas examined.     Medications:  Current Outpatient Medications   Medication     acetone  urine (KETOSTIX) test strip     blood glucose (NO BRAND SPECIFIED) lancets standard     blood glucose (NO BRAND SPECIFIED) test strip     blood glucose monitoring (NO BRAND SPECIFIED) meter device kit     Docosahexaenoic Acid (DHA PO)     Prenatal Vit-Fe Fumarate-FA (PRENATAL PO)     probiotic CAPS     No current facility-administered medications for this visit.      Past Medical History:   Patient Active Problem List   Diagnosis     Seasonal allergic rhinitis     S/P  section     Dichorionic diamniotic twin pregnancy in second trimester     Past Medical History:   Diagnosis Date     Allergic rhinitis      Depressive disorder      Hematuria 2017     Ruptured ear drum, right 2016     Urinary tract infection      Varicella      Venereal warts

## 2021-10-26 ENCOUNTER — OFFICE VISIT (OUTPATIENT)
Dept: DERMATOLOGY | Facility: CLINIC | Age: 36
End: 2021-10-26
Payer: COMMERCIAL

## 2021-10-26 DIAGNOSIS — D18.01 CHERRY ANGIOMA: ICD-10-CM

## 2021-10-26 DIAGNOSIS — D22.9 MULTIPLE BENIGN NEVI: ICD-10-CM

## 2021-10-26 DIAGNOSIS — L82.0 INFLAMED SEBORRHEIC KERATOSIS: Primary | ICD-10-CM

## 2021-10-26 DIAGNOSIS — L81.4 SOLAR LENTIGO: ICD-10-CM

## 2021-10-26 PROCEDURE — 99213 OFFICE O/P EST LOW 20 MIN: CPT | Mod: 25 | Performed by: DERMATOLOGY

## 2021-10-26 PROCEDURE — 17110 DESTRUCTION B9 LES UP TO 14: CPT | Performed by: DERMATOLOGY

## 2021-10-26 ASSESSMENT — PAIN SCALES - GENERAL: PAINLEVEL: NO PAIN (0)

## 2021-10-26 NOTE — LETTER
10/26/2021       RE: Vianca Valle  [address] [place] [address]     Dear Colleague,    Thank you for referring your patient, Vianca Valle, to the [facility] [place] [facility]. Please see a copy of my visit note below.    [facility] Dermatology Note  Encounter Date: Oct 26, 2021  Office Visit     Dermatology Problem List:  1. Eczematous dermatitis   ____________________________________________    Assessment & Plan:    # Inflamed verrucous seborrheic keratosis, left knee. Less likely considered verruca vulgaris.   - Cryotherapy performed today (see procedure note(s) below).  - If not improved in the next 4 weeks, return PRN for re-evaluation    # Benign lesions: Multiple benign nevi, solar lentigos, cherry angiomas. Explained to patient benign nature of lesion. No treatment is necessary at this time unless the lesion changes or becomes symptomatic.     - ABCDs of melanoma were discussed and self skin checks were advised.  - Sun precaution was advised including the use of sun screens of SPF 30 or higher, sun protective clothing, and avoidance of tanning beds.    Procedures Performed:   - Cryotherapy procedure note, location(s): left knee. After verbal consent and discussion of risks and benefits including, but not limited to, dyspigmentation/scar, blister, and pain, 1 inflamed SK lesion(s) was(were) treated with 1-2 mm freeze border for 1-2 cycles with liquid nitrogen. Post cryotherapy instructions were provided.    Follow-up: 1 year(s) in-person, or earlier for new or changing lesions    Staff and Scribe:     Scribe Disclosure:  I, Caty Martinez, am serving as a scribe to document services personally performed by Fitz Frias MD based on data collection and the provider's statements to me.     Provider Disclosure:   The documentation recorded by the scribe accurately reflects the services I  personally performed and the decisions made by me.    Fitz Frias MD    Department of Dermatology  [facility]: Phone: [phone], Fax:[phone]  [facility]: Phone: [phone] Fax: [phone]  ____________________________________________    CC: Skin Check (pt states she is here for a full body skin check, spot on left leg)    HPI:  Ms. Vianca Valle is a(n) 36 year old female who presents today as a return patient for full body skin examination. The patient was last seen in dermatology on 3/19/19 by Dr. Donis for a skin check at which point there were no concerning nevi.     Today, patient reports a few areas of concern including (1) mole on the buttock (2) mole on the right shoulder, (3) wart spot on the left knee.     The patient otherwise denies any new or concerning lesions. No bleeding, painful, pruritic, or changing lesions. They report no personal history of skin cancer. There is a family history of skin cancer, maternal uncle, maternal grandparents (unknown type). No history of immunosuppression. There is a remote history of indoor tanning in teenage years. They do use sunscreen and protective clothing when outdoors for sun protection. No occupational exposure to ultraviolet light or other forms of radiation. Patient works at [employer] in [employer]. Health otherwise stable. No other skin concerns.     Patient is otherwise feeling well, without additional skin concerns.    Labs Reviewed:  N/A    Physical Exam:  Vitals: LMP 03/04/2021 (Exact Date)   SKIN: Full skin, which includes the head/face, both arms, chest, back, abdomen,both legs, genitalia and/or groin buttocks, digits and/or nails, was examined.  - Brown macules on sun exposed areas  - Brown macules and papules (>100) on trunk and extremities with normal reticular network and without concerning dermoscopy  features  - Red vascular papules on face, trunk and extremities.   - On the left knee, there is a verrucous appearing plaque with few thromboses capillaries appreciated under dermoscopy.    - No other lesions of concern on areas examined.     Medications:  Current Outpatient Medications   Medication     acetone urine (KETOSTIX) test strip     blood glucose (NO BRAND SPECIFIED) lancets standard     blood glucose (NO BRAND SPECIFIED) test strip     blood glucose monitoring (NO BRAND SPECIFIED) meter device kit     Docosahexaenoic Acid (DHA PO)     Prenatal Vit-Fe Fumarate-FA (PRENATAL PO)     probiotic CAPS     No current facility-administered medications for this visit.      Past Medical History:   Patient Active Problem List   Diagnosis     Seasonal allergic rhinitis     S/P  section     Dichorionic diamniotic twin pregnancy in second trimester     Past Medical History:   Diagnosis Date     Allergic rhinitis      Depressive disorder      Hematuria 2017     Ruptured ear drum, right 2016     Urinary tract infection      Varicella      Venereal warts

## 2021-10-27 ENCOUNTER — HOSPITAL ENCOUNTER (OUTPATIENT)
Dept: ULTRASOUND IMAGING | Facility: CLINIC | Age: 36
End: 2021-10-27
Attending: OBSTETRICS & GYNECOLOGY
Payer: COMMERCIAL

## 2021-10-27 ENCOUNTER — PRENATAL OFFICE VISIT (OUTPATIENT)
Dept: OBGYN | Facility: CLINIC | Age: 36
End: 2021-10-27
Attending: OBSTETRICS & GYNECOLOGY
Payer: COMMERCIAL

## 2021-10-27 ENCOUNTER — OFFICE VISIT (OUTPATIENT)
Dept: MATERNAL FETAL MEDICINE | Facility: CLINIC | Age: 36
End: 2021-10-27
Attending: OBSTETRICS & GYNECOLOGY
Payer: COMMERCIAL

## 2021-10-27 VITALS — SYSTOLIC BLOOD PRESSURE: 118 MMHG | BODY MASS INDEX: 34.01 KG/M2 | DIASTOLIC BLOOD PRESSURE: 84 MMHG | WEIGHT: 192 LBS

## 2021-10-27 DIAGNOSIS — O36.5990 PREGNANCY AFFECTED BY FETAL GROWTH RESTRICTION: ICD-10-CM

## 2021-10-27 DIAGNOSIS — O36.5931 POOR FETAL GROWTH AFFECTING MANAGEMENT OF MOTHER IN THIRD TRIMESTER, FETUS 1: Primary | ICD-10-CM

## 2021-10-27 DIAGNOSIS — O30.043 DICHORIONIC DIAMNIOTIC TWIN PREGNANCY IN THIRD TRIMESTER: Primary | ICD-10-CM

## 2021-10-27 DIAGNOSIS — N89.8 VAGINAL DISCHARGE: ICD-10-CM

## 2021-10-27 DIAGNOSIS — O24.410 DIET CONTROLLED GESTATIONAL DIABETES MELLITUS (GDM) IN THIRD TRIMESTER: ICD-10-CM

## 2021-10-27 LAB
CLUE CELLS: ABNORMAL
TRICHOMONAS, WET PREP: ABNORMAL
WBC'S/HIGH POWER FIELD, WET PREP: ABNORMAL
YEAST, WET PREP: ABNORMAL

## 2021-10-27 PROCEDURE — 76820 UMBILICAL ARTERY ECHO: CPT | Mod: 59

## 2021-10-27 PROCEDURE — 87210 SMEAR WET MOUNT SALINE/INK: CPT | Performed by: OBSTETRICS & GYNECOLOGY

## 2021-10-27 PROCEDURE — 59025 FETAL NON-STRESS TEST: CPT | Mod: 26 | Performed by: OBSTETRICS & GYNECOLOGY

## 2021-10-27 PROCEDURE — 76815 OB US LIMITED FETUS(S): CPT | Mod: 26 | Performed by: OBSTETRICS & GYNECOLOGY

## 2021-10-27 PROCEDURE — 76820 UMBILICAL ARTERY ECHO: CPT | Mod: 26 | Performed by: OBSTETRICS & GYNECOLOGY

## 2021-10-27 PROCEDURE — 59025 FETAL NON-STRESS TEST: CPT

## 2021-10-27 PROCEDURE — 59025 FETAL NON-STRESS TEST: CPT | Mod: 59

## 2021-10-27 PROCEDURE — 99207 PR PRENATAL VISIT: CPT | Performed by: OBSTETRICS & GYNECOLOGY

## 2021-10-27 PROCEDURE — 76815 OB US LIMITED FETUS(S): CPT

## 2021-10-27 RX ORDER — CALCIUM POLYCARBOPHIL 625 MG 625 MG/1
2 TABLET ORAL DAILY
COMMUNITY
End: 2024-05-29

## 2021-10-27 NOTE — PROGRESS NOTES
35 yo  at 33w6d with di/di twin gestation.  MFM following for FGR - has visit today.  Br/Vtx at last check.  RCS planned with timing to be determined by MFM's assessment of FGR concerns.  Increased greenish discharge and hemorrhoid.  Using Prep H. And witch hazel pads.  Non thrombosed.  Wet prep obtained.  RTC 1 week or as advised by MFM

## 2021-10-29 ENCOUNTER — OFFICE VISIT (OUTPATIENT)
Dept: MATERNAL FETAL MEDICINE | Facility: CLINIC | Age: 36
End: 2021-10-29
Attending: OBSTETRICS & GYNECOLOGY
Payer: COMMERCIAL

## 2021-10-29 ENCOUNTER — HOSPITAL ENCOUNTER (OUTPATIENT)
Dept: ULTRASOUND IMAGING | Facility: CLINIC | Age: 36
End: 2021-10-29
Attending: OBSTETRICS & GYNECOLOGY
Payer: COMMERCIAL

## 2021-10-29 DIAGNOSIS — O36.5990 PREGNANCY AFFECTED BY FETAL GROWTH RESTRICTION: ICD-10-CM

## 2021-10-29 DIAGNOSIS — O36.5931 POOR FETAL GROWTH AFFECTING MANAGEMENT OF MOTHER IN THIRD TRIMESTER, FETUS 1: ICD-10-CM

## 2021-10-29 PROCEDURE — 59025 FETAL NON-STRESS TEST: CPT | Mod: 59

## 2021-10-29 PROCEDURE — 76820 UMBILICAL ARTERY ECHO: CPT | Mod: 59

## 2021-10-29 PROCEDURE — 76815 OB US LIMITED FETUS(S): CPT

## 2021-10-29 PROCEDURE — 59025 FETAL NON-STRESS TEST: CPT

## 2021-10-29 PROCEDURE — 59025 FETAL NON-STRESS TEST: CPT | Mod: 26 | Performed by: OBSTETRICS & GYNECOLOGY

## 2021-10-29 PROCEDURE — 76815 OB US LIMITED FETUS(S): CPT | Mod: 26 | Performed by: OBSTETRICS & GYNECOLOGY

## 2021-10-29 PROCEDURE — 76820 UMBILICAL ARTERY ECHO: CPT | Mod: 26 | Performed by: OBSTETRICS & GYNECOLOGY

## 2021-11-01 ENCOUNTER — MYC MEDICAL ADVICE (OUTPATIENT)
Dept: EDUCATION SERVICES | Facility: CLINIC | Age: 36
End: 2021-11-01

## 2021-11-02 ENCOUNTER — OFFICE VISIT (OUTPATIENT)
Dept: MATERNAL FETAL MEDICINE | Facility: CLINIC | Age: 36
End: 2021-11-02
Attending: OBSTETRICS & GYNECOLOGY
Payer: COMMERCIAL

## 2021-11-02 ENCOUNTER — HOSPITAL ENCOUNTER (OUTPATIENT)
Dept: ULTRASOUND IMAGING | Facility: CLINIC | Age: 36
End: 2021-11-02
Attending: OBSTETRICS & GYNECOLOGY
Payer: COMMERCIAL

## 2021-11-02 ENCOUNTER — HOSPITAL ENCOUNTER (OUTPATIENT)
Facility: CLINIC | Age: 36
Discharge: HOME | End: 2021-11-02
Attending: OBSTETRICS & GYNECOLOGY | Admitting: OBSTETRICS & GYNECOLOGY
Payer: COMMERCIAL

## 2021-11-02 VITALS
WEIGHT: 195 LBS | BODY MASS INDEX: 34.55 KG/M2 | TEMPERATURE: 97.9 F | DIASTOLIC BLOOD PRESSURE: 83 MMHG | OXYGEN SATURATION: 97 % | SYSTOLIC BLOOD PRESSURE: 133 MMHG | HEIGHT: 63 IN

## 2021-11-02 VITALS — SYSTOLIC BLOOD PRESSURE: 130 MMHG | HEART RATE: 86 BPM | DIASTOLIC BLOOD PRESSURE: 87 MMHG

## 2021-11-02 DIAGNOSIS — O36.5990 PREGNANCY AFFECTED BY FETAL GROWTH RESTRICTION: ICD-10-CM

## 2021-11-02 DIAGNOSIS — O30.043 DICHORIONIC DIAMNIOTIC TWIN PREGNANCY IN THIRD TRIMESTER: Primary | ICD-10-CM

## 2021-11-02 DIAGNOSIS — O36.5931 POOR FETAL GROWTH AFFECTING MANAGEMENT OF MOTHER IN THIRD TRIMESTER, FETUS 1: ICD-10-CM

## 2021-11-02 PROBLEM — Z36.89 ENCOUNTER FOR TRIAGE IN PREGNANT PATIENT: Status: ACTIVE | Noted: 2021-11-02

## 2021-11-02 PROCEDURE — 76820 UMBILICAL ARTERY ECHO: CPT | Mod: 26 | Performed by: OBSTETRICS & GYNECOLOGY

## 2021-11-02 PROCEDURE — 59025 FETAL NON-STRESS TEST: CPT | Mod: 26 | Performed by: OBSTETRICS & GYNECOLOGY

## 2021-11-02 PROCEDURE — 59025 FETAL NON-STRESS TEST: CPT | Mod: 59

## 2021-11-02 PROCEDURE — 76815 OB US LIMITED FETUS(S): CPT | Mod: 26 | Performed by: OBSTETRICS & GYNECOLOGY

## 2021-11-02 PROCEDURE — 76815 OB US LIMITED FETUS(S): CPT

## 2021-11-02 PROCEDURE — 76820 UMBILICAL ARTERY ECHO: CPT | Mod: 59

## 2021-11-02 PROCEDURE — G0463 HOSPITAL OUTPT CLINIC VISIT: HCPCS | Mod: 25

## 2021-11-02 PROCEDURE — 59025 FETAL NON-STRESS TEST: CPT

## 2021-11-02 PROCEDURE — 99207 PR NO BILLABLE SERVICE THIS VISIT: CPT | Performed by: OBSTETRICS & GYNECOLOGY

## 2021-11-02 RX ORDER — LIDOCAINE 40 MG/G
CREAM TOPICAL
Status: DISCONTINUED | OUTPATIENT
Start: 2021-11-02 | End: 2021-11-02 | Stop reason: HOSPADM

## 2021-11-02 ASSESSMENT — MIFFLIN-ST. JEOR: SCORE: 1538.64

## 2021-11-02 NOTE — TELEPHONE ENCOUNTER
Gestational Diabetes Follow-up    Subjective/Objective:    Vianca Valle sent in blood glucose log for review. Last date of communication was: 10/25/2021.    Gestational diabetes is being managed with diet and activity    Taking diabetes medications: no    Estimated Date of Delivery: Dec 9, 2021    BG/Food Log:       Assessment:    Ketones: neg.   Fasting blood glucoses: 100% in target.  After breakfast: 100% in target.  After lunch: 100% in target.  After dinner: 71% in target.    Plan/Response:  No changes in the patient's current treatment plan.  Follow-up in 1 week.    BRIANA Varner CDCES    Any diabetes medication dose changes were made via the CDE Protocol and Collaborative Practice Agreement with the patient's OB/GYN provider. A copy of this encounter was shared with the provider.

## 2021-11-02 NOTE — PLAN OF CARE
Data: Patient assessed in the [facility] for NST/fetal surveillance .  Cervical exam not examined.  Membranes intact.  Contractions/uterine assessment some irritability noted, un palpable and pt not feeling.  Action:  Presumed adequate fetal oxygenation documented (see flow record). Discharge instructions reviewed.  Patient instructed to report change in fetal movement, vaginal leaking of fluid or bleeding, abdominal pain, or any concerns related to the pregnancy to her nurse/physician.    Response: Orders to discharge home per Susan Pino.  Patient verbalized understanding of education and verbalized agreement with plan. Discharged to home at 1730. Pt has appt on Friday with OB and MFM.

## 2021-11-02 NOTE — PROGRESS NOTES
Vianca Valle was seen for an ultrasound today at the Maternal-Fetal Medicine center.      For the details of the ultrasound please see the report which can be found under the imaging tab.      Che Glez MD  , OB/GYN  Maternal-Fetal Medicine  [email or web address]  [phone] (Main MFM Office)  [phone] or [phone] (for 24 hour MFM questions)  [phone] (Pager)

## 2021-11-02 NOTE — PLAN OF CARE
Data: Patient presented to [facility]: 2021  4:30 PM.  Reason for maternal/fetal assessment is NST/fetal surveillance . Patient reports being in [facility] clinic for scheduled visit, MFM reported a decel during monitoring w/baby A, they also reported pt having increased swelling, bp wnl [facility].  Patient is a .  Prenatal record reviewed. Pregnancy  has been complicated by gestational diabetes, diet controlled, IUGR and twin pregnancy.  Gestational Age 34w5d. VSS. Fetal movement active. Patient denies uterine contractions, leaking of vaginal fluid/rupture of membranes, vaginal bleeding, abdominal pain, vomiting, headache, visual disturbances, epigastric or URQ pain. Support person is not present.   Action: Verbal consent for EFM. Triage assessment completed. Bill of rights reviewed.  Response: Patient verbalized agreement with plan. Will contact Dr Susan Pino with update and for further orders.

## 2021-11-02 NOTE — NURSING NOTE
NST Performed due to FGR.   reviewed efm tracing. See NST/BPP Doc Flowsheet tab.    Patient reported to RN while being monitored that over the last few days her hands are swollen and uncomfortable while she sleeps and in the morning that improves through the day. She declines any headache or visual changes and feels some upper right sided discomfort more so with fetal position changes and movements. Blood pressure taken and in VS. Reviewed above with Dr. Glez who recommends clinic visit later this week as next prenatal visit is not until mid next week. Appointment obtained with Dr. Shay for 11/5 at 2pm prior to MFM appt, patient agreeable.

## 2021-11-02 NOTE — NURSING NOTE
Vianca here for [facility] ultrasound. After review of ultrasound and NST, Dr. Glez requesting patient go to L/D for extended montioring due to  a small, subtle two minute deceleration for twin 1 to 110. Labor and delivery updated and report given. Patient agreeable and all questions answered.

## 2021-11-02 NOTE — DISCHARGE INSTRUCTIONS
Discharge Instruction for Undelivered Patients      You were seen for: Fetal Assessment  We Consulted: Dr. Gordon Pino  You had (Test or Medicine):Non-stress test     Diet:   Drink 8 to 12 glasses of liquids (milk, juice, water) every day.     Activity:  Call your doctor or nurse midwife if your baby is moving less than usual.     Call your provider if you notice:  Swelling in your face or increased swelling in your hands or legs.  Headaches that are not relieved by Tylenol (acetaminophen).  Changes in your vision (blurring: seeing spots or stars.)  Nausea (sick to your stomach) and vomiting (throwing up).   Weight gain of 5 pounds or more per week.  Heartburn that doesn't go away.  Signs of bladder infection: pain when you urinate (use the toilet), need to go more often and more urgently.  The bag of murphy (rupture of membranes) breaks, or you notice leaking in your underwear.  Bright red blood in your underwear.  Abdominal (lower belly) or stomach pain.  For first baby: Contractions (tightening) less than 5 minutes apart for one hour or more.  Second (plus) baby: Contractions (tightening) less than 10 minutes apart and getting stronger.  *If less than 34 weeks: Contractions (tightening) more than 6 times in one hour.  Increase or change in vaginal discharge (note the color and amount)  Other:     Follow-up:  As scheduled in the clinic

## 2021-11-05 ENCOUNTER — HOSPITAL ENCOUNTER (OUTPATIENT)
Dept: ULTRASOUND IMAGING | Facility: CLINIC | Age: 36
End: 2021-11-05
Attending: OBSTETRICS & GYNECOLOGY
Payer: COMMERCIAL

## 2021-11-05 ENCOUNTER — PRENATAL OFFICE VISIT (OUTPATIENT)
Dept: OBGYN | Facility: CLINIC | Age: 36
End: 2021-11-05
Payer: COMMERCIAL

## 2021-11-05 ENCOUNTER — OFFICE VISIT (OUTPATIENT)
Dept: MATERNAL FETAL MEDICINE | Facility: CLINIC | Age: 36
End: 2021-11-05
Attending: OBSTETRICS & GYNECOLOGY
Payer: COMMERCIAL

## 2021-11-05 VITALS — WEIGHT: 196 LBS | SYSTOLIC BLOOD PRESSURE: 130 MMHG | BODY MASS INDEX: 34.72 KG/M2 | DIASTOLIC BLOOD PRESSURE: 82 MMHG

## 2021-11-05 DIAGNOSIS — O30.043 DICHORIONIC DIAMNIOTIC TWIN PREGNANCY IN THIRD TRIMESTER: Primary | ICD-10-CM

## 2021-11-05 DIAGNOSIS — O36.5990 PREGNANCY AFFECTED BY FETAL GROWTH RESTRICTION: ICD-10-CM

## 2021-11-05 DIAGNOSIS — O24.410 DIET CONTROLLED GESTATIONAL DIABETES MELLITUS (GDM) IN THIRD TRIMESTER: ICD-10-CM

## 2021-11-05 DIAGNOSIS — O36.5931 POOR FETAL GROWTH AFFECTING MANAGEMENT OF MOTHER IN THIRD TRIMESTER, FETUS 1: ICD-10-CM

## 2021-11-05 PROCEDURE — 99207 PR PRENATAL VISIT: CPT | Performed by: OBSTETRICS & GYNECOLOGY

## 2021-11-05 PROCEDURE — 76820 UMBILICAL ARTERY ECHO: CPT | Mod: 26 | Performed by: OBSTETRICS & GYNECOLOGY

## 2021-11-05 PROCEDURE — 59025 FETAL NON-STRESS TEST: CPT

## 2021-11-05 PROCEDURE — 59025 FETAL NON-STRESS TEST: CPT | Mod: 26 | Performed by: OBSTETRICS & GYNECOLOGY

## 2021-11-05 PROCEDURE — 76815 OB US LIMITED FETUS(S): CPT | Mod: 26 | Performed by: OBSTETRICS & GYNECOLOGY

## 2021-11-05 PROCEDURE — 76820 UMBILICAL ARTERY ECHO: CPT | Mod: 59

## 2021-11-05 PROCEDURE — 59025 FETAL NON-STRESS TEST: CPT | Mod: 59

## 2021-11-05 PROCEDURE — 76815 OB US LIMITED FETUS(S): CPT

## 2021-11-05 NOTE — LETTER
November 5, 2021      Re: Vianca Valle  [address] [place] [address]        To Whom It May Concern:    Vianca Valle  was seen on 11/05/21.  Please excuse her from work beginning after 11/12/2021 due to pregnancy and anticipated delivery.        Sincerely,        Norma Shay MD

## 2021-11-05 NOTE — NURSING NOTE
NST Performed due to FGR, di/di twins.  Dr. Odell reviewed efm tracing. See NST/BPP Doc Flowsheet tab.

## 2021-11-05 NOTE — PROGRESS NOTES
Here for follow up ob visit at 35w1d  Di-di twins, plans to deliver via RCS but not scheduled yet pending decision on timing given FGR twin A and prior low transverse .    Feeling good fetal movement, no concerns.  Discussed right arm tingling, sounds like nerve compression, worse lying down at night.  Reviewed signs and symptoms of preeclampsia, labor.  Note given to stop work next week.  Will make plans for  timing with Dr. Leyva likely after her appointment next week for growth scan.    Norma Shay MD

## 2021-11-05 NOTE — NURSING NOTE
"Chief Complaint   Patient presents with     Prenatal Care       Initial /82   Wt 88.9 kg (196 lb)   LMP 2021 (Exact Date)   Breastfeeding No   BMI 34.72 kg/m   Estimated body mass index is 34.72 kg/m  as calculated from the following:    Height as of 21: 1.6 m (5' 3\").    Weight as of this encounter: 88.9 kg (196 lb).  BP completed using cuff size: regular    Questioned patient about current smoking habits.  Pt. quit smoking some time ago.          35w1d  + FM daily  - cramping or bleeding  - edema in feet  + tingling in fingers and R arm  Susu Simon LPN               "

## 2021-11-07 ENCOUNTER — HOSPITAL ENCOUNTER (INPATIENT)
Facility: CLINIC | Age: 36
LOS: 2 days | Discharge: HOME | End: 2021-11-09
Attending: FAMILY MEDICINE | Admitting: FAMILY MEDICINE
Payer: COMMERCIAL

## 2021-11-07 ENCOUNTER — NURSE TRIAGE (OUTPATIENT)
Dept: NURSING | Facility: CLINIC | Age: 36
End: 2021-11-07
Payer: COMMERCIAL

## 2021-11-07 ENCOUNTER — ANESTHESIA EVENT (OUTPATIENT)
Dept: OBGYN | Facility: CLINIC | Age: 36
End: 2021-11-07
Payer: COMMERCIAL

## 2021-11-07 ENCOUNTER — ANESTHESIA (OUTPATIENT)
Dept: OBGYN | Facility: CLINIC | Age: 36
End: 2021-11-07
Payer: COMMERCIAL

## 2021-11-07 DIAGNOSIS — Z98.891 S/P CESAREAN SECTION: ICD-10-CM

## 2021-11-07 DIAGNOSIS — O30.042 DICHORIONIC DIAMNIOTIC TWIN PREGNANCY IN SECOND TRIMESTER: Primary | ICD-10-CM

## 2021-11-07 LAB
ABO/RH(D): NORMAL
ANTIBODY SCREEN: NEGATIVE
FASTING STATUS PATIENT QL REPORTED: NO
GLUCOSE BLD-MCNC: 71 MG/DL (ref 70–99)
GLUCOSE BLDC GLUCOMTR-MCNC: 78 MG/DL (ref 70–99)
HGB BLD-MCNC: 12.6 G/DL (ref 11.7–15.7)
RUPTURE OF FETAL MEMBRANES BY ROM PLUS: POSITIVE
SARS-COV-2 RNA RESP QL NAA+PROBE: NEGATIVE
SPECIMEN EXPIRATION DATE: NORMAL

## 2021-11-07 PROCEDURE — 59510 CESAREAN DELIVERY: CPT | Mod: 22 | Performed by: FAMILY MEDICINE

## 2021-11-07 PROCEDURE — 250N000009 HC RX 250: Performed by: NURSE ANESTHETIST, CERTIFIED REGISTERED

## 2021-11-07 PROCEDURE — 250N000011 HC RX IP 250 OP 636: Performed by: NURSE ANESTHETIST, CERTIFIED REGISTERED

## 2021-11-07 PROCEDURE — 88342 IMHCHEM/IMCYTCHM 1ST ANTB: CPT | Mod: 26 | Performed by: PATHOLOGY

## 2021-11-07 PROCEDURE — 82947 ASSAY GLUCOSE BLOOD QUANT: CPT | Performed by: FAMILY MEDICINE

## 2021-11-07 PROCEDURE — 85018 HEMOGLOBIN: CPT | Performed by: FAMILY MEDICINE

## 2021-11-07 PROCEDURE — 710N000009 HC RECOVERY PHASE 1, LEVEL 1, PER MIN: Performed by: FAMILY MEDICINE

## 2021-11-07 PROCEDURE — 272N000001 HC OR GENERAL SUPPLY STERILE: Performed by: FAMILY MEDICINE

## 2021-11-07 PROCEDURE — 250N000011 HC RX IP 250 OP 636: Performed by: FAMILY MEDICINE

## 2021-11-07 PROCEDURE — 258N000003 HC RX IP 258 OP 636: Performed by: NURSE ANESTHETIST, CERTIFIED REGISTERED

## 2021-11-07 PROCEDURE — 84112 EVAL AMNIOTIC FLUID PROTEIN: CPT | Performed by: FAMILY MEDICINE

## 2021-11-07 PROCEDURE — 258N000003 HC RX IP 258 OP 636: Performed by: FAMILY MEDICINE

## 2021-11-07 PROCEDURE — 370N000017 HC ANESTHESIA TECHNICAL FEE, PER MIN: Performed by: FAMILY MEDICINE

## 2021-11-07 PROCEDURE — 88342 IMHCHEM/IMCYTCHM 1ST ANTB: CPT | Mod: TC | Performed by: FAMILY MEDICINE

## 2021-11-07 PROCEDURE — 88341 IMHCHEM/IMCYTCHM EA ADD ANTB: CPT | Mod: 26 | Performed by: PATHOLOGY

## 2021-11-07 PROCEDURE — 360N000076 HC SURGERY LEVEL 3, PER MIN: Performed by: FAMILY MEDICINE

## 2021-11-07 PROCEDURE — 250N000009 HC RX 250: Performed by: FAMILY MEDICINE

## 2021-11-07 PROCEDURE — 86780 TREPONEMA PALLIDUM: CPT | Performed by: FAMILY MEDICINE

## 2021-11-07 PROCEDURE — 86900 BLOOD TYPING SEROLOGIC ABO: CPT | Performed by: FAMILY MEDICINE

## 2021-11-07 PROCEDURE — G0463 HOSPITAL OUTPT CLINIC VISIT: HCPCS

## 2021-11-07 PROCEDURE — 120N000001 HC R&B MED SURG/OB

## 2021-11-07 PROCEDURE — 250N000013 HC RX MED GY IP 250 OP 250 PS 637: Performed by: FAMILY MEDICINE

## 2021-11-07 PROCEDURE — 87635 SARS-COV-2 COVID-19 AMP PRB: CPT | Performed by: FAMILY MEDICINE

## 2021-11-07 PROCEDURE — 250N000011 HC RX IP 250 OP 636

## 2021-11-07 PROCEDURE — C1765 ADHESION BARRIER: HCPCS | Performed by: FAMILY MEDICINE

## 2021-11-07 PROCEDURE — 88307 TISSUE EXAM BY PATHOLOGIST: CPT | Mod: 26 | Performed by: PATHOLOGY

## 2021-11-07 RX ORDER — OXYTOCIN 10 [USP'U]/ML
10 INJECTION, SOLUTION INTRAMUSCULAR; INTRAVENOUS
Status: CANCELLED | OUTPATIENT
Start: 2021-11-07

## 2021-11-07 RX ORDER — AZITHROMYCIN 500 MG/5ML
500 INJECTION, POWDER, LYOPHILIZED, FOR SOLUTION INTRAVENOUS
Status: COMPLETED | OUTPATIENT
Start: 2021-11-07 | End: 2021-11-07

## 2021-11-07 RX ORDER — MORPHINE SULFATE 1 MG/ML
INJECTION, SOLUTION EPIDURAL; INTRATHECAL; INTRAVENOUS
Status: COMPLETED | OUTPATIENT
Start: 2021-11-07 | End: 2021-11-07

## 2021-11-07 RX ORDER — OXYTOCIN 10 [USP'U]/ML
10 INJECTION, SOLUTION INTRAMUSCULAR; INTRAVENOUS
Status: DISCONTINUED | OUTPATIENT
Start: 2021-11-07 | End: 2021-11-09 | Stop reason: HOSPADM

## 2021-11-07 RX ORDER — SODIUM CHLORIDE, SODIUM LACTATE, POTASSIUM CHLORIDE, CALCIUM CHLORIDE 600; 310; 30; 20 MG/100ML; MG/100ML; MG/100ML; MG/100ML
INJECTION, SOLUTION INTRAVENOUS CONTINUOUS
Status: DISCONTINUED | OUTPATIENT
Start: 2021-11-07 | End: 2021-11-07 | Stop reason: HOSPADM

## 2021-11-07 RX ORDER — METOCLOPRAMIDE 10 MG/1
10 TABLET ORAL EVERY 6 HOURS PRN
Status: DISCONTINUED | OUTPATIENT
Start: 2021-11-07 | End: 2021-11-09 | Stop reason: HOSPADM

## 2021-11-07 RX ORDER — SIMETHICONE 80 MG
80 TABLET,CHEWABLE ORAL 4 TIMES DAILY PRN
Status: DISCONTINUED | OUTPATIENT
Start: 2021-11-07 | End: 2021-11-09 | Stop reason: HOSPADM

## 2021-11-07 RX ORDER — AMOXICILLIN 250 MG
2 CAPSULE ORAL 2 TIMES DAILY
Status: DISCONTINUED | OUTPATIENT
Start: 2021-11-07 | End: 2021-11-09 | Stop reason: HOSPADM

## 2021-11-07 RX ORDER — PHENYLEPHRINE HYDROCHLORIDE 10 MG/ML
INJECTION INTRAVENOUS PRN
Status: DISCONTINUED | OUTPATIENT
Start: 2021-11-07 | End: 2021-11-07

## 2021-11-07 RX ORDER — ALBUTEROL SULFATE 0.83 MG/ML
2.5 SOLUTION RESPIRATORY (INHALATION) EVERY 4 HOURS PRN
Status: DISCONTINUED | OUTPATIENT
Start: 2021-11-07 | End: 2021-11-08 | Stop reason: HOSPADM

## 2021-11-07 RX ORDER — ONDANSETRON 2 MG/ML
4 INJECTION INTRAMUSCULAR; INTRAVENOUS EVERY 4 HOURS PRN
Status: DISCONTINUED | OUTPATIENT
Start: 2021-11-07 | End: 2021-11-07

## 2021-11-07 RX ORDER — OXYTOCIN/0.9 % SODIUM CHLORIDE 30/500 ML
340 PLASTIC BAG, INJECTION (ML) INTRAVENOUS CONTINUOUS PRN
Status: COMPLETED | OUTPATIENT
Start: 2021-11-07 | End: 2021-11-08

## 2021-11-07 RX ORDER — ONDANSETRON 4 MG/1
4 TABLET, ORALLY DISINTEGRATING ORAL EVERY 30 MIN PRN
Status: DISCONTINUED | OUTPATIENT
Start: 2021-11-07 | End: 2021-11-07

## 2021-11-07 RX ORDER — OXYCODONE HYDROCHLORIDE 5 MG/1
5 TABLET ORAL EVERY 4 HOURS PRN
Status: DISCONTINUED | OUTPATIENT
Start: 2021-11-07 | End: 2021-11-09 | Stop reason: HOSPADM

## 2021-11-07 RX ORDER — NALOXONE HYDROCHLORIDE 0.4 MG/ML
0.2 INJECTION, SOLUTION INTRAMUSCULAR; INTRAVENOUS; SUBCUTANEOUS
Status: DISCONTINUED | OUTPATIENT
Start: 2021-11-07 | End: 2021-11-07

## 2021-11-07 RX ORDER — CARBOPROST TROMETHAMINE 250 UG/ML
250 INJECTION, SOLUTION INTRAMUSCULAR
Status: DISCONTINUED | OUTPATIENT
Start: 2021-11-07 | End: 2021-11-07 | Stop reason: HOSPADM

## 2021-11-07 RX ORDER — OXYTOCIN/0.9 % SODIUM CHLORIDE 30/500 ML
100-340 PLASTIC BAG, INJECTION (ML) INTRAVENOUS CONTINUOUS PRN
Status: CANCELLED | OUTPATIENT
Start: 2021-11-07

## 2021-11-07 RX ORDER — ACETAMINOPHEN 325 MG/1
975 TABLET ORAL EVERY 6 HOURS
Status: DISCONTINUED | OUTPATIENT
Start: 2021-11-07 | End: 2021-11-09 | Stop reason: HOSPADM

## 2021-11-07 RX ORDER — SCOLOPAMINE TRANSDERMAL SYSTEM 1 MG/1
1 PATCH, EXTENDED RELEASE TRANSDERMAL ONCE
Status: DISCONTINUED | OUTPATIENT
Start: 2021-11-07 | End: 2021-11-07

## 2021-11-07 RX ORDER — TRANEXAMIC ACID 10 MG/ML
1 INJECTION, SOLUTION INTRAVENOUS EVERY 30 MIN PRN
Status: DISCONTINUED | OUTPATIENT
Start: 2021-11-07 | End: 2021-11-09 | Stop reason: HOSPADM

## 2021-11-07 RX ORDER — CEFAZOLIN SODIUM 2 G/100ML
2 INJECTION, SOLUTION INTRAVENOUS
Status: DISCONTINUED | OUTPATIENT
Start: 2021-11-07 | End: 2021-11-07 | Stop reason: HOSPADM

## 2021-11-07 RX ORDER — ONDANSETRON 4 MG/1
4 TABLET, ORALLY DISINTEGRATING ORAL EVERY 6 HOURS PRN
Status: DISCONTINUED | OUTPATIENT
Start: 2021-11-07 | End: 2021-11-09 | Stop reason: HOSPADM

## 2021-11-07 RX ORDER — ACETAMINOPHEN 325 MG/1
975 TABLET ORAL ONCE
Status: COMPLETED | OUTPATIENT
Start: 2021-11-07 | End: 2021-11-07

## 2021-11-07 RX ORDER — AMOXICILLIN 250 MG
1 CAPSULE ORAL 2 TIMES DAILY
Status: DISCONTINUED | OUTPATIENT
Start: 2021-11-07 | End: 2021-11-09 | Stop reason: HOSPADM

## 2021-11-07 RX ORDER — METHYLERGONOVINE MALEATE 0.2 MG/ML
200 INJECTION INTRAVENOUS
Status: DISCONTINUED | OUTPATIENT
Start: 2021-11-07 | End: 2021-11-07 | Stop reason: HOSPADM

## 2021-11-07 RX ORDER — MISOPROSTOL 200 UG/1
800 TABLET ORAL
Status: DISCONTINUED | OUTPATIENT
Start: 2021-11-07 | End: 2021-11-07 | Stop reason: HOSPADM

## 2021-11-07 RX ORDER — EPHEDRINE SULFATE 50 MG/ML
INJECTION, SOLUTION INTRAVENOUS PRN
Status: DISCONTINUED | OUTPATIENT
Start: 2021-11-07 | End: 2021-11-07

## 2021-11-07 RX ORDER — PROCHLORPERAZINE 25 MG
25 SUPPOSITORY, RECTAL RECTAL EVERY 12 HOURS PRN
Status: DISCONTINUED | OUTPATIENT
Start: 2021-11-07 | End: 2021-11-09 | Stop reason: HOSPADM

## 2021-11-07 RX ORDER — SODIUM CHLORIDE, SODIUM LACTATE, POTASSIUM CHLORIDE, CALCIUM CHLORIDE 600; 310; 30; 20 MG/100ML; MG/100ML; MG/100ML; MG/100ML
INJECTION, SOLUTION INTRAVENOUS CONTINUOUS PRN
Status: DISCONTINUED | OUTPATIENT
Start: 2021-11-07 | End: 2021-11-07

## 2021-11-07 RX ORDER — METOCLOPRAMIDE HYDROCHLORIDE 5 MG/ML
10 INJECTION INTRAMUSCULAR; INTRAVENOUS EVERY 6 HOURS PRN
Status: DISCONTINUED | OUTPATIENT
Start: 2021-11-07 | End: 2021-11-09 | Stop reason: HOSPADM

## 2021-11-07 RX ORDER — MORPHINE SULFATE 1 MG/ML
INJECTION, SOLUTION EPIDURAL; INTRATHECAL; INTRAVENOUS PRN
Status: DISCONTINUED | OUTPATIENT
Start: 2021-11-07 | End: 2021-11-07

## 2021-11-07 RX ORDER — CITRIC ACID/SODIUM CITRATE 334-500MG
30 SOLUTION, ORAL ORAL
Status: COMPLETED | OUTPATIENT
Start: 2021-11-07 | End: 2021-11-07

## 2021-11-07 RX ORDER — ONDANSETRON 2 MG/ML
4 INJECTION INTRAMUSCULAR; INTRAVENOUS EVERY 6 HOURS PRN
Status: DISCONTINUED | OUTPATIENT
Start: 2021-11-07 | End: 2021-11-09 | Stop reason: HOSPADM

## 2021-11-07 RX ORDER — DEXTROSE MONOHYDRATE 25 G/50ML
25-50 INJECTION, SOLUTION INTRAVENOUS
Status: DISCONTINUED | OUTPATIENT
Start: 2021-11-07 | End: 2021-11-09 | Stop reason: HOSPADM

## 2021-11-07 RX ORDER — LIDOCAINE 40 MG/G
CREAM TOPICAL
Status: DISCONTINUED | OUTPATIENT
Start: 2021-11-07 | End: 2021-11-09 | Stop reason: HOSPADM

## 2021-11-07 RX ORDER — MISOPROSTOL 200 UG/1
400 TABLET ORAL
Status: DISCONTINUED | OUTPATIENT
Start: 2021-11-07 | End: 2021-11-09 | Stop reason: HOSPADM

## 2021-11-07 RX ORDER — TRANEXAMIC ACID 10 MG/ML
1 INJECTION, SOLUTION INTRAVENOUS EVERY 30 MIN PRN
Status: DISCONTINUED | OUTPATIENT
Start: 2021-11-07 | End: 2021-11-07 | Stop reason: HOSPADM

## 2021-11-07 RX ORDER — NALOXONE HYDROCHLORIDE 0.4 MG/ML
0.4 INJECTION, SOLUTION INTRAMUSCULAR; INTRAVENOUS; SUBCUTANEOUS
Status: DISCONTINUED | OUTPATIENT
Start: 2021-11-07 | End: 2021-11-07

## 2021-11-07 RX ORDER — ONDANSETRON 2 MG/ML
INJECTION INTRAMUSCULAR; INTRAVENOUS PRN
Status: DISCONTINUED | OUTPATIENT
Start: 2021-11-07 | End: 2021-11-07

## 2021-11-07 RX ORDER — NALOXONE HYDROCHLORIDE 0.4 MG/ML
0.1 INJECTION, SOLUTION INTRAMUSCULAR; INTRAVENOUS; SUBCUTANEOUS
Status: DISCONTINUED | OUTPATIENT
Start: 2021-11-07 | End: 2021-11-07

## 2021-11-07 RX ORDER — CEFAZOLIN SODIUM 2 G/100ML
2 INJECTION, SOLUTION INTRAVENOUS SEE ADMIN INSTRUCTIONS
Status: DISCONTINUED | OUTPATIENT
Start: 2021-11-07 | End: 2021-11-07 | Stop reason: HOSPADM

## 2021-11-07 RX ORDER — OXYTOCIN 10 [USP'U]/ML
10 INJECTION, SOLUTION INTRAMUSCULAR; INTRAVENOUS
Status: DISCONTINUED | OUTPATIENT
Start: 2021-11-07 | End: 2021-11-07 | Stop reason: HOSPADM

## 2021-11-07 RX ORDER — PROCHLORPERAZINE MALEATE 10 MG
10 TABLET ORAL EVERY 6 HOURS PRN
Status: DISCONTINUED | OUTPATIENT
Start: 2021-11-07 | End: 2021-11-09 | Stop reason: HOSPADM

## 2021-11-07 RX ORDER — DEXTROSE, SODIUM CHLORIDE, SODIUM LACTATE, POTASSIUM CHLORIDE, AND CALCIUM CHLORIDE 5; .6; .31; .03; .02 G/100ML; G/100ML; G/100ML; G/100ML; G/100ML
INJECTION, SOLUTION INTRAVENOUS CONTINUOUS
Status: DISCONTINUED | OUTPATIENT
Start: 2021-11-07 | End: 2021-11-09 | Stop reason: HOSPADM

## 2021-11-07 RX ORDER — MISOPROSTOL 200 UG/1
800 TABLET ORAL
Status: DISCONTINUED | OUTPATIENT
Start: 2021-11-07 | End: 2021-11-09 | Stop reason: HOSPADM

## 2021-11-07 RX ORDER — MODIFIED LANOLIN
OINTMENT (GRAM) TOPICAL
Status: DISCONTINUED | OUTPATIENT
Start: 2021-11-07 | End: 2021-11-09 | Stop reason: HOSPADM

## 2021-11-07 RX ORDER — KETOROLAC TROMETHAMINE 30 MG/ML
INJECTION, SOLUTION INTRAMUSCULAR; INTRAVENOUS PRN
Status: DISCONTINUED | OUTPATIENT
Start: 2021-11-07 | End: 2021-11-07

## 2021-11-07 RX ORDER — BUPIVACAINE HYDROCHLORIDE 7.5 MG/ML
INJECTION, SOLUTION INTRASPINAL PRN
Status: DISCONTINUED | OUTPATIENT
Start: 2021-11-07 | End: 2021-11-07

## 2021-11-07 RX ORDER — OXYTOCIN/0.9 % SODIUM CHLORIDE 30/500 ML
340 PLASTIC BAG, INJECTION (ML) INTRAVENOUS CONTINUOUS PRN
Status: DISCONTINUED | OUTPATIENT
Start: 2021-11-07 | End: 2021-11-07 | Stop reason: HOSPADM

## 2021-11-07 RX ORDER — MISOPROSTOL 200 UG/1
400 TABLET ORAL
Status: DISCONTINUED | OUTPATIENT
Start: 2021-11-07 | End: 2021-11-07 | Stop reason: HOSPADM

## 2021-11-07 RX ORDER — METHYLERGONOVINE MALEATE 0.2 MG/ML
200 INJECTION INTRAVENOUS
Status: DISCONTINUED | OUTPATIENT
Start: 2021-11-07 | End: 2021-11-09 | Stop reason: HOSPADM

## 2021-11-07 RX ORDER — IBUPROFEN 800 MG/1
800 TABLET, FILM COATED ORAL EVERY 6 HOURS
Status: DISCONTINUED | OUTPATIENT
Start: 2021-11-09 | End: 2021-11-09 | Stop reason: HOSPADM

## 2021-11-07 RX ORDER — KETOROLAC TROMETHAMINE 30 MG/ML
30 INJECTION, SOLUTION INTRAMUSCULAR; INTRAVENOUS EVERY 6 HOURS
Status: DISPENSED | OUTPATIENT
Start: 2021-11-08 | End: 2021-11-08

## 2021-11-07 RX ORDER — BISACODYL 10 MG
10 SUPPOSITORY, RECTAL RECTAL DAILY PRN
Status: DISCONTINUED | OUTPATIENT
Start: 2021-11-09 | End: 2021-11-09 | Stop reason: HOSPADM

## 2021-11-07 RX ORDER — OXYTOCIN/0.9 % SODIUM CHLORIDE 30/500 ML
PLASTIC BAG, INJECTION (ML) INTRAVENOUS PRN
Status: DISCONTINUED | OUTPATIENT
Start: 2021-11-07 | End: 2021-11-07

## 2021-11-07 RX ORDER — BUPIVACAINE HYDROCHLORIDE 7.5 MG/ML
INJECTION, SOLUTION INTRASPINAL
Status: COMPLETED | OUTPATIENT
Start: 2021-11-07 | End: 2021-11-07

## 2021-11-07 RX ORDER — FENTANYL CITRATE-0.9 % NACL/PF 10 MCG/ML
PLASTIC BAG, INJECTION (ML) INTRAVENOUS CONTINUOUS PRN
Status: DISCONTINUED | OUTPATIENT
Start: 2021-11-07 | End: 2021-11-07

## 2021-11-07 RX ORDER — CARBOPROST TROMETHAMINE 250 UG/ML
250 INJECTION, SOLUTION INTRAMUSCULAR
Status: DISCONTINUED | OUTPATIENT
Start: 2021-11-07 | End: 2021-11-09 | Stop reason: HOSPADM

## 2021-11-07 RX ORDER — FENTANYL CITRATE 50 UG/ML
25 INJECTION, SOLUTION INTRAMUSCULAR; INTRAVENOUS EVERY 5 MIN PRN
Status: DISCONTINUED | OUTPATIENT
Start: 2021-11-07 | End: 2021-11-08 | Stop reason: HOSPADM

## 2021-11-07 RX ORDER — NALBUPHINE HYDROCHLORIDE 10 MG/ML
INJECTION, SOLUTION INTRAMUSCULAR; INTRAVENOUS; SUBCUTANEOUS
Status: COMPLETED
Start: 2021-11-07 | End: 2021-11-07

## 2021-11-07 RX ORDER — HYDROCORTISONE 2.5 %
CREAM (GRAM) TOPICAL 3 TIMES DAILY PRN
Status: DISCONTINUED | OUTPATIENT
Start: 2021-11-07 | End: 2021-11-09 | Stop reason: HOSPADM

## 2021-11-07 RX ORDER — ONDANSETRON 2 MG/ML
4 INJECTION INTRAMUSCULAR; INTRAVENOUS EVERY 30 MIN PRN
Status: DISCONTINUED | OUTPATIENT
Start: 2021-11-07 | End: 2021-11-07

## 2021-11-07 RX ORDER — SODIUM CHLORIDE, SODIUM LACTATE, POTASSIUM CHLORIDE, CALCIUM CHLORIDE 600; 310; 30; 20 MG/100ML; MG/100ML; MG/100ML; MG/100ML
INJECTION, SOLUTION INTRAVENOUS CONTINUOUS
Status: DISCONTINUED | OUTPATIENT
Start: 2021-11-07 | End: 2021-11-08 | Stop reason: HOSPADM

## 2021-11-07 RX ORDER — NICOTINE POLACRILEX 4 MG
15-30 LOZENGE BUCCAL
Status: DISCONTINUED | OUTPATIENT
Start: 2021-11-07 | End: 2021-11-09 | Stop reason: HOSPADM

## 2021-11-07 RX ORDER — NALBUPHINE HYDROCHLORIDE 10 MG/ML
2.5-5 INJECTION, SOLUTION INTRAMUSCULAR; INTRAVENOUS; SUBCUTANEOUS EVERY 6 HOURS PRN
Status: DISCONTINUED | OUTPATIENT
Start: 2021-11-07 | End: 2021-11-07

## 2021-11-07 RX ADMIN — PHENYLEPHRINE HYDROCHLORIDE 100 MCG: 10 INJECTION INTRAVENOUS at 20:40

## 2021-11-07 RX ADMIN — NALBUPHINE HYDROCHLORIDE 2.5 MG: 10 INJECTION, SOLUTION INTRAMUSCULAR; INTRAVENOUS; SUBCUTANEOUS at 23:07

## 2021-11-07 RX ADMIN — CEFAZOLIN SODIUM 2 G: 2 INJECTION, SOLUTION INTRAVENOUS at 20:43

## 2021-11-07 RX ADMIN — ACETAMINOPHEN 975 MG: 325 TABLET, FILM COATED ORAL at 20:12

## 2021-11-07 RX ADMIN — Medication 30 MCG/MIN: at 20:43

## 2021-11-07 RX ADMIN — BUPIVACAINE HYDROCHLORIDE IN DEXTROSE 10.5 MG: 7.5 INJECTION, SOLUTION SUBARACHNOID at 20:43

## 2021-11-07 RX ADMIN — SODIUM CHLORIDE, POTASSIUM CHLORIDE, SODIUM LACTATE AND CALCIUM CHLORIDE: 600; 310; 30; 20 INJECTION, SOLUTION INTRAVENOUS at 21:48

## 2021-11-07 RX ADMIN — SODIUM CHLORIDE, POTASSIUM CHLORIDE, SODIUM LACTATE AND CALCIUM CHLORIDE 1000 ML: 600; 310; 30; 20 INJECTION, SOLUTION INTRAVENOUS at 19:07

## 2021-11-07 RX ADMIN — BUPIVACAINE HYDROCHLORIDE 1.4 ML: 7.5 INJECTION, SOLUTION INTRASPINAL at 20:43

## 2021-11-07 RX ADMIN — NALBUPHINE HYDROCHLORIDE 2.5 MG: 10 INJECTION INTRAMUSCULAR; INTRAVENOUS; SUBCUTANEOUS at 23:07

## 2021-11-07 RX ADMIN — EPHEDRINE SULFATE 5 MG: 50 INJECTION, SOLUTION INTRAVENOUS at 20:40

## 2021-11-07 RX ADMIN — Medication 0.3 MG: at 20:43

## 2021-11-07 RX ADMIN — KETOROLAC TROMETHAMINE 30 MG: 30 INJECTION, SOLUTION INTRAMUSCULAR at 21:36

## 2021-11-07 RX ADMIN — SODIUM CHLORIDE, POTASSIUM CHLORIDE, SODIUM LACTATE AND CALCIUM CHLORIDE: 600; 310; 30; 20 INJECTION, SOLUTION INTRAVENOUS at 19:41

## 2021-11-07 RX ADMIN — ONDANSETRON 4 MG: 2 INJECTION INTRAMUSCULAR; INTRAVENOUS at 20:37

## 2021-11-07 RX ADMIN — AZITHROMYCIN MONOHYDRATE 500 MG: 500 INJECTION, POWDER, LYOPHILIZED, FOR SOLUTION INTRAVENOUS at 20:18

## 2021-11-07 RX ADMIN — MORPHINE SULFATE 0.3 MG: 1 INJECTION, SOLUTION EPIDURAL; INTRATHECAL; INTRAVENOUS at 20:43

## 2021-11-07 RX ADMIN — SODIUM CHLORIDE, SODIUM LACTATE, POTASSIUM CHLORIDE, CALCIUM CHLORIDE AND DEXTROSE MONOHYDRATE: 5; 600; 310; 30; 20 INJECTION, SOLUTION INTRAVENOUS at 22:55

## 2021-11-07 RX ADMIN — OXYTOCIN-SODIUM CHLORIDE 0.9% IV SOLN 30 UNIT/500ML 150 ML: 30-0.9/5 SOLUTION at 21:13

## 2021-11-07 RX ADMIN — SODIUM CITRATE AND CITRIC ACID MONOHYDRATE 30 ML: 500; 334 SOLUTION ORAL at 20:35

## 2021-11-07 RX ADMIN — SODIUM CHLORIDE, POTASSIUM CHLORIDE, SODIUM LACTATE AND CALCIUM CHLORIDE: 600; 310; 30; 20 INJECTION, SOLUTION INTRAVENOUS at 20:13

## 2021-11-07 ASSESSMENT — ACTIVITIES OF DAILY LIVING (ADL): ADLS_ACUITY_SCORE: 3

## 2021-11-07 ASSESSMENT — MIFFLIN-ST. JEOR: SCORE: 1543.18

## 2021-11-07 NOTE — TELEPHONE ENCOUNTER
"OB Triage Call      Is patient's OB/Midwife with the [facility] or [facility]? [facility]- Proceed with triage     Reason for call: rupture of membrane occurred 10 min ago     Assessment: needs to be seen in L&D    Plan: [facility] L&D    Patient plans to deliver at [facility]     Patient's primary OB Provider is Dr. Leyva.      Per protocol recommendations Patient to be evaluated in L&D. Patient's primary OB is [facility].  Labor and delivery at [facility] ([phone]) notified of patient's pending arrival.  Report given to Pamela.    Is patient's delivering hospital on divert? No      35w3d    Estimated Date of Delivery: Dec 9, 2021        OB History    Para Term  AB Living   2 1 1 0 0 1   SAB IAB Ectopic Multiple Live Births   0 0 0 0 1      # Outcome Date GA Lbr Jeronimo/2nd Weight Sex Delivery Anes PTL Lv   2 Current            1 Term 19 41w0d 28:25 3.062 kg (6 lb 12 oz) M CS-LTranv EPI N JOSE      Complications: Failure to Progress in Second Stage, Fetal Intolerance      Name: Jules      Apgar1: 9  Apgar5: 9       Lab Results   Component Value Date    GBS Negative 2018          Sergey Riddle RN 21 5:05 PM  [facility] Nurse Advisor      Reason for Disposition    Leakage of fluid from vagina    Additional Information    Negative: [1] SEVERE abdominal pain (e.g., excruciating) AND [2] constant AND [3] present > 1 hour    Negative: Severe bleeding (e.g., continuous red blood from vagina, or large blood clots)    Negative: Umbilical cord hanging out of the vagina (shiny, white, curled appearance, \"like telephone cord\")    Negative: Uncontrollable urge to push (i.e., feels like baby is coming out now)    Negative: Can see baby    Negative: Sounds like a life-threatening emergency to the triager    Negative: < 20 weeks pregnant    Negative: Vaginal bleeding    Protocols used: PREGNANCY - RUPTURE OF LIUUAUMKA-D-EM      "

## 2021-11-08 LAB
GLUCOSE BLDC GLUCOMTR-MCNC: 87 MG/DL (ref 70–99)
HGB BLD-MCNC: 9.5 G/DL (ref 11.7–15.7)
T PALLIDUM AB SER QL: NONREACTIVE

## 2021-11-08 PROCEDURE — 36415 COLL VENOUS BLD VENIPUNCTURE: CPT | Performed by: FAMILY MEDICINE

## 2021-11-08 PROCEDURE — 250N000009 HC RX 250: Performed by: FAMILY MEDICINE

## 2021-11-08 PROCEDURE — 120N000001 HC R&B MED SURG/OB

## 2021-11-08 PROCEDURE — 0UC97ZZ EXTIRPATION OF MATTER FROM UTERUS, VIA NATURAL OR ARTIFICIAL OPENING: ICD-10-PCS | Performed by: FAMILY MEDICINE

## 2021-11-08 PROCEDURE — 250N000011 HC RX IP 250 OP 636: Performed by: ANESTHESIOLOGY

## 2021-11-08 PROCEDURE — 250N000011 HC RX IP 250 OP 636

## 2021-11-08 PROCEDURE — 250N000013 HC RX MED GY IP 250 OP 250 PS 637: Performed by: FAMILY MEDICINE

## 2021-11-08 PROCEDURE — 999N000080 HC STATISTIC IP LACTATION SERVICES 16-30 MIN

## 2021-11-08 PROCEDURE — 85018 HEMOGLOBIN: CPT | Performed by: FAMILY MEDICINE

## 2021-11-08 PROCEDURE — 250N000011 HC RX IP 250 OP 636: Performed by: FAMILY MEDICINE

## 2021-11-08 RX ORDER — NALOXONE HYDROCHLORIDE 0.4 MG/ML
0.4 INJECTION, SOLUTION INTRAMUSCULAR; INTRAVENOUS; SUBCUTANEOUS
Status: DISCONTINUED | OUTPATIENT
Start: 2021-11-08 | End: 2021-11-09 | Stop reason: HOSPADM

## 2021-11-08 RX ORDER — NALBUPHINE HYDROCHLORIDE 10 MG/ML
2.5-5 INJECTION, SOLUTION INTRAMUSCULAR; INTRAVENOUS; SUBCUTANEOUS EVERY 6 HOURS PRN
Status: CANCELLED | OUTPATIENT
Start: 2021-11-08 | End: 2021-11-10

## 2021-11-08 RX ORDER — FENTANYL CITRATE 50 UG/ML
50-100 INJECTION, SOLUTION INTRAMUSCULAR; INTRAVENOUS
Status: DISCONTINUED | OUTPATIENT
Start: 2021-11-08 | End: 2021-11-09 | Stop reason: HOSPADM

## 2021-11-08 RX ORDER — NALOXONE HYDROCHLORIDE 0.4 MG/ML
0.2 INJECTION, SOLUTION INTRAMUSCULAR; INTRAVENOUS; SUBCUTANEOUS
Status: DISCONTINUED | OUTPATIENT
Start: 2021-11-08 | End: 2021-11-09 | Stop reason: HOSPADM

## 2021-11-08 RX ORDER — NALBUPHINE HYDROCHLORIDE 10 MG/ML
2.5 INJECTION, SOLUTION INTRAMUSCULAR; INTRAVENOUS; SUBCUTANEOUS
Status: DISPENSED | OUTPATIENT
Start: 2021-11-08 | End: 2021-11-09

## 2021-11-08 RX ORDER — FENTANYL CITRATE 50 UG/ML
INJECTION, SOLUTION INTRAMUSCULAR; INTRAVENOUS
Status: COMPLETED
Start: 2021-11-08 | End: 2021-11-08

## 2021-11-08 RX ADMIN — ACETAMINOPHEN 975 MG: 325 TABLET, FILM COATED ORAL at 14:58

## 2021-11-08 RX ADMIN — ACETAMINOPHEN 975 MG: 325 TABLET, FILM COATED ORAL at 02:10

## 2021-11-08 RX ADMIN — MISOPROSTOL 400 MCG: 200 TABLET ORAL at 01:44

## 2021-11-08 RX ADMIN — KETOROLAC TROMETHAMINE 30 MG: 30 INJECTION, SOLUTION INTRAMUSCULAR at 10:42

## 2021-11-08 RX ADMIN — FENTANYL CITRATE 50 MCG: 50 INJECTION, SOLUTION INTRAMUSCULAR; INTRAVENOUS at 02:17

## 2021-11-08 RX ADMIN — Medication 100 ML/HR: at 01:41

## 2021-11-08 RX ADMIN — DOCUSATE SODIUM 50 MG AND SENNOSIDES 8.6 MG 1 TABLET: 8.6; 5 TABLET, FILM COATED ORAL at 20:56

## 2021-11-08 RX ADMIN — NALBUPHINE HYDROCHLORIDE 2.5 MG: 10 INJECTION, SOLUTION INTRAMUSCULAR; INTRAVENOUS; SUBCUTANEOUS at 15:06

## 2021-11-08 RX ADMIN — ACETAMINOPHEN 975 MG: 325 TABLET, FILM COATED ORAL at 20:55

## 2021-11-08 RX ADMIN — KETOROLAC TROMETHAMINE 30 MG: 30 INJECTION, SOLUTION INTRAMUSCULAR at 18:32

## 2021-11-08 RX ADMIN — SENNOSIDES AND DOCUSATE SODIUM 2 TABLET: 50; 8.6 TABLET ORAL at 08:46

## 2021-11-08 RX ADMIN — METHYLERGONOVINE MALEATE 200 MCG: 0.2 INJECTION, SOLUTION INTRAMUSCULAR; INTRAVENOUS at 02:05

## 2021-11-08 RX ADMIN — SODIUM CHLORIDE, SODIUM LACTATE, POTASSIUM CHLORIDE, CALCIUM CHLORIDE AND DEXTROSE MONOHYDRATE: 5; 600; 310; 30; 20 INJECTION, SOLUTION INTRAVENOUS at 07:05

## 2021-11-08 RX ADMIN — ACETAMINOPHEN 975 MG: 325 TABLET, FILM COATED ORAL at 08:46

## 2021-11-08 RX ADMIN — NALBUPHINE HYDROCHLORIDE 2.5 MG: 10 INJECTION, SOLUTION INTRAMUSCULAR; INTRAVENOUS; SUBCUTANEOUS at 06:39

## 2021-11-08 ASSESSMENT — ACTIVITIES OF DAILY LIVING (ADL)
ADLS_ACUITY_SCORE: 3
ADLS_ACUITY_SCORE: 5
WEAR_GLASSES_OR_BLIND: NO
FALL_HISTORY_WITHIN_LAST_SIX_MONTHS: NO
ADLS_ACUITY_SCORE: 3
DIFFICULTY_EATING/SWALLOWING: NO
WALKING_OR_CLIMBING_STAIRS_DIFFICULTY: NO
ADLS_ACUITY_SCORE: 5
ADLS_ACUITY_SCORE: 3
ADLS_ACUITY_SCORE: 5
ADLS_ACUITY_SCORE: 5
ADLS_ACUITY_SCORE: 3
ADLS_ACUITY_SCORE: 5
WEAR_GLASSES_OR_BLIND: NO
ADLS_ACUITY_SCORE: 5
ADLS_ACUITY_SCORE: 3
ADLS_ACUITY_SCORE: 3
TOILETING_ISSUES: NO
ADLS_ACUITY_SCORE: 5
PATIENT_/_FAMILY_COMMUNICATION_STYLE: SPOKEN LANGUAGE (ENGLISH OR BILINGUAL)
ADLS_ACUITY_SCORE: 5
DIFFICULTY_COMMUNICATING: NO
CONCENTRATING,_REMEMBERING_OR_MAKING_DECISIONS_DIFFICULTY: NO
ADLS_ACUITY_SCORE: 3
ADLS_ACUITY_SCORE: 3
ADLS_ACUITY_SCORE: 5
DRESSING/BATHING_DIFFICULTY: NO
ADLS_ACUITY_SCORE: 3
ADLS_ACUITY_SCORE: 3
ADLS_ACUITY_SCORE: 5
HEARING_DIFFICULTY_OR_DEAF: NO
ADLS_ACUITY_SCORE: 5
ADLS_ACUITY_SCORE: 5
DOING_ERRANDS_INDEPENDENTLY_DIFFICULTY: NO

## 2021-11-08 NOTE — ANESTHESIA PREPROCEDURE EVALUATION
Anesthesia Pre-Procedure Evaluation    Patient: Vianca Valle   MRN: [id number] : 1985        Preoperative Diagnosis: Twin birth delivered by  section in hospital [Z38.31]    Procedure : Procedure(s):   SECTION          Past Medical History:   Diagnosis Date     Allergic rhinitis      Depressive disorder      Diabetes (H)     GDM diet     Hematuria 2017     Ruptured ear drum, right 2016     Urinary tract infection      Varicella      Venereal warts       Past Surgical History:   Procedure Laterality Date      SECTION N/A 2019    Procedure:  SECTION;  Surgeon: Dotty España MD;  Location:  L+D     GYN SURGERY       TONSILLECTOMY      2002      No Known Allergies   Social History     Tobacco Use     Smoking status: Former Smoker     Packs/day: 0.50     Years: 10.00     Pack years: 5.00     Types: Cigarettes     Start date: 2006     Smokeless tobacco: Never Used     Tobacco comment: 2018   Substance Use Topics     Alcohol use: No     Alcohol/week: 1.0 - 3.0 standard drink      Wt Readings from Last 1 Encounters:   21 88.9 kg (196 lb)        Anesthesia Evaluation   Pt has had prior anesthetic. Type: General.    No history of anesthetic complications       ROS/MED HX  ENT/Pulmonary:  - neg pulmonary ROS     Neurologic:  - neg neurologic ROS     Cardiovascular:  - neg cardiovascular ROS     METS/Exercise Tolerance:     Hematologic:  - neg hematologic  ROS     Musculoskeletal:  - neg musculoskeletal ROS     GI/Hepatic:  - neg GI/hepatic ROS     Renal/Genitourinary:  - neg Renal ROS     Endo: Comment: Gestational diabetes      Psychiatric/Substance Use:  - neg psychiatric ROS     Infectious Disease:  - neg infectious disease ROS     Malignancy:  - neg malignancy ROS     Other:      (+) Possibly pregnant, ,         Physical Exam    Airway        Mallampati: I   TM distance: > 3 FB   Neck ROM: full   Mouth opening: > 3 cm    Respiratory Devices and  Support         Dental  no notable dental history         Cardiovascular   cardiovascular exam normal          Pulmonary   pulmonary exam normal                OUTSIDE LABS:  CBC:   Lab Results   Component Value Date    WBC 13.8 (H) 09/15/2021    WBC 11.5 (H) 05/12/2021    HGB 12.6 11/07/2021    HGB 11.5 (L) 09/15/2021    HCT 35.1 09/15/2021    HCT 39.7 05/12/2021     09/15/2021     05/12/2021     BMP:   Lab Results   Component Value Date     10/24/2013    POTASSIUM 4.0 10/24/2013    CHLORIDE 107 10/24/2013    CO2 28 10/24/2013    BUN 13 10/24/2013    CR 0.78 10/24/2013    GLC 71 11/07/2021    GLC 89 10/24/2013     COAGS: No results found for: PTT, INR, FIBR  POC: No results found for: BGM, HCG, HCGS  HEPATIC: No results found for: ALBUMIN, PROTTOTAL, ALT, AST, GGT, ALKPHOS, BILITOTAL, BILIDIRECT, SUZIE  OTHER:   Lab Results   Component Value Date    A1C 5.5 08/18/2017    TONI 9.0 10/24/2013    TSH 1.30 12/31/2014       Anesthesia Plan    ASA Status:  2   NPO Status:  NPO Appropriate    Anesthesia Type: Spinal.              Consents    Anesthesia Plan(s) and associated risks, benefits, and realistic alternatives discussed. Questions answered and patient/representative(s) expressed understanding.     - Discussed with:  Patient      - Extended Intubation/Ventilatory Support Discussed: No.      - Patient is DNR/DNI Status: No         Postoperative Care    Pain management: IV analgesics, Oral pain medications, Neuraxial analgesia, Multi-modal analgesia.   PONV prophylaxis: Ondansetron (or other 5HT-3), Dexamethasone or Solumedrol     Comments:                Ricardo Frias MD

## 2021-11-08 NOTE — ANESTHESIA PROCEDURE NOTES
Intrathecal injection Procedure Note    Pre-Procedure   Staff -        Anesthesiologist:  Ricardo Frias MD       Performed By: anesthesiologist       Location: OB       Pre-Anesthestic Checklist: patient identified, IV checked, risks and benefits discussed, informed consent, monitors and equipment checked and pre-op evaluation  Timeout:       Correct Patient: Yes        Correct Procedure: Yes        Correct Site: Yes        Correct Position: Yes   Procedure Documentation  Procedure: intrathecal injection       Patient Position: sitting       Patient Prep/Sterile Barriers: sterile gloves, mask       Skin prep: Betadine       Insertion Site: L2-3. (midline approach).       Needle Gauge: 24.        Needle Length (Inches): 4        Spinal Needle Type: SprotteNo introducer used      # of attempts: 1 and  # of redirects:     Assessment/Narrative         Paresthesias: No.    Medication(s) Administered   0.75% Hyperbaric Bupivacaine (Intrathecal), 1.4 mL  Morphine PF 1 mg/mL (Intrathecal), 0.3 mg  Medication Administration Time: 11/7/2021 8:43 PM     Comments:  Aguilar PENCAN Spinal Needle Tray  Lot:  [id number]  Expiration:  2023-02-28

## 2021-11-08 NOTE — OP NOTE
PREOPERATIVE DIAGNOSIS: A 36 year old-year-old  at 35w3d weeks estimated gestational age admitted for PPROM with twin gestation, history of  section, IUGR, GDMA2, Twin 1 with club foot.  POSTOPERATIVE DIAGNOSES: A 36 year old-year-old  at 35w3d weeks estimated gestational age admitted for PPROM with twin gestation, history of  section, IUGR, GDMA2, Twin 1 with club foot.  PROCEDURE:   1. Repeat low transverse  section.   2. Seprafilm placement for adhesiolysis.   COMPLICATIONS: None apparent at time of procedure.   ESTIMATED BLOOD LOSS: 337  mL.   SURGEON: Edda Mcneal DO.   INDICATIONS: A 36 year old-year-old  at 35w3d weeks estimated gestational age admitted for PPROM with twin gestation, history of  section, IUGR, GDMA2, Twin 1 with club foot.Patient signed consent for repeat  section, risks benefits, alternatives are discussed with the patient.    OUTCOME: Twin 1 male infant weight pending apgars 9 at 1 minute and 10 at 5 minutes.   Twin 2 male infant weight pending apgars 8 at 1 minute and 9 at 5 minutes.   Findings:   Normal uterus, tubes and ovaries.   DETAILS OF PROCEDURE: After informed consent was signed, the patient was placed in dorsal supine position, spinal placed for anesthesia. Fetal heart tones were obtained and found to be in 150/150s. The patient was prepped and draped in normal sterile fashion and a time out was performed. Pre-operative antibiotics were given. Adequate anesthesia was reported per patient after the spinal was dosed to a surgical level.  A pfannensteil incision was then carried down to the underlying fascia and incised in the midline. The fascia is dissected off the rectus muscles superiorly and inferiorly. Blunt/sharp dissection of the peritoneum was performed and blunt stretching of the muscles was perfomed. The uterus was visualized.  An fernando retractor is placed. The peritoneum was visualized. The lower uterine  segment was incised with a scalpel. Blunt stretching was performed and the Twin 1 was delivered atraumatically via the normal breech maneuvers.  The nose and mouth were bulb suctioned. The cord was clamped and cut after 1 minute.  Infant was taken over to the waiting  staff. Twin 2 had a bbow and amniotomy was performed.  The baby was delivered atraumatically. The nose and mouth were bulb suctioned. The cord was clamped and cut after 1 minute.  Infant was taken over to the waiting  staff. The placenta was manually delivered. The uterus was cleared of all clots and debris. The placenta was examined and found to be complete.  The lower uterine segment was reapproximated with 0 Monocryl in a running locked fashion. A second layer of the same suture was used for imbrication. The bladder flap was recreated using 3-0 Monocryl. The fernando retractor is removed. Irrigation was performed. Seprafilm was placed over the bladder flap. Hemostasis was noted. The peritoneum was closed with 3-0 monocryl. The rectus muscles were reapproximated in the midline and closed with several horizontal mattress sutures of 3-0 monocryl.  A 3-0 monocryl stitch was placed on the fascia due to bleeding noted with a visible blood vessel.  Hemostasis was assured with Bovie cautery on the rectus muscles, and Seprafilm was placed over it. The fascia was reapproximated with 0 looped PDS in a running fashion with good reapproximation. A subcuticular stitch using 4-0 Monocryl was used to reapproximate the skin. Steri-Strips, telfa, and tegaderm was applied. The patient tolerated the procedure well. Sponge, lap and needle counts were correct x2.   JUAREZ VELAZQUEZ DO

## 2021-11-08 NOTE — PLAN OF CARE
Discussed the importance of  establishing breast pumping with patient within first (preferred) to six hours of delivery.   Patient would like to wait until she has been to see her babies and is settled in her pp room to start.  Sejal Cooney RN on 11/7/2021 at 11:23 PM

## 2021-11-08 NOTE — ANESTHESIA CARE TRANSFER NOTE
Patient: Vianca Valle    Procedure: Procedure(s):   SECTION       Diagnosis: Twin birth delivered by  section in hospital [Z38.31]  Diagnosis Additional Information: No value filed.    Anesthesia Type:   Spinal     Note:    Oropharynx: oropharynx clear of all foreign objects and spontaneously breathing  Level of Consciousness: awake  Oxygen Supplementation: room air    Independent Airway: airway patency satisfactory and stable  Dentition: dentition unchanged  Vital Signs Stable: post-procedure vital signs reviewed and stable  Report to RN Given: handoff report given  Patient transferred to: Labor and Delivery  Comments: No issues   Handoff Report: Identifed the Patient, Identified the Reponsible Provider, Reviewed the pertinent medical history, Discussed the surgical course, Reviewed Intra-OP anesthesia mangement and issues during anesthesia and Allowed opportunity for questions and acknowledgement of understanding      Vitals:  Vitals Value Taken Time   BP     Temp     Pulse     Resp     SpO2         Electronically Signed By: AMANDA Delatorre CRNA  2021  9:59 PM

## 2021-11-08 NOTE — PLAN OF CARE
Patient vitally stable, yen catheter in place, tolerating regular diet, ambulating with assistance. IV has been saline locked. Fundal checks WDL. Lochia is scant, no clots. Pain adequately managed with Tylenol and Ibuprofen. Pt denies breast soreness or engorgement. Lactation consultant worked extensively with couplet today - see note. Patient plans to pump and visit twins in NICU to attempt at breast feeding later today.  present and supportive. Both parents are attentive to infant.    Patients mobililty level scored using the bedside mobility assistance tool (BMAT). Patient is at a mobility level test number: 3. Mobility equipment used: none required. Required standby assist of 1 staff members. Further use of BMAT scoring required.

## 2021-11-08 NOTE — LACTATION NOTE
Lactation in to see patient. Twins down in NICU Late . Discussed feeding behaviors of LPT babies. Started Vianca pumping. Education around pumping and cleaning parts done. Made hands free pumping top for patient to use. Encouraged Vianca to hand express after pumping to get more volume to take down to her son and daughter. Encouraged patient to do skin to skin when infants are stable. Baby boy ready to breastfeed, parents know lactation able to assist with latch this shift or whenever patient ready to try to breastfeed. All questions answered at this time.

## 2021-11-08 NOTE — PROVIDER NOTIFICATION
11/08/21 0212   Provider Notification   Provider Name/Title Dr. Mcneal   Method of Notification Phone   Request Evaluate - Remote   Notification Reason Status Update   Discuss with MD IM methergine given about 5 minutes ago. Fundus is still boggy, firms quickly with massage - flow light. Most recent weighed blood loss is 296ml. Requesting pain meds.    MD orders IV Fentanyl prn. Notify MD if bleeding doesn't resolve post IM methergine dose.

## 2021-11-08 NOTE — PROGRESS NOTES
[facility] Post-Op / Progress Note         Assessment and Plan:    Assessment:   Post-operative day #1  Low transverse repeat  section  L&D complications: A 36 year old-year-old  at 35w3d weeks estimated gestational age admitted for PPROM with twin gestation, history of  section, IUGR, GDMA2, Twin 1 with club foot.      Doing well.  Clean wound without signs of infection.  Normal healing wound.  No immediate surgical complications identified.  No excessive bleeding  Pain well-controlled.      Plan:   Ambulation encouraged  Breast feeding strategies discussed  Monitor wound for signs of infection  Pain control measures as needed  Reportable signs and symptoms dicussed with the patient  Anticipate discharge in 1-2 days           Interval History:   Doing well.  Pain is well-controlled.  No fevers.  No history of wound drainage, warmth or significant erythema.  Good appetite.  Denies chest pain, shortness of breath, nausea or vomiting.  Ambulatory.  Patient pumping          Significant Problems:      Past Medical History:   Diagnosis Date     Allergic rhinitis      Depressive disorder      Diabetes (H)     GDM diet     Hematuria 2017     Ruptured ear drum, right 2016     Urinary tract infection      Varicella      Venereal warts              Review of Systems:    The patient denies any chest pain, shortness of breath, excessive pain, fever, chills, purulent drainage from the wound, nausea or vomiting.          Medications:     All medications related to the patient's surgery have been reviewed  Current Facility-Administered Medications   Medication     acetaminophen (TYLENOL) tablet 975 mg     [START ON 2021] bisacodyl (DULCOLAX) Suppository 10 mg     carboprost (HEMABATE) injection 250 mcg     dextrose 5% in lactated ringers infusion     glucose gel 15-30 g    Or     dextrose 50 % injection 25-50 mL    Or     glucagon injection 1 mg     fentaNYL (PF) (SUBLIMAZE)  injection  mcg     hydrocortisone 2.5 % cream     ibuprofen (ADVIL/MOTRIN) tablet 800 mg     ketorolac (TORADOL) injection 30 mg     lanolin cream     lidocaine (LMX4) cream     lidocaine 1 % 0.1-1 mL     methylergonovine (METHERGINE) injection 200 mcg     metoclopramide (REGLAN) injection 10 mg    Or     metoclopramide (REGLAN) tablet 10 mg     misoprostol (CYTOTEC) tablet 400 mcg    Or     misoprostol (CYTOTEC) tablet 800 mcg     nalbuphine (NUBAIN) injection 2.5 mg     naloxone (NARCAN) injection 0.2 mg    Or     naloxone (NARCAN) injection 0.4 mg    Or     naloxone (NARCAN) injection 0.2 mg    Or     naloxone (NARCAN) injection 0.4 mg     No MMR Needed -  Assessment: Patient does not need MMR vaccine     ondansetron (ZOFRAN-ODT) ODT tab 4 mg    Or     ondansetron (ZOFRAN) injection 4 mg     oxyCODONE (ROXICODONE) tablet 5 mg     oxytocin (PITOCIN) injection 10 Units     prochlorperazine (COMPAZINE) injection 10 mg    Or     prochlorperazine (COMPAZINE) tablet 10 mg    Or     prochlorperazine (COMPAZINE) suppository 25 mg     senna-docusate (SENOKOT-S/PERICOLACE) 8.6-50 MG per tablet 1 tablet    Or     senna-docusate (SENOKOT-S/PERICOLACE) 8.6-50 MG per tablet 2 tablet     simethicone (MYLICON) chewable tablet 80 mg     sodium chloride (PF) 0.9% PF flush 3 mL     sodium chloride (PF) 0.9% PF flush 3 mL     [START ON 11/9/2021] sodium phosphate (FLEET ENEMA) 1 enema     Tdap (tetanus-diphtheria-acell pertussis) (ADACEL) injection 0.5 mL     tranexamic acid 1 g in 100 mL NS IV bag (premix)             Physical Exam:   Vitals were reviewed  All vitals stable  Temp: 97.8  F (36.6  C) Temp src: Oral BP: 113/74 Pulse: 80   Resp: 16 SpO2: 99 %      Wound clean and dry with minimal or no drainage.  Surrounding skin with minimal erythema.          Data:     All laboratory data related to this surgery reviewed  Hemoglobin   Date Value Ref Range Status   11/08/2021 9.5 (L) 11.7 - 15.7 g/dL Final   11/07/2021 12.6 11.7  - 15.7 g/dL Final   09/15/2021 11.5 (L) 11.7 - 15.7 g/dL Final   05/12/2021 13.2 11.7 - 15.7 g/dL Final   01/21/2019 10.9 (L) 11.7 - 15.7 g/dL Final   01/20/2019 13.6 11.7 - 15.7 g/dL Final   12/19/2018 12.0 11.7 - 15.7 g/dL Final   10/18/2018 11.9 11.7 - 15.7 g/dL Final     No imaging studies have been ordered    Burton Bell MD

## 2021-11-08 NOTE — PROGRESS NOTES
"S:  Called for post partum hemorrhage     O: /68   Pulse 106   Temp 97.5  F (36.4  C) (Oral)   Resp 16   Ht 1.6 m (5' 3\")   Wt 88.9 kg (196 lb)   LMP 2021 (Exact Date)   SpO2 100%   Breastfeeding Unknown   BMI 34.72 kg/m       GENERAL healthy, alert and no distress  GYN PELVIC: NEGATIVE, normal external genitalia,  Large clot expressed, uterus firm, cervix 1 cm   Large clot was on pad when I came in room   Total  ml     2nd bag of pitocin, 400 mcg cytotec po given   Issa catheter placed.     Assessment:  36 year old y/o  presents with following issues:      1. Post partum hemorrhage:   Stable.  Clots expressed with resolution of bleeding.   Issa now in place.  Hemoglobin pending in am, previously ordered.      Dr. Edda Mcneal, DO    Obstetrics and Gynecology  [facility] [place] [facility] [place]             "

## 2021-11-08 NOTE — L&D DELIVERY NOTE
Delivery Summary    Vianca Valle MRN# [id number]   Age: 36 year old YOB: 1985     ASSESSMENT & PLAN:  section for twin gestation          Gilmar Valle [[id number]]    Labor Event Times    Labor onset date: 21 Onset time:  4:45 PM      Labor Events     labor?: Yes   steroids: None  Labor Type: Other  Predominate monitoring during 1st stage: continuous electronic fetal monitoring     Rupture date/time: 21 1645   Rupture type: Spontaneous rupture of membranes occuring during spontaneous labor or augmentation  Fluid color: Clear     1:1 continuous labor support provided by?: RN       Delivery/Placenta Date and Time    Delivery Date: 21 Delivery Time:  9:06 PM   Placenta Date/Time: 2021  9:10 PM  Delivering clinician: Edda Mcneal,           Apgars     1 Minute 5 Minute 10 Minute 15 Minute 20 Minute   Skin color: 1  2       Heart rate: 2  2       Reflex irritability: 2  2       Muscle tone: 2  2       Respiratory effort: 2  2       Total: 9  10       Apgars assigned by: RADHA PATEL,CNP     Cord    Cord Complications: None                  [place] Resuscitation    Methods: Oximetry, Temp Skin Control   Care at Delivery: Called by Dr Lynn for repeat  of di-di twins PPROM at 35w3d.  Twin A delivered to maternal abdomen with delayed cord clamping x1 min.  Was crying with good tone.  AFter cord clamping infant brought to radiant warmer, continued drying stim and warmth, bulb suctioned for clear mucous.  Infant was placed on oximeter at 2 minutes of age with sats >90 and continued to 100%.  Appears SGA.  Due to birth weight <2kg infant is brought to NICU for routine care of the late  infant.  Infant wrapped in warm blankest and show to mother.  Transferred to NICU with FOB in attendance. AMANDA Mason CNP   2021 , 9:38 PM.         Delivery (Maternal) (Provider to Complete) ([id number])     Episiotomy: None  Perineal lacerations: None       Blood Loss  Mother: Vianca Valle #[id number]   Start of Mother's Information    Delivery Blood Loss  21 1645 - 21 2158    Total Surgical QBL Blood Loss (mL) Hospital Encounter 337 mL    Total  337 mL         End of Mother's Information  Mother: Vianca Valle #[id number]          Delivery - Provider to Complete ([id number])    Delivering clinician: Edda Mcneal DO  Delivery Type (Choose the 1 that will go to the Birth History): , Low Transverse                    Priority: ASAP    Specifics: Repeat   Indications for Repeat: Labor/SROM/Planned Repeat                 Placenta    Date/Time: 2021  9:10 PM  Removal: Manual Removal  Disposition: Pathology           Anesthesia    Method: Spinal                Presentation and Position    Presentation: Breech    Position: Left Sacrum Anterior            Brandi Valle-B Vianca [[id number]]    Labor Events     labor?: Yes     Rupture date/time: 21   Rupture type: Spontaneous rupture of membranes occuring during spontaneous labor or augmentation  Fluid color: Clear     Delivery/Placenta Date and Time    Delivery Date: 21 Delivery Time:  9:10 PM   Placenta Date/Time: 2021  9:10 PM  Oxytocin given at the time of delivery: after delivery of baby   Other personnel present at delivery:  Provider Role   Edda Mcneal DO          Apgars    Living status: Living   1 Minute 5 Minute 10 Minute 15 Minute 20 Minute   Skin color: 0  1       Heart rate: 2  2       Reflex irritability: 2  2       Muscle tone: 2  2       Respiratory effort: 2  2       Total: 8  9       Apgars assigned by: RADHA PATEL,CNP     Cord    Cord Complications: None               Stem cell collection?: No       [place] Resuscitation    Methods: Suctioning, Oximetry, Temp Skin Control  [place] Care at Delivery: Called by Dr. Lynn for 35w3d, di-di twins, PPROM,  repeat .  Twin B delivered to maternal abdomen, delayed cord clamping, with infant cry and tone.  Placed under radiant warmer, with drying and warmth, stimulated to cry.  Color improved over 3-5 minutes.  Sat >90 at 7 minutes and remained so in room air.  Due to birthweight <2kg, infant will be admitted to NICU for further monitoring.  Infant wrapped in warm blankets, shown to mother then transferred to NICU with FOB accompanying. AAMNDA Mason CNP   2021 , 9:42 PM.         Labor Events and Shoulder Dystocia    Fetal Tracing Prior to Delivery: Category 1  Shoulder dystocia present?: Neg     Delivery (Maternal) (Provider to Complete) ([id number])    Episiotomy: None  Perineal lacerations: None       Blood Loss  Mother: Vianca Valle #[id number]   Start of Mother's Information    Delivery Blood Loss  21 1645 - 21 2158    Total Surgical QBL Blood Loss (mL) Hospital Encounter 337 mL    Total  337 mL         End of Mother's Information  Mother: Vianca Valle #[id number]          Delivery - Provider to Complete ([id number])    Delivery Type (Choose the 1 that will go to the Birth History): , Low Transverse                    Priority: ASAP    Specifics: Repeat   Indications for Repeat: Labor/SROM/Planned Repeat   Other personnel:  Provider Role   Edda Mcneal DO                 Placenta    Date/Time: 2021  9:10 PM  Removal: Manual Removal  Disposition: Pathology           Presentation and Position    Presentation: Vertex    Position: Left Occiput Anterior                 Edda Mcneal DO

## 2021-11-08 NOTE — PLAN OF CARE
Patient resting most of the evening. Has initiated pumping, discussed pumping every 3 hours while awake. Pain controlled with medications. Plan to remove catheter this evening. Incision covered with tegaderm dressing, old, dried drainage present.

## 2021-11-08 NOTE — ADDENDUM NOTE
Addendum  created 11/08/21 0526 by Ricardo Frias MD    Order list changed, Order sets accessed

## 2021-11-08 NOTE — PLAN OF CARE
Data: Patient presented to [facility]: 2021  5:59 PM.  Reason for maternal/fetal assessment is leaking vaginal fluid. Patient reports gush of clear fluid at 1730.  Patient is a .  Prenatal record reviewed. Pregnancy  has been complicated by twin pregnancy and GDM, diet.  Gestational Age 35w3d. VSS. Fetal movement active. Patient denies uterine contractions, vaginal bleeding, abdominal pain, pelvic pressure, nausea, vomiting, headache, visual disturbances, epigastric or URQ pain, significant edema, pt only has back ache at this. Support person is not present.  is in parking lot waiting for family to come  2 year old son  Action: Verbal consent for EFM. Triage assessment completed. Bill of rights reviewed.  Response: Patient verbalized agreement with plan. Will contact Dr Edda Mcneal with update and for further orders.

## 2021-11-08 NOTE — PROVIDER NOTIFICATION
11/08/21 0137   Provider Notification   Provider Name/Title Dr. Mcneal    Method of Notification In Department   Request Evaluate in Person   Request MD at bedside d/t large clot. MD at L&D desk and will be at bedside shortly.

## 2021-11-08 NOTE — PROVIDER NOTIFICATION
11/08/21 0524   Provider Notification   Provider Name/Title Dr. Frias   Method of Notification Phone   Request Evaluate - Remote   Notification Reason Patient Request   Discuss with MDA that pt is requesting medication for itching.     MD ordering prn Nubain.

## 2021-11-08 NOTE — ANESTHESIA POSTPROCEDURE EVALUATION
Patient: Vianca Valle    Procedure: Procedure(s):   SECTION       Diagnosis:Twin birth delivered by  section in hospital [Z38.31]  Diagnosis Additional Information: No value filed.    Anesthesia Type:  Spinal    Note:     Postop Pain Control: Uneventful            Sign Out: Well controlled pain   PONV: No   Neuro/Psych: Uneventful            Sign Out: Acceptable/Baseline neuro status   Airway/Respiratory: Uneventful            Sign Out: Acceptable/Baseline resp. status   CV/Hemodynamics: Uneventful            Sign Out: Acceptable CV status   Other NRE: NONE   DID A NON-ROUTINE EVENT OCCUR? No           Last vitals:  Vitals Value Taken Time   /63 21   Temp 97.5  F (36.4  C) 21   Pulse 83 21   Resp 16 21   SpO2 99 % 21       Electronically Signed By: Ricardo Frias MD  2021  10:26 PM

## 2021-11-08 NOTE — PLAN OF CARE
Patient progressed through Phases of PACU without issue.  VSS.  Incision CDI, FF, scan to no bleeding.  Good urine output.  Issa catheter discontinued at 2 hours post op.  Patient has denied pain throughout PACU phases. Patient transported on cart to NICU to see babies.  Patient to be transported to her room in post partum.  Sejal Cooney RN on 11/8/2021 at 12:28 AM

## 2021-11-08 NOTE — PLAN OF CARE
VSS, uterus firm, bleeding now minimal. FC draining to urine bag; adequate output. IVF running. Had a couple of emesis after position change but no nausea noted after. Is tolerating regular diet. Pain well-controlled. BS at 0600H is 87. For Hgb check this AM.

## 2021-11-08 NOTE — PLAN OF CARE
Dr Edda Mcneal informed of patient arrival and assessment including the following:    Reason for maternal/fetal assessment leaking vaginal fluid, Twins, GDM diet, ate dinner at 1500. Pt reports SROM 1730 clear. Fetal status normal baseline, moderate variability, accelerations present and no decelerations for both Baby 1 And 2, difficulty keeping monitors on due to movement. Plan per provider/orders received for preop orders, planning on c/s at [id number], due to last time eating and waiting for pt's  to arrive from dropping off their 2 yr old. Pt only having back pain at this time but is jared.

## 2021-11-08 NOTE — H&P
[facility] Labor and Delivery History and Physical    Vianca Valle MRN# [id number]   Age: 36 year old YOB: 1985     Date of Admission:  2021    Primary care provider: No Ref-Primary, Physician           Chief Complaint:   Vianca Valle is a 36 year old female who is  @ 35w3d di/di twin pregnancy and being admitted for PPROM.  GBS unknown.  History of  section. IUGR Twin 1 with normal fetal echo, club foot.  GDMA1. Covid-19 negative today.           Pregnancy history:     OBSTETRIC HISTORY:    OB History    Para Term  AB Living   2 1 1 0 0 1   SAB IAB Ectopic Multiple Live Births   0 0 0 0 1      # Outcome Date GA Lbr Jeronimo/2nd Weight Sex Delivery Anes PTL Lv   2 Current            1 Term 19 41w0d 28:25 3.062 kg (6 lb 12 oz) M CS-LTranv EPI N JOSE      Complications: Failure to Progress in Second Stage, Fetal Intolerance      Name: Jules      Apgar1: 9  Apgar5: 9       EDC: Estimated Date of Delivery: Dec 9, 2021    Prenatal Labs:   Lab Results   Component Value Date    ABO A 2021    RH Pos 2021    AS Neg 2021    HEPBANG Nonreactive 2021    CHPCRT Negative 2021    GCPCRT Negative 2021    HGB 12.6 2021    HIV Negative 10/24/2013       GBS Status:   Lab Results   Component Value Date    GBS Negative 2018       Active Problem List  Patient Active Problem List   Diagnosis     Seasonal allergic rhinitis     S/P  section     Dichorionic diamniotic twin pregnancy in second trimester     Encounter for triage in pregnant patient       Medication Prior to Admission  Medications Prior to Admission   Medication Sig Dispense Refill Last Dose     calcium polycarbophil (FIBERCON) 625 MG tablet Take 2 tablets by mouth daily   Past Month at Unknown time     Docosahexaenoic Acid (DHA PO)    2021 at Unknown time     probiotic CAPS    2021 at Unknown time     acetone urine (KETOSTIX) test strip  Use to test for urine ketones daily, first urination of the day 50 strip 1      blood glucose (NO BRAND SPECIFIED) lancets standard Use to test blood sugar 4 times daily, fasting & 1 hour after breakfast, lunch & dinner 100 each 1      blood glucose (NO BRAND SPECIFIED) test strip Use to test blood sugar 4 times daily, fasting & 1 hour after breakfast, lunch & dinner 150 strip 3      blood glucose monitoring (NO BRAND SPECIFIED) meter device kit Use to test blood sugar 4 times daily, fasting & 1 hour after breakfast, lunch & dinner 1 kit 0      Prenatal Vit-Fe Fumarate-FA (PRENATAL PO) Take by mouth daily      .        Maternal Past Medical History:     Past Medical History:   Diagnosis Date     Allergic rhinitis      Depressive disorder      Diabetes (H)     GDM diet     Hematuria 2017     Ruptured ear drum, right 2016     Urinary tract infection      Varicella      Venereal warts                        Family History:   This patient has no significant family history            Social History:   This patient has no significant social history         Review of Systems:   CONSTITUTIONAL: NEGATIVE for fever, chills, change in weight  INTEGUMENTARY/SKIN: NEGATIVE for worrisome rashes, moles or lesions  EYES: NEGATIVE for vision changes or irritation  ENT/MOUTH: NEGATIVE for ear, mouth and throat problems  RESP: NEGATIVE for significant cough or SOB  BREAST: NEGATIVE for masses, tenderness or discharge  CV: NEGATIVE for chest pain, palpitations or peripheral edema  GI: NEGATIVE for nausea, abdominal pain, heartburn, or change in bowel habits  : NEGATIVE for frequency, dysuria, or hematuria  MUSCULOSKELETAL: NEGATIVE for significant arthralgias or myalgia  NEURO: NEGATIVE for weakness, dizziness or paresthesias  ENDOCRINE: NEGATIVE for temperature intolerance, skin/hair changes  HEME: NEGATIVE for bleeding problems  PSYCHIATRIC: NEGATIVE for changes in mood or affect          Physical Exam:     Vitals were  "reviewed  All vitals stable  Patient Vitals for the past 12 hrs:   BP Temp Temp src Pulse Resp SpO2 Height Weight   11/07/21 1934 118/73 97.8  F (36.6  C) Oral 94 16 98 % -- --   11/07/21 1900 -- -- -- -- -- -- 1.6 m (5' 3\") 88.9 kg (196 lb)   11/07/21 1820 (!) 137/90 98.5  F (36.9  C) Oral -- 16 -- -- --     Constitutional: Awake, alert, cooperative, no apparent distress, and appears stated age.  Eyes: Lids and lashes normal, pupils equal, round and reactive to light, extra ocular muscles intact, sclera clear, conjunctiva normal.  ENT: Normocephalic, without obvious abnormality, atramatic, sinuses nontender on palpation, external ears without lesions, oral pharynx with moist mucus membranes, tonsils without erythema or exudates, gums normal and good dentition.  Neck: Supple, symmetrical, trachea midline, no adenopathy, thyroid symmetric, not enlarged and no tenderness, skin normal.  Hematologic / Lymphatic: No cervical lymphadenopathy and no supraclavicular lymphadenopathy.  Back: Symmetric, no curvature, spinous processes are non-tender on palpation, paraspinous muscles are non-tender on palpation, no costal vertebral tenderness.  Lungs: No increased work of breathing, good air exchange, clear to auscultation bilaterally, no crackles or wheezing.  Cardiovascular: Regular rate and rhythm, normal S1 and S2, no S3 or S4, and no murmur noted.  Chest / Breast: Breasts symmetrical, skin without lesion(s), no nipple retraction or dimpling, no nipple discharge, no masses palpated, no axillary or supraclavicular adenopathy.  Abdomen: No scars, normal bowel sounds, soft, non-distended, non-tender, no masses palpated, no hepatosplenomegally.  Genitourinary: No urethral discharge, normal external genitalia, no hernia.  Musculoskeletal: No redness, warmth, or swelling of the joints.  Full range of motion noted.  Motor strength is 5 out of 5 all extremities bilaterally.  Tone is normal.  Neurologic: Awake, alert, oriented to " name, place and time.  Cranial nerves II-XII are grossly intact.  Motor is 5 out of 5 bilaterally.  Cerebellar finger to nose, heel to shin intact.  Sensory is intact.  Babinski down going, Romberg negative, and gait is normal.  Neuropsychiatric: Normal affect, mood, orientation, memory and insight.  Skin: No rashes, erythema, pallor, petechia or purpura.   Cervix:   Membranes: PPROM   Dilation: closed   Effacement: 0%   Station:0   Consistency: average   Position: Mid  Presentation:Cephalic/Breech  Fetal Heart Rate Tracing: Tier 1 (normal)  Tocometer: frequency q 5-10 minutes                       Assessment:   Vianca Valle is a 36 year old female who is  @ 35w3d di/di twin pregnancy and being admitted for PPROM.  GBS unknown.  History of  section. IUGR Twin 1 with normal fetal echo, club foot.  GDMA1.  Covid-19 negative today.          Plan:   Admit - see IP orders  Prepare for  section  History of  section, desires repeat  section.   GDMA1:  Glucose stable @ 71 @ 1910 today   Prematurity: 2/2 GDMA1, and the fact that we are proceeding with delivery in the next 30 minutes,  will not recommend betamethasone     Edda Mcneal, DO

## 2021-11-08 NOTE — PLAN OF CARE
Patient education done with parent(s) regarding erythromycin eye ointment, vitamin K injection, and hepatitis B vaccine. Parent(s) consent to baby A and B receiving all three medications.

## 2021-11-08 NOTE — PROVIDER NOTIFICATION
11/08/21 0203   Provider Notification   Provider Name/Title Dr. Mcneal   Method of Notification Phone   Request Evaluate - Remote   Notification Reason Status Update   Update MD that pt continues to have flow of bleeding and uterus is boggy. BPs WNL.    MD orders IM Methergine 0.2 mg be given.

## 2021-11-08 NOTE — PLAN OF CARE
Received report at around 0040H from [facility]. Patient already in bed, with D5LR running at 125mL. Issa cath removed at recovery room.    During the 0130H VS and fundal check, writer saw a large clot and almost soaked underpad, uterus was firm but blood was continuously coming out. Fundal massage was applied and writer called out for another RN for assistance. Dr. Mcneal was paged and came to room to assess. 2nd pit of bag was started and oral cytotec given. Improvement with bleeding noted.    At 0200H, patient said she felt blood coming out from her perineum again. Continuous flow of blood was noted during fundal check. Uterus felt boggy at times but would quickly get firm. IM methergine and IV fentanyl were given per MD orders.    Patient's vitals and PP checks has been WNL since.    Total blood loss at PP was 991 mL.

## 2021-11-09 VITALS
OXYGEN SATURATION: 99 % | HEIGHT: 63 IN | SYSTOLIC BLOOD PRESSURE: 119 MMHG | TEMPERATURE: 98.5 F | BODY MASS INDEX: 34.73 KG/M2 | RESPIRATION RATE: 16 BRPM | DIASTOLIC BLOOD PRESSURE: 65 MMHG | WEIGHT: 196 LBS | HEART RATE: 85 BPM

## 2021-11-09 PROCEDURE — 250N000013 HC RX MED GY IP 250 OP 250 PS 637: Performed by: FAMILY MEDICINE

## 2021-11-09 RX ORDER — AMOXICILLIN 250 MG
1 CAPSULE ORAL 2 TIMES DAILY PRN
Qty: 45 TABLET | Refills: 1 | Status: SHIPPED | OUTPATIENT
Start: 2021-11-09 | End: 2023-06-12

## 2021-11-09 RX ORDER — OXYCODONE HYDROCHLORIDE 5 MG/1
5 TABLET ORAL EVERY 4 HOURS PRN
Qty: 12 TABLET | Refills: 0 | Status: SHIPPED | OUTPATIENT
Start: 2021-11-09 | End: 2023-06-12

## 2021-11-09 RX ORDER — IBUPROFEN 800 MG/1
800 TABLET, FILM COATED ORAL EVERY 8 HOURS PRN
Qty: 45 TABLET | Refills: 1 | Status: SHIPPED | OUTPATIENT
Start: 2021-11-09 | End: 2023-06-12

## 2021-11-09 RX ORDER — ACETAMINOPHEN 325 MG/1
975 TABLET ORAL EVERY 6 HOURS PRN
Qty: 45 TABLET | Refills: 1 | Status: SHIPPED | OUTPATIENT
Start: 2021-11-09 | End: 2023-06-12

## 2021-11-09 RX ADMIN — SENNOSIDES AND DOCUSATE SODIUM 2 TABLET: 50; 8.6 TABLET ORAL at 09:11

## 2021-11-09 RX ADMIN — IBUPROFEN 800 MG: 800 TABLET, FILM COATED ORAL at 00:28

## 2021-11-09 RX ADMIN — OXYCODONE HYDROCHLORIDE 5 MG: 5 TABLET ORAL at 17:11

## 2021-11-09 RX ADMIN — ACETAMINOPHEN 975 MG: 325 TABLET, FILM COATED ORAL at 09:11

## 2021-11-09 RX ADMIN — IBUPROFEN 800 MG: 800 TABLET, FILM COATED ORAL at 13:18

## 2021-11-09 RX ADMIN — ACETAMINOPHEN 975 MG: 325 TABLET, FILM COATED ORAL at 14:53

## 2021-11-09 RX ADMIN — IBUPROFEN 800 MG: 800 TABLET, FILM COATED ORAL at 06:19

## 2021-11-09 RX ADMIN — ACETAMINOPHEN 975 MG: 325 TABLET, FILM COATED ORAL at 03:16

## 2021-11-09 ASSESSMENT — ACTIVITIES OF DAILY LIVING (ADL)
ADLS_ACUITY_SCORE: 3

## 2021-11-09 NOTE — DISCHARGE SUMMARY
DELIVERY DISCHARGE SUMMARY    Admit date: 2021  Discharge date: 2021     Admit Dx:   - 36 year old  at 35w3d   Encounter for triage in pregnant patient [Z36.89]  Dichorionic diamniotic twin pregnancy in second trimester [O30.042]  History of CS x1  PPROM  IUGR and club foot baby A  GDM    Discharge Dx:  - Same as above, s/p  delivery  Clinically significant ABLA    Procedures:  -  delivery    Admit HPI:  Vianca Valle is a 36 year old female who is  @ 35w3d di/di twin pregnancy and being admitted for PPROM.  GBS unknown.  History of  section. IUGR Twin 1 with normal fetal echo, club foot.  GDMA1. Covid-19 negative today.     Please see her admit H&P for full details of her PMH, PSH, Meds, Allergies and exam on admit.    Hospital course:    Indications for :  section for twin gestation      EBL from the delivery was 337cc QBL. Please see her  Section Operative Note for full details regarding her delivery.    Her postoperative course was complicated by nothing.  At the time of discharge, she was meeting all of her postpartum goals and deemed stable for discharge. She was voiding without difficulty, tolerating a regular diet without nausea and vomiting, her pain was well controlled on oral pain medicines and her lochia was appropriate. She was hemodynamically stable at the time of discharge.      Physical exam on the day of discharge:  Vitals:    21 1827 21 2024 21 0015 21 0917   BP: 104/64 109/80 109/62 123/73   BP Location:       Patient Position:       Pulse: 84 85 79 90   Resp: 16 16 16 16   Temp: 98.8  F (37.1  C) 98.3  F (36.8  C) 98.3  F (36.8  C) 97.8  F (36.6  C)   TempSrc: Oral Oral Oral Oral   SpO2:       Weight:       Height:           General: sitting up, alert and cooperative  Abd: soft, non-distended, non-tender. Fundus firm, nontender, 2 cm below umbilicus.   Incision  clean/dry/intact  Extremities: calves nontender, 1+ edema of lower extremities bilaterally    Lab Results   Component Value Date    HGB 9.5 11/08/2021    HGB 12.6 11/07/2021    HGB 13.2 05/12/2021    HGB 10.9 01/21/2019     Blood type:   Lab Results   Component Value Date    RH Pos 05/12/2021       Discharge/Disposition:  Ms. Vianca Valle was discharged to home in stable condition with the following instructions/medications:  1) Call for temperature > 100.4, foul smelling vaginal discharge, bleeding > 1 pad per hour x 2 hrs, pain not controlled by oral pain meds, thoughts of self-harm or harming the infant.  2) Contraception counseling was provided.  3) She was instructed to follow-up with her primary OB in 6 weeks for a routine postpartum visit, and in 1 week for a blood pressure check if having any blood pressure issues while hospitalized.  Also plan for 2h GTT at 6-8 weeks postpartum   4) She was instructed to continue her PNV on discharge if she wished to breast feed her infant.  5) She was discharged home with the following medications: ibu, apap, roxicodone, iron.          Susan Pino MD, MPH  [facility]

## 2021-11-09 NOTE — PLAN OF CARE
VS WNL.  Fundus firm, light lochia, few small clots after getting up to bathroom.  Ambulated to bathroom and yen catheter removed at 2015.  Adequate output in yen, due to void later tonight.  Saline locked.  Tolerating regular diet. Pain well managed with Toradol and tylenol.  Incision covered with island dressing, intact, small dried drainage noted.  No signs of infection noted. Breast pumping for infant twins in NICU.  Visited NICU later this shift, and ipad in room.  Significant other at bedside and supportive.  Continue to monitor.

## 2021-11-09 NOTE — PLAN OF CARE
Patient vitally stable, voiding without issue, tolerating regular diet, ambulating independently. Fundal checks WDL. Lochia is scant, no clots. C/S dressing WDL. Pain adequately managed with Tylenol and Ibuprofen. Abdominal binder provided to patient. Patient is pumping and bringing milk down to NICU to visit infant. Patient interested in speaking with lactation consultant about renting a pump through insurance- will pass this information onto the next shift. Pt denies breast soreness.  present and supportive. Both parents are attentive to infant. Patient voiced interest in potentially discharging this evening.

## 2021-11-09 NOTE — PLAN OF CARE
VSS. UTV Incision. Up independent. Voiding without difficulty. Pain managed with tylenol and ibuprofen. Pumping and bring milk down to NICU. Visiting infants in NICU. FOB at bedside, supportive.

## 2021-11-09 NOTE — LACTATION NOTE
"Lactation follow up visit. The twins are Vianca's second and third babies. Vianca pumped and bottle fed her first baby for 10 months. Vianca requested a hospital grade pump. She already ordered a pump through her insurance. Vianca reports pumping every 3 hours has been challenging in the hospital and says it \"should be easier once I am home.\" Vianca has had a hard time hand expressing. Writer helped Vianca hand express and colostrum was visualized. Writer encouraged pumping every 3 hours to help milk production. Vianca knows lactation can come down to NICU to help with breastfeeding when babies are ready. Plan for continued lactation support.   "

## 2021-11-10 ENCOUNTER — HOSPITAL ENCOUNTER (OUTPATIENT)
Facility: CLINIC | Age: 36
End: 2021-11-10
Payer: COMMERCIAL

## 2021-11-10 ENCOUNTER — LACTATION ENCOUNTER (OUTPATIENT)
Age: 36
End: 2021-11-10

## 2021-11-10 NOTE — PLAN OF CARE
Data: Vital signs within normal limits. Postpartum checks within normal limits - see flow record. Patient eating and drinking normally. Patient able to empty bladder independently and is up ambulating. No apparent signs of infection. Incision healing well. Patient performing self cares and is able to care for infant.  Action: Patient medicated during the shift for pain. See MAR. Patient reassessed within 1 hour after each medication and pain was improved - patient stated she was comfortable. Using abdominal binder for comfort.  All discharge instructions reviewed with patient and all questions answered.  Discharge medications given and instructions reviewed with pt.  Pt has breast pump at home and is also renting medical grade pump.  Lactation visit this shift (see lactation note).  Pt stated her blood pressure was slightly elevated before giving birth, BPs have been WNL postpartum.  Reviewed s/s of PIH and to notify MD, pt verbalizes understanding.    Response: Infants in NICU, Ipad in room and pt visits NICU.  Significant other at bedside and present.    Plan: Follow up with OB in 6 weeks for postpartum check, pt verbalizes understanding. Patient discharged from unit at 1900, pt going to NICU to visit infants.  All belongings home with pt.

## 2021-11-10 NOTE — LACTATION NOTE
This note was copied from a baby's chart.  Lactation follow up visit. Vianca was at bedside of twins (Emiliano and Brandy) in the NICU at the time of visit. Vianca pumped overnight with a hospital grade rental pump and got 15mL of EBM for the babies. Vianca asked for help hand expressing. Writer assisted and EMB was easily expressed into a collection container. Vianca return demonstrated with good success. Babies have not gone to breast yet. Vianca and CHELSEA had questions about what foods and drinks help milk production. Writer discussed importance of staying hydrated. Also reviewed healthy fats and oats. Plan for continued lactation support.

## 2021-11-11 ENCOUNTER — LACTATION ENCOUNTER (OUTPATIENT)
Age: 36
End: 2021-11-11

## 2021-11-11 LAB
PATH REPORT.ADDENDUM SPEC: NORMAL
PATH REPORT.COMMENTS IMP SPEC: NORMAL
PATH REPORT.FINAL DX SPEC: NORMAL
PATH REPORT.GROSS SPEC: NORMAL
PATH REPORT.MICROSCOPIC SPEC OTHER STN: NORMAL
PATH REPORT.RELEVANT HX SPEC: NORMAL
PHOTO IMAGE: NORMAL

## 2021-11-11 NOTE — LACTATION NOTE
This note was copied from a baby's chart.  Lactation visit. Vianca has been pumping for twins Emiliano and Brandy, this is her second pregnancy. She reports exclusively pumping for ten months with her first child. As of this morning she is getting 70mL combined and states her breasts are full and sore. She has some concerns with clogged ducts with her first child, used heat/vibration to manage clogs. Vianca has been pumping on the initiate setting on the hospital grade pump. Writer discussed using heat before and during pumping, hands on massage throughout pumping, and pumping on maintenance mode. Encouraged Vianca to pump every 3 hours and until milk is no longer spraying/ at least 20 minutes per session. Encouraged ibuprofen and ice between pumping sessions to assist with inflammation. Discussed clogged ducts at length, encouraged comfortable/non tight fitting bra/tank, avoiding side sleeping, possible use of sunflower lecithin if needed. Vianca states babies are beginning to cue with feedings, writer encouraged her to reach out for lactation support when babies are ready to go to breast, and with any additional questions. Bedside RN updated on visit.

## 2021-11-17 ENCOUNTER — LACTATION ENCOUNTER (OUTPATIENT)
Age: 36
End: 2021-11-17

## 2021-11-17 NOTE — LACTATION NOTE
This note was copied from a baby's chart.  Lactation visit per mother request for questions regarding pumping. She is planning to exclusively pump and asking about galactagogues. Reviewed pumping schedule and encouraged her to pump every 3 hours, as she is able. She has a hospital grade rental and a hands free pump, which she can utilize at home to make it easier with her toddler. She has been taking lecithin, but has not had any history of clogs or mastitis. Encouraged weaning off if she doesn't have issues with clogging/mastitis. Gave her information on herbal galactagogues and discussed prescriptions that can be utilized to increase supply. She is only interested in herbal OTC supplements at this time. All other questions answered and encouraged close follow up with outpatient LC for anticipated future needs.

## 2021-11-29 ENCOUNTER — TELEPHONE (OUTPATIENT)
Dept: OBGYN | Facility: CLINIC | Age: 36
End: 2021-11-29
Payer: COMMERCIAL

## 2021-11-29 NOTE — TELEPHONE ENCOUNTER
Form received from: [employer]    Form requesting following info/need: STD    KEILY needed?: No    Location of form: [facility]'s desk    When completed the route for return: Fax [phone]

## 2021-12-06 NOTE — TELEPHONE ENCOUNTER
Completed and signed form faxed to [employer]. Copy sent to scan and original placed in Dr. Leyva's nurse bin. Susu Simon LPN

## 2021-12-22 ENCOUNTER — PRENATAL OFFICE VISIT (OUTPATIENT)
Dept: OBGYN | Facility: CLINIC | Age: 36
End: 2021-12-22
Payer: COMMERCIAL

## 2021-12-22 VITALS — DIASTOLIC BLOOD PRESSURE: 74 MMHG | BODY MASS INDEX: 30.7 KG/M2 | SYSTOLIC BLOOD PRESSURE: 124 MMHG | WEIGHT: 173.3 LBS

## 2021-12-22 DIAGNOSIS — Z86.32 HX GESTATIONAL DIABETES: ICD-10-CM

## 2021-12-22 DIAGNOSIS — Z30.011 OCP (ORAL CONTRACEPTIVE PILLS) INITIATION: ICD-10-CM

## 2021-12-22 DIAGNOSIS — Z12.4 SCREENING FOR CERVICAL CANCER: ICD-10-CM

## 2021-12-22 PROCEDURE — 99207 PR POST PARTUM EXAM: CPT | Performed by: OBSTETRICS & GYNECOLOGY

## 2021-12-22 PROCEDURE — 87624 HPV HI-RISK TYP POOLED RSLT: CPT | Performed by: OBSTETRICS & GYNECOLOGY

## 2021-12-22 PROCEDURE — G0145 SCR C/V CYTO,THINLAYER,RESCR: HCPCS | Performed by: OBSTETRICS & GYNECOLOGY

## 2021-12-22 RX ORDER — ACETAMINOPHEN AND CODEINE PHOSPHATE 120; 12 MG/5ML; MG/5ML
0.35 SOLUTION ORAL DAILY
Qty: 90 TABLET | Refills: 3 | Status: SHIPPED | OUTPATIENT
Start: 2021-12-22 | End: 2023-04-27

## 2021-12-22 ASSESSMENT — PATIENT HEALTH QUESTIONNAIRE - PHQ9: SUM OF ALL RESPONSES TO PHQ QUESTIONS 1-9: 0

## 2021-12-22 NOTE — NURSING NOTE
"Chief Complaint   Patient presents with     Postpartum Care     C/S 2021   Di Di twins          Initial /74 (BP Location: Left arm, Cuff Size: Adult Regular)   Wt 78.6 kg (173 lb 4.8 oz)   LMP 2021 (Exact Date)   Breastfeeding No   BMI 30.70 kg/m   Estimated body mass index is 30.7 kg/m  as calculated from the following:    Height as of 21: 1.6 m (5' 3\").    Weight as of this encounter: 78.6 kg (173 lb 4.8 oz).  BP completed using cuff size: regular    Questioned patient about current smoking habits.  Pt. quit smoking some time ago.          The following HM Due: NONE      Clarissa Good, RAYNA on 2021 at 9:34 AM       "

## 2021-12-22 NOTE — PROGRESS NOTES
SUBJECTIVE:  Vianca Valle,  is here for a postpartum visit.  She had a repeat LST  Section  on 21 after presenting with PPROM  delivering  2 healthy baby twins, a boy and a girl, named Emiliano and Brandy weighing 4 lbs 1 oz  And 3lb 13oz respectively at 35w3d.  They were discharged from the NICU in 10 days and are home and doing well.     HPI:  Pregnancy had been complicated by twin gestation with IUGR and club foot (Twin 1).  GDMA2 present.    Last PHQ-9 score on record=   PHQ-9 SCORE 2019   PHQ-9 Total Score 2     MARY-7 SCORE 2018 3/4/2019 2019   Total Score - - -   Total Score 1 0 0       Delivery complications:  No  Breast feeding:  Yes, pumping  Bladder problems:  No  Bowel problems/hemorrhoids:  No  Episiotomy/laceration/incision healed? N/A  Vaginal flow:  None  Grace:  No  Contraception: oral contraceptives for now and  plans vasectomy  Emotional adjustment:  doing well, happy and tired  Back to work:  quiting job    12 point review of systems negative other than symptoms noted below.    OBJECTIVE:  Vitals: LMP 2021 (Exact Date)   BMI= There is no height or weight on file to calculate BMI.  General - pleasant female in no acute distress.  Breast -  deferred  Abdomen - Incision well-healed  Pelvic - EG: normal adult female, BUS: within normal limits, Vagina: well rugated, no discharge, Cervix: no lesions or CMT, Uterus: firm, normal sized and nontender, anteverted in position. Adnexae: no masses or tenderness.  Rectovaginal - deferred.    ASSESSMENT:    ICD-10-CM    1. Routine postpartum follow-up  Z39.2        PLAN:  May resume normal activities without restrictions.  Pap smear was  done today.    Full counseling was provided, and all questions were answered.   Return to clinic in one year for an annual visit.   Script for micronor sent to pharmacy and information on vasectomy provided    Jonny Leyva MD

## 2021-12-24 LAB
BKR LAB AP GYN ADEQUACY: NORMAL
BKR LAB AP GYN INTERPRETATION: NORMAL
BKR LAB AP HPV REFLEX: NORMAL
BKR LAB AP PREVIOUS ABNORMAL: NORMAL
PATH REPORT.COMMENTS IMP SPEC: NORMAL
PATH REPORT.COMMENTS IMP SPEC: NORMAL
PATH REPORT.RELEVANT HX SPEC: NORMAL

## 2021-12-28 LAB
HUMAN PAPILLOMA VIRUS 16 DNA: NEGATIVE
HUMAN PAPILLOMA VIRUS 18 DNA: NEGATIVE
HUMAN PAPILLOMA VIRUS FINAL DIAGNOSIS: NORMAL
HUMAN PAPILLOMA VIRUS OTHER HR: NEGATIVE

## 2022-01-12 ENCOUNTER — MEDICAL CORRESPONDENCE (OUTPATIENT)
Dept: HEALTH INFORMATION MANAGEMENT | Facility: CLINIC | Age: 37
End: 2022-01-12
Payer: COMMERCIAL

## 2022-03-15 ENCOUNTER — MEDICAL CORRESPONDENCE (OUTPATIENT)
Dept: HEALTH INFORMATION MANAGEMENT | Facility: CLINIC | Age: 37
End: 2022-03-15
Payer: COMMERCIAL

## 2022-04-01 DIAGNOSIS — Z30.011 OCP (ORAL CONTRACEPTIVE PILLS) INITIATION: Primary | ICD-10-CM

## 2022-04-01 RX ORDER — LEVONORGESTREL/ETHIN.ESTRADIOL 0.1-0.02MG
1 TABLET ORAL DAILY
Qty: 84 TABLET | Refills: 3 | Status: SHIPPED | OUTPATIENT
Start: 2022-04-01 | End: 2023-03-20

## 2022-05-10 ENCOUNTER — MEDICAL CORRESPONDENCE (OUTPATIENT)
Dept: HEALTH INFORMATION MANAGEMENT | Facility: CLINIC | Age: 37
End: 2022-05-10
Payer: COMMERCIAL

## 2022-06-02 ENCOUNTER — TELEPHONE (OUTPATIENT)
Dept: OBGYN | Facility: CLINIC | Age: 37
End: 2022-06-02
Payer: COMMERCIAL

## 2022-06-02 NOTE — TELEPHONE ENCOUNTER
I spoke with the patient today regarding a concern that she had after she missed 2 of her birth control pills.  The patient has been on the birth control pills.  She had intercourse on cycle day 14 which was Saturday, May 28 and then missed pill #16 on Monday, May 30 and pill 17 on May 31, 2022.  She then resumed taking the pill on Wednesday, June 1, 2022 which was pill #18.  The patient has started having some vaginal spotting.  We discussed the probability of pregnancy.  I feel it is very low in this patient as she has had no other difficulties with the pill prior.  At this point I would not recommend Plan B as she is greater than 72 hours post intercourse and have been on a birth control pill.  I will recommend that she continue with the OCP and call if she does not get a menses.  I reviewed the low probability of birth defects and the unusual possibility that she was early pregnant while taking the pill

## 2022-09-10 ENCOUNTER — HEALTH MAINTENANCE LETTER (OUTPATIENT)
Age: 37
End: 2022-09-10

## 2023-03-10 DIAGNOSIS — Z30.011 OCP (ORAL CONTRACEPTIVE PILLS) INITIATION: ICD-10-CM

## 2023-03-20 RX ORDER — LEVONORGESTREL AND ETHINYL ESTRADIOL 0.1-0.02MG
KIT ORAL
Qty: 84 TABLET | Refills: 0 | Status: SHIPPED | OUTPATIENT
Start: 2023-03-20 | End: 2023-06-12

## 2023-03-20 NOTE — TELEPHONE ENCOUNTER
Prescription approved per [facility] Refill Protocol.  Pt has an appt with Dr. Garcia on 4/27/23.  Tiesha PALMA RN

## 2023-03-20 NOTE — TELEPHONE ENCOUNTER
Patient scheduled her Annual with . She is completely out her BC. Please call it in to her Pharmacy today

## 2023-04-27 ENCOUNTER — E-CONSULT (OUTPATIENT)
Dept: ALLERGY | Facility: CLINIC | Age: 38
End: 2023-04-27

## 2023-04-27 ENCOUNTER — OFFICE VISIT (OUTPATIENT)
Dept: OBGYN | Facility: CLINIC | Age: 38
End: 2023-04-27
Payer: COMMERCIAL

## 2023-04-27 VITALS
SYSTOLIC BLOOD PRESSURE: 112 MMHG | BODY MASS INDEX: 31.99 KG/M2 | DIASTOLIC BLOOD PRESSURE: 74 MMHG | HEART RATE: 78 BPM | WEIGHT: 180.6 LBS

## 2023-04-27 DIAGNOSIS — Z01.419 ENCOUNTER FOR GYNECOLOGICAL EXAMINATION WITHOUT ABNORMAL FINDING: Primary | ICD-10-CM

## 2023-04-27 DIAGNOSIS — J30.2 SEASONAL ALLERGIC RHINITIS, UNSPECIFIED TRIGGER: ICD-10-CM

## 2023-04-27 DIAGNOSIS — Z30.011 OCP (ORAL CONTRACEPTIVE PILLS) INITIATION: ICD-10-CM

## 2023-04-27 DIAGNOSIS — Z86.32 HX GESTATIONAL DIABETES: ICD-10-CM

## 2023-04-27 LAB — HBA1C MFR BLD: 5.5 % (ref 0–5.6)

## 2023-04-27 PROCEDURE — 83036 HEMOGLOBIN GLYCOSYLATED A1C: CPT | Performed by: OBSTETRICS & GYNECOLOGY

## 2023-04-27 PROCEDURE — 99395 PREV VISIT EST AGE 18-39: CPT | Performed by: OBSTETRICS & GYNECOLOGY

## 2023-04-27 PROCEDURE — 36415 COLL VENOUS BLD VENIPUNCTURE: CPT | Performed by: OBSTETRICS & GYNECOLOGY

## 2023-04-27 RX ORDER — ACETAMINOPHEN AND CODEINE PHOSPHATE 120; 12 MG/5ML; MG/5ML
0.35 SOLUTION ORAL DAILY
Qty: 90 TABLET | Refills: 3 | Status: SHIPPED | OUTPATIENT
Start: 2023-04-27 | End: 2023-06-12

## 2023-04-27 RX ORDER — CETIRIZINE HYDROCHLORIDE 10 MG/1
TABLET ORAL
COMMUNITY
Start: 2023-02-27 | End: 2024-05-29

## 2023-04-27 NOTE — PROGRESS NOTES
Annual breast and pelvic exam    Ms Vianca Valle 38 year old presents for annual breast and pelvic exam.     She also has other medical concerns that she wants to discuss.  She reports a history of gestational diabetes, and wants to be tested for diabetes.  Also she would like a refill on her birth control pills.  Her  has had a vasectomy, but has yet to pursue confirmation that he is sterile; therefore, in the meantime, she will use birth control pills as contraception until her  verifies that he is sterile.    She would also like a referral to an allergy specialist as she suffers from year-round seasonal allergies.  She would like to look into whether there is a specific environmental factor that is causing her year-round seasonal allergies.  Currently, she has been dealing with itching in her right eye as well as swelling in her right eye.  She is uncertain as to how this started.  For now, her swelling has improved, but the irritation comes and goes.  She inquires about medications or eyedrops to use to help alleviate the irritation.      Past Medical History:   Diagnosis Date     Allergic rhinitis      Depressive disorder      Diabetes (H)     GDM diet     Hematuria 2017     Ruptured ear drum, right 2016     Urinary tract infection      Varicella      Venereal warts        Past Surgical History:   Procedure Laterality Date      SECTION N/A 2019    Procedure:  SECTION;  Surgeon: Dotty España MD;  Location: [facility]      SECTION N/A 2021    Procedure:  SECTION;  Surgeon: Edda Mcneal DO;  Location: [facility]     GYN SURGERY       TONSILLECTOMY      2002       Current Outpatient Medications   Medication     cetirizine (ZYRTEC) 10 MG tablet     SRONYX 0.1-20 MG-MCG tablet     acetaminophen (TYLENOL) 325 MG tablet     acetone urine (KETOSTIX) test strip     blood glucose (NO BRAND SPECIFIED) lancets standard     blood glucose (NO BRAND  SPECIFIED) test strip     blood glucose monitoring (NO BRAND SPECIFIED) meter device kit     calcium polycarbophil (FIBERCON) 625 MG tablet     Docosahexaenoic Acid (DHA PO)     ibuprofen (ADVIL/MOTRIN) 800 MG tablet     norethindrone (MICRONOR) 0.35 MG tablet     oxyCODONE (ROXICODONE) 5 MG tablet     Prenatal Vit-Fe Fumarate-FA (PRENATAL PO)     probiotic CAPS     senna-docusate (SENOKOT-S/PERICOLACE) 8.6-50 MG tablet     No current facility-administered medications for this visit.       No Known Allergies    Social History     Tobacco Use     Smoking status: Former     Packs/day: 0.50     Years: 10.00     Pack years: 5.00     Types: Cigarettes     Start date: 6/1/2006     Smokeless tobacco: Never     Tobacco comments:     02/2018   Substance Use Topics     Alcohol use: No     Alcohol/week: 1.0 - 3.0 standard drink of alcohol     Drug use: No       Family History   Problem Relation Age of Onset     Thyroid Disease Maternal Grandmother      C.A.D. Maternal Grandfather      Diabetes Paternal Grandmother        ROS: 10 point review of systems negative except for pertinent positives stated in the HPI      Exam:   /74 (BP Location: Right arm, Cuff Size: Adult Regular)   Pulse 78   Wt 81.9 kg (180 lb 9.6 oz)   LMP 04/13/2023 (Approximate)   BMI 31.99 kg/m    General Appearance: Well nourished, well developed female, NAD, AOx3  Neurological: Mental Status Normal and Station and Gait Normal   HEET: Atraumatic, normocephalic  Breasts: normal without suspicious masses, skin changes or axillary nodes, symmetric fibrous changes in both upper outer quadrants, self exam is taught and encouraged.  Pelvic: Normal external female genitalia.  No external lesions, normal hair distribution, no adenopathy. Speculum exam reveals vaginal epithelium well rugated with no abnormal discharge. Cervix appears smooth, pink, with no visible lesions. Bimanual exam reveals normal size uterus, anteverted, non-tender, and mobile. No  adnexal masses or tenderness. No cervical motion tenderness.     A/P:   1) Annual breast and pelvic exam  -- Normal clinical exam  -- Recommended mammogram screening to start next year at age 40  -- Pap smear is up-to-date    2) Eye irritation and swelling  -- Suspect it is allergy related; advised on allergy eyedrops as well as eyedrops to address dry eyes    3) Seasonal allergies  -- We will order allergy referral    4) Hx of gestational diabetes mellitus; diabetes screening  -- Hemoglobin A1C ordered; informed patient that if it is abnormal, then we will refer her to an endocrinologist    5) Contraception  -- Contraceptive pill was refilled    Radha Rodrigez MD  [facility]

## 2023-04-27 NOTE — NURSING NOTE
"Chief Complaint   Patient presents with     Gyn Exam     Pelvic & Breast exam   Pap UTD  2021  NIL    Refill OCP's        Initial /74 (BP Location: Right arm, Cuff Size: Adult Regular)   Pulse 78   Wt 81.9 kg (180 lb 9.6 oz)   LMP 2023 (Approximate)   BMI 31.99 kg/m   Estimated body mass index is 31.99 kg/m  as calculated from the following:    Height as of 21: 1.6 m (5' 3\").    Weight as of this encounter: 81.9 kg (180 lb 9.6 oz).  BP completed using cuff size: regular    Questioned patient about current smoking habits.  Pt. quit smoking some time ago.          The following HM Due: NONE      Clarissa Good CMA on 2023 at 10:44 AM       "

## 2023-04-27 NOTE — PROGRESS NOTES
4/27/2023     E-Consult has been denied due to: Complexity of question, needs in-person referral.    Interprofessional consultation requested by:  Radha Rodrigez MD      Clinical Question/Purpose: MY CLINICAL QUESTION IS: All year around seasonal allergies; inquires about environment allergen testing     Patient assessment and information reviewed: Chart, wants testing which is done in person.    Recommendations: Will make an appointment in person.       The recommendations provided in this E-Consult are based on a review of clinical data pertinent to the clinical question presented, without a review of the patient's complete medical record or, the benefit of a comprehensive in-person or virtual patient evaluation. This consultation should not replace the clinical judgement and evaluation of the provider ordering this E-Consult. Any new clinical issues, or changes in patient status since the filing of this E-Consult will need to be taken into account when assessing these recommendations. Please contact me if you have further questions.    My total time spent reviewing clinical information and formulating assessment was 5 minutes.        Bartolo Schuster MD

## 2023-06-12 ENCOUNTER — OFFICE VISIT (OUTPATIENT)
Dept: ALLERGY | Facility: CLINIC | Age: 38
End: 2023-06-12
Payer: COMMERCIAL

## 2023-06-12 VITALS
BODY MASS INDEX: 31.74 KG/M2 | DIASTOLIC BLOOD PRESSURE: 82 MMHG | WEIGHT: 179.2 LBS | SYSTOLIC BLOOD PRESSURE: 120 MMHG | HEART RATE: 93 BPM | OXYGEN SATURATION: 95 %

## 2023-06-12 DIAGNOSIS — R09.82 POST-NASAL DRAINAGE: ICD-10-CM

## 2023-06-12 DIAGNOSIS — R06.7 SNEEZING: Primary | ICD-10-CM

## 2023-06-12 PROCEDURE — 95004 PERQ TESTS W/ALRGNC XTRCS: CPT | Performed by: INTERNAL MEDICINE

## 2023-06-12 PROCEDURE — 99203 OFFICE O/P NEW LOW 30 MIN: CPT | Mod: 25 | Performed by: INTERNAL MEDICINE

## 2023-06-12 ASSESSMENT — ENCOUNTER SYMPTOMS
MYALGIAS: 0
JOINT SWELLING: 0
CHILLS: 0
CHEST TIGHTNESS: 0
SINUS PRESSURE: 0
EYE ITCHING: 1
RHINORRHEA: 1
ADENOPATHY: 0
EYE DISCHARGE: 0
DIARRHEA: 0
WHEEZING: 0
FACIAL SWELLING: 0
COUGH: 0
SHORTNESS OF BREATH: 0
VOMITING: 0
FEVER: 0
ARTHRALGIAS: 0
HEADACHES: 0
NAUSEA: 0
EYE REDNESS: 0
ACTIVITY CHANGE: 0

## 2023-06-12 NOTE — PROGRESS NOTES
Per provider verbal order, placed Adult Environmental Panel scratch test.   Once panels were placed, patient was monitored for 15 minutes in clinic.  Provider read test after 15 minutes.  Pt tolerated procedure well.  All questions and concerns were addressed at office visit.     DRAKE GarciaN, RN

## 2023-06-12 NOTE — PATIENT INSTRUCTIONS
Skin test was borderline positive to dust mite.  We will check labs for a few additional allergens, including dust mites.  May try Xyzal 5 mg to see if there is any more effective than Zyrtec and Claritin.  Pataday eyedrops could continue as needed for itchy eyes.  If you want to try a nasal steroid, Flonase Sensimist could be used.  If allergens are definitively positive on blood testing may consider allergy shots.    Allergy Staff Appt Hours Shot Hours Location       Physician   Bartolo Schuster MD      Support Staff   JUDD Rob, JUDD GARCIA, RUSTY CASTILLO, LPN      Mondays Tuesdays Thursdays and Fridays:  [place] 7-5 Wednesdays  Close                Mondays, Tuesdays and Fridays:  7:20 - 3:40              [facility]  [address]  [place] [address]  Appt Line: [phone]    Pulmonary Function Scheduling:  [place]: [phone]

## 2023-06-12 NOTE — PROGRESS NOTES
Vianca Valle was seen in the Allergy Clinic at [facility].    Vianca Valle is a 38 year old female being seen today at the request of Radha Rodrigez MD [facility] in consultation for Seasonal allergic rhinitis.    She has symptoms of sneezing, nasal congestion, postnasal drainage, sinus headaches, and itchy eyes which are all year-round.  She has tried Pataday, Zyrtec and Claritin with minimal benefit.  This has been an ongoing problem for several years.  She also also had problems with Tylenol on a couple different occasions.  She had hives as a child and the other time she had itchy skin as an adult.  However 95% of the time she takes Tylenol she has no issues.    When she drinks dairy products she will have increased phlegm and postnasal drainage.    On 1 occasion she had severe eye swelling at work which was itchy without an obvious cause.  Benadryl did not provide benefit.    Patient supplied answers from flow sheet for:  Allergy and Asthma Intake Questionnaire.  Allergy and Asthma Questionnaire Social History  Family Doctor: : (P) None  Did He or She suggest you see us?: (P) Yes  Clinic:: (P) [facility]  Check the reasons for your appointment: (P) Nasal or Sinus Symptoms, Eye Symptoms, Eczema (red, itchy, scaly or inflamed skin), Food Allergy  Tell us about your home: (P) House, Feather pillow, Bedroom Carpet, in country, woods or near lake, Forced air heat, Wood-burning stove or fire place, Air conditioning, Damp Basement, Mold Growth  Job:: (P) Medical laboratory scientist    Allergy Testing  Have you been Tested for Allergies? : (P) No    Nasal and Eye Symptoms   Check any nasal or sinus symptoms you have: : (P) Runny Nose, Sneezing, Stuffy head or nose, Drainage down throat, Sinus headaches  Have you had Nasal polyps?: (P) No  Have you had sinus surgery?: (P) No  Have you had sinus infections?: (P) No  Have you used any pills for  symptoms?: (P) Yes  Names:: (P) Zyrtec, claritin  Have you used any eye drops: (P) Yes  Names:: (P) Pataday  When do nasal, sinus or eye symptoms occur?: (P) Spring, Summer, Fall, Morning, Winter, Evening, At work or school, Outdoors, Indoors   What makes nasal, sinus or eye symptoms worse?: (P) Dust, Temperature changes, Raking leaves, Scents, perfumes      Do you eat any of the foods listed below?  Eggs:: (P) Yes  Milk:: (P) Yes  Wheat:: (P) Yes  Soy:: (P) Yes  Peanuts:: (P) Yes  Fish:: (P) Yes  Shellfish:: (P) Yes        Past Medical History:   Diagnosis Date     Allergic rhinitis      Depressive disorder      Diabetes (H)     GDM diet     Hematuria 2017     Ruptured ear drum, right 2016     Urinary tract infection      Varicella      Venereal warts      Family History   Problem Relation Age of Onset     Thyroid Disease Maternal Grandmother      C.A.D. Maternal Grandfather      Diabetes Paternal Grandmother      Past Surgical History:   Procedure Laterality Date      SECTION N/A 2019    Procedure:  SECTION;  Surgeon: Dotty España MD;  [facility]      SECTION N/A 2021    Procedure:  SECTION;  Surgeon: Edda Mcneal DO;  Location: [facility]     GYN SURGERY       TONSILLECTOMY      2002       ENVIRONMENTAL HISTORY:   Pets inside the house include None.  Do you smoke cigarettes or other recreational drugs? No There is/are 0 smokers living in the house. The house does have a damp basement.     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 She lives with her  and 3 kids.      Review of Systems   Constitutional: Negative for activity change, chills and fever.   HENT: Positive for congestion, rhinorrhea and sneezing. Negative for dental problem, ear pain, facial swelling, nosebleeds, postnasal drip and sinus pressure.    Eyes: Positive for itching. Negative for discharge and redness.   Respiratory: Negative for cough, chest  tightness, shortness of breath and wheezing.    Cardiovascular: Negative for chest pain.   Gastrointestinal: Negative for diarrhea, nausea and vomiting.   Musculoskeletal: Negative for arthralgias, joint swelling and myalgias.   Skin: Negative for rash.   Neurological: Negative for headaches.   Hematological: Negative for adenopathy.   Psychiatric/Behavioral: Negative for behavioral problems and self-injury.         Current Outpatient Medications:      calcium polycarbophil (FIBERCON) 625 MG tablet, Take 2 tablets by mouth daily, Disp: , Rfl:      cetirizine (ZYRTEC) 10 MG tablet, , Disp: , Rfl:      Docosahexaenoic Acid (DHA PO), , Disp: , Rfl:      Prenatal Vit-Fe Fumarate-FA (PRENATAL PO), Take by mouth daily, Disp: , Rfl:      probiotic CAPS, , Disp: , Rfl:   No Known Allergies      EXAM:   /82   Pulse 93   Wt 81.3 kg (179 lb 3.2 oz)   LMP 04/13/2023 (Approximate)   SpO2 95%   BMI 31.74 kg/m      Physical Exam     Constitutional:       General: She is not in acute distress.     Appearance: Normal appearance. She is not ill-appearing.   HENT:      Head: Normocephalic and atraumatic.      Nose: Nose normal. No congestion or rhinorrhea.      Mouth/Throat:      Mouth: Mucous membranes are moist.      Pharynx: Oropharynx is clear. No posterior oropharyngeal erythema.   Eyes:      General:         Right eye: No discharge.         Left eye: No discharge.   Cardiovascular:      Rate and Rhythm: Normal rate and regular rhythm.      Heart sounds: Normal heart sounds.   Pulmonary:      Effort: Pulmonary effort is normal.      Breath sounds: Normal breath sounds. No wheezing or rhonchi.   Skin:     General: Skin is warm.      Findings: No erythema or rash.   Neurological:      General: No focal deficit present.      Mental Status: She is alert. Mental status is at baseline.   Psychiatric:         Mood and Affect: Mood normal.         Behavior: Behavior normal.     WORKUP: Skin testing was  negative    ENVIRONMENTAL PERCUTANEOUS SKIN TESTING: ADULT      6/12/2023     3:00 PM   [facility]   Consent Y   Ordering Physician Landen   Interpreting Physician Landen   Testing Technician Macie BARRERA RN   Location Back   Time start:  3:45 PM   Time End:  4:00 PM   Positive Control: Histatrol*ALK 1 mg/ml 3/6   Negative Control: 50% Glycerin 0   Cat Hair*ALK (10,000 BAU/ml) 0   AP Dog Hair/Dander (1:100 w/v) 0   Dust Mite p. 30,000 AU/ml 3/5   Dust Mite f. (30,000 AU/ml) 0   Enrrique (W/F in millimeters) 0   David [place] (100,000 BAU/mL) 0   Red Cedar (W/F in millimeters) 0   Maple/[place] (W/F in millimeters) 0   Hackberry (W/F in millimeters) 0   [place] (W/F in millimeters) 0   [place] *ALK (W/F in millimeters) 0   American Elm (W/F in millimeters) 0   Gordon (W/F in millimeters) 0   Black [place] (W/F in millimeters) 0   Birch Mix (W/F in millimeters) 0   [place] (W/F in millimeters) 0   Oak (W/F in millimeters) 0   Cocklebur (W/F in millimeters) 0   [place] (W/F in millimeters) 0   White Van (W/F in millimeters) 0   Careless (W/F in millimeters) 0   Nettle (W/F in millimeters) 0   English Plantain (W/F in millimeters) 0   Kochia (W/F in millimeters) 0   Lamb's Quarter (W/F in millimeters) 0   Marshelder (W/F in millimeters) 0   Ragweed Mix* ALK (W/F in millimeters) 0   Russian Thistle (W/F in millimeters) 0   Sagebrush/Mugwort (W/F in millimeters) 0   Sheep Sorrel (W/F in millimeters) 0   Feather Mix* ALK (W/F in millimeters) 0   Penicillium Mix (1:10 w/v) 0   Curvularia spicifera (1:10 w/v) 0   Epicoccum (1:10 w/v) 0   Aspergillus fumigatus (1:10 w/v): 0   Alternaria tenius (1:10 w/v) 0   H. Cladosporium (1:10 w/v) 0   Phoma herbarum (1:10 w/v) 0      After discussing the risks and benefits of skin testing she verbalized understanding and provided verbal consent to proceed.      ASSESSMENT/PLAN:  Vianca ODONNELL Kenanalda is a 38 year old female seen today for environmental allergy concerns.  Her  history is classic for environmental allergens however skin testing was negative.  We will check blood testing also.    1. Skin test was borderline positive to dust mite.  We will check labs for a few additional allergens, including dust mites.  2. May try Xyzal 5 mg to see if there is any more effective than Zyrtec and Claritin.  3. Pataday eyedrops could continue as needed for itchy eyes.  4. If you want to try a nasal steroid, Flonase Sensimist could be used.  5. If allergens are definitively positive on blood testing may consider allergy shots.    Follow-up as needed      Thank you for allowing me to participate in the care of Vianca BELLE Graysonalda.      I spent 35 minutes on the date of the encounter doing chart review, history and exam, documentation and further coordination as noted above exclusive of separately reported interpretations    Bartolo Schuster MD  Allergy/Immunology  [facility]

## 2023-06-12 NOTE — LETTER
6/12/2023         RE: Vianca Valle  [address] [place] [address]        Dear Colleague,    Thank you for referring your patient, Vianca Valle, to the [facility] SPECIAL[facility]. Please see a copy of my visit note below.    Vianca Valle was seen in the Allergy Clinic at [facility].    Vianca Valle is a 38 year old female being seen today at the request of Radha Rodrigez MD [facility] in consultation for Seasonal allergic rhinitis.    She has symptoms of sneezing, nasal congestion, postnasal drainage, sinus headaches, and itchy eyes which are all year-round.  She has tried Pataday, Zyrtec and Claritin with minimal benefit.  This has been an ongoing problem for several years.  She also also had problems with Tylenol on a couple different occasions.  She had hives as a child and the other time she had itchy skin as an adult.  However 95% of the time she takes Tylenol she has no issues.    When she drinks dairy products she will have increased phlegm and postnasal drainage.    On 1 occasion she had severe eye swelling at work which was itchy without an obvious cause.  Benadryl did not provide benefit.    Patient supplied answers from flow sheet for:  Allergy and Asthma Intake Questionnaire.  Allergy and Asthma Questionnaire Social History  Family Doctor: : (P) None  Did He or She suggest you see us?: (P) Yes  Clinic:: (P) [place]  Check the reasons for your appointment: (P) Nasal or Sinus Symptoms, Eye Symptoms, Eczema (red, itchy, scaly or inflamed skin), Food Allergy  Tell us about your home: (P) House, Feather pillow, Bedroom Carpet, in country, woods or near lake, Forced air heat, Wood-burning stove or fire place, Air conditioning, Damp Basement, Mold Growth  Job:: (P) Medical laboratory scientist    Allergy Testing  Have you been Tested for Allergies? : (P) No    Nasal and Eye Symptoms   Check any nasal or  sinus symptoms you have: : (P) Runny Nose, Sneezing, Stuffy head or nose, Drainage down throat, Sinus headaches  Have you had Nasal polyps?: (P) No  Have you had sinus surgery?: (P) No  Have you had sinus infections?: (P) No  Have you used any pills for symptoms?: (P) Yes  Names:: (P) Zyrtec, claritin  Have you used any eye drops: (P) Yes  Names:: (P) Pataday  When do nasal, sinus or eye symptoms occur?: (P) Spring, Summer, Fall, Morning, Winter, Evening, At work or school, Outdoors, Indoors   What makes nasal, sinus or eye symptoms worse?: (P) Dust, Temperature changes, Raking leaves, Scents, perfumes      Do you eat any of the foods listed below?  Eggs:: (P) Yes  Milk:: (P) Yes  Wheat:: (P) Yes  Soy:: (P) Yes  Peanuts:: (P) Yes  Fish:: (P) Yes  Shellfish:: (P) Yes        Past Medical History:   Diagnosis Date     Allergic rhinitis      Depressive disorder      Diabetes (H)     GDM diet     Hematuria 2017     Ruptured ear drum, right 2016     Urinary tract infection      Varicella      Venereal warts      Family History   Problem Relation Age of Onset     Thyroid Disease Maternal Grandmother      C.A.D. Maternal Grandfather      Diabetes Paternal Grandmother      Past Surgical History:   Procedure Laterality Date      SECTION N/A 2019    Procedure:  SECTION;  Surgeon: Dotty España MD;  Location: [facility] L+D      SECTION N/A 2021    Procedure:  SECTION;  Surgeon: Edda Mcneal DO;  Location: [facility] L+D     GYN SURGERY       TONSILLECTOMY      2002       ENVIRONMENTAL HISTORY:   Pets inside the house include None.  Do you smoke cigarettes or other recreational drugs? No There is/are 0 smokers living in the house. The house does have a damp basement.     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 She lives with her  and 3 kids.      Review of Systems   Constitutional: Negative for activity change, chills and fever.   HENT:  Positive for congestion, rhinorrhea and sneezing. Negative for dental problem, ear pain, facial swelling, nosebleeds, postnasal drip and sinus pressure.    Eyes: Positive for itching. Negative for discharge and redness.   Respiratory: Negative for cough, chest tightness, shortness of breath and wheezing.    Cardiovascular: Negative for chest pain.   Gastrointestinal: Negative for diarrhea, nausea and vomiting.   Musculoskeletal: Negative for arthralgias, joint swelling and myalgias.   Skin: Negative for rash.   Neurological: Negative for headaches.   Hematological: Negative for adenopathy.   Psychiatric/Behavioral: Negative for behavioral problems and self-injury.         Current Outpatient Medications:      calcium polycarbophil (FIBERCON) 625 MG tablet, Take 2 tablets by mouth daily, Disp: , Rfl:      cetirizine (ZYRTEC) 10 MG tablet, , Disp: , Rfl:      Docosahexaenoic Acid (DHA PO), , Disp: , Rfl:      Prenatal Vit-Fe Fumarate-FA (PRENATAL PO), Take by mouth daily, Disp: , Rfl:      probiotic CAPS, , Disp: , Rfl:   No Known Allergies      EXAM:   /82   Pulse 93   Wt 81.3 kg (179 lb 3.2 oz)   LMP 04/13/2023 (Approximate)   SpO2 95%   BMI 31.74 kg/m      Physical Exam     Constitutional:       General: She is not in acute distress.     Appearance: Normal appearance. She is not ill-appearing.   HENT:      Head: Normocephalic and atraumatic.      Nose: Nose normal. No congestion or rhinorrhea.      Mouth/Throat:      Mouth: Mucous membranes are moist.      Pharynx: Oropharynx is clear. No posterior oropharyngeal erythema.   Eyes:      General:         Right eye: No discharge.         Left eye: No discharge.   Cardiovascular:      Rate and Rhythm: Normal rate and regular rhythm.      Heart sounds: Normal heart sounds.   Pulmonary:      Effort: Pulmonary effort is normal.      Breath sounds: Normal breath sounds. No wheezing or rhonchi.   Skin:     General: Skin is warm.      Findings: No erythema or rash.    Neurological:      General: No focal deficit present.      Mental Status: She is alert. Mental status is at baseline.   Psychiatric:         Mood and Affect: Mood normal.         Behavior: Behavior normal.     WORKUP: Skin testing was negative    ENVIRONMENTAL PERCUTANEOUS SKIN TESTING: ADULT      6/12/2023     3:00 PM   [facility]   Consent Y   Ordering Physician Lanedn   Interpreting Physician Landen   Testing Technician Macie BARRERA RN   Location Back   Time start:  3:45 PM   Time End:  4:00 PM   Positive Control: Histatrol*ALK 1 mg/ml 3/6   Negative Control: 50% Glycerin 0   Cat Hair*ALK (10,000 BAU/ml) 0   AP Dog Hair/Dander (1:100 w/v) 0   Dust Mite p. 30,000 AU/ml 3/5   Dust Mite f. (30,000 AU/ml) 0   Enrrique (W/F in millimeters) 0   David Grass (100,000 BAU/mL) 0   Red Cedar (W/F in millimeters) 0   Maple/Pinewood (W/F in millimeters) 0   Hackberry (W/F in millimeters) 0   [place] (W/F in millimeters) 0   [place] *ALK (W/F in millimeters) 0   American Elm (W/F in millimeters) 0   Kane (W/F in millimeters) 0   Black [place] (W/F in millimeters) 0   Birch Mix (W/F in millimeters) 0   Cl[place] (W/F in millimeters) 0   Oak (W/F in millimeters) 0   Cocklebur (W/F in millimeters) 0   [place] (W/F in millimeters) 0   White Van (W/F in millimeters) 0   Careless (W/F in millimeters) 0   Nettle (W/F in millimeters) 0   English Plantain (W/F in millimeters) 0   Kochia (W/F in millimeters) 0   Lamb's Quarter (W/F in millimeters) 0   Marshelder (W/F in millimeters) 0   Ragweed Mix* ALK (W/F in millimeters) 0   Russian Thistle (W/F in millimeters) 0   Sagebrush/Mugwort (W/F in millimeters) 0   Sheep Sorrel (W/F in millimeters) 0   Feather Mix* ALK (W/F in millimeters) 0   Penicillium Mix (1:10 w/v) 0   Curvularia spicifera (1:10 w/v) 0   Epicoccum (1:10 w/v) 0   Aspergillus fumigatus (1:10 w/v): 0   Alternaria tenius (1:10 w/v) 0   H. Cladosporium (1:10 w/v) 0   Phoma herbarum (1:10 w/v) 0      After  discussing the risks and benefits of skin testing she verbalized understanding and provided verbal consent to proceed.      ASSESSMENT/PLAN:  Vianca Valle is a 38 year old female seen today for environmental allergy concerns.  Her history is classic for environmental allergens however skin testing was negative.  We will check blood testing also.    1. Skin test was borderline positive to dust mite.  We will check labs for a few additional allergens, including dust mites.  2. May try Xyzal 5 mg to see if there is any more effective than Zyrtec and Claritin.  3. Pataday eyedrops could continue as needed for itchy eyes.  4. If you want to try a nasal steroid, Flonase Sensimist could be used.  5. If allergens are definitively positive on blood testing may consider allergy shots.    Follow-up as needed      Thank you for allowing me to participate in the care of Vianca Valle.      I spent 35 minutes on the date of the encounter doing chart review, history and exam, documentation and further coordination as noted above exclusive of separately reported interpretations    Bartolo Schuster MD  Allergy/Immunology  [facility]      Per provider verbal order, placed Adult Environmental Panel scratch test.   Once panels were placed, patient was monitored for 15 minutes in clinic.  Provider read test after 15 minutes.  Pt tolerated procedure well.  All questions and concerns were addressed at office visit.     DRAKE GarciaN, RN                  Again, thank you for allowing me to participate in the care of your patient.        Sincerely,        Bartolo Schuster MD

## 2023-06-16 ENCOUNTER — LAB (OUTPATIENT)
Dept: LAB | Facility: CLINIC | Age: 38
End: 2023-06-16
Payer: COMMERCIAL

## 2023-06-16 DIAGNOSIS — R06.7 SNEEZING: ICD-10-CM

## 2023-06-16 DIAGNOSIS — R09.82 POST-NASAL DRAINAGE: ICD-10-CM

## 2023-06-16 PROCEDURE — 86003 ALLG SPEC IGE CRUDE XTRC EA: CPT

## 2023-06-16 PROCEDURE — 36415 COLL VENOUS BLD VENIPUNCTURE: CPT

## 2023-06-18 LAB
COMMON RAGWEED IGE QN: <0.1 KU(A)/L
D FARINAE IGE QN: <0.1 KU(A)/L
D PTERONYSS IGE QN: <0.1 KU(A)/L
SILVER BIRCH IGE QN: <0.1 KU(A)/L
TIMOTHY IGE QN: <0.1 KU(A)/L

## 2023-09-15 NOTE — LETTER
"2018       RE: Vianca Dumas  [address]  [place] [address]     Dear Colleague,    Thank you for referring your patient, Vianca Dumas, to the [facility] at [facility]. Please see a copy of my visit note below.      Progress Note    SUBJECTIVE:  Vianca Dumas is an 32 year old, , who requests an Annual Preventive Exam.     Concerns today include:  1) Trying to conceive: stopped taking birth control pills in December, she has had a period since stopping. Is using ovulation predictor kits and ovulated today, on Day 15 of her 25 day cycle. Is taking a prenatal vitamin daily.       Menstrual History:  Menstrual History 2017   LAST MENSTRUAL PERIOD 8/10/2017 2018 -   Menarche age - - 11   Period Cycle (Days) - - 25   Period Duration (Days) - - 3   Method of Contraception - - None   Period Pattern - - Regular   Menstrual Flow - - Moderate   Menstrual Control - - -   Dysmenorrhea - - Moderate   PMS Symptoms - - Cramping;Mood Changes   Reviewed Today - - Yes       Last    Lab Results   Component Value Date    PAP NIL 2017     Last   Lab Results   Component Value Date    HPV16 Negative 2017     Last   Lab Results   Component Value Date    HPV18 Negative 2017     Last   Lab Results   Component Value Date    HRHPV Negative 2017       Mammogram current: n/a  Last Mammogram: n/a    Last Colonoscopy: n/a  Lipids: \"done many years ago\"  HgbA1c: 5.5 (wnl) 2017  Vitamin D was low in ; has not been rechecked since then.     Social History: Works as a  in a lab. Drinks 2-3 alcohol drinks per week.     Exercise: None     Diet: eats a healthy diet, 3-4 servings of vegetables and fruit, 2 servings of calcium per day     Sexual Hx: Denies any sexual concerns; declines STD testing, in a monogamous relationship    Patient is currently following with urology for evaluation " Paco is calling stating that she started back on plaquenil this past Monday and since then she is having a lot of pressure in her left eye and fb sensation. Please call Paco back to advise    Thank you     of hematuria and dysuria; no new change in her symptoms since seen by urology.  Due for follow-up around 2018.     HISTORY:    Current Outpatient Prescriptions on File Prior to Visit:  loratadine (CLARITIN) 10 MG tablet Take 10 mg by mouth daily Reported on 2017   Pseudoephedrine HCl (SUDAFED PO) Reported on 2017   HERBALS      No current facility-administered medications on file prior to visit.   No Known Allergies  Immunization History   Administered Date(s) Administered     HepB 1997, 1998, 2002     Influenza (IIV3) PF 2013     Meningococcal (Menomune ) 2003     OPV, trivalent, live 1984, 1984, 1985, 10/06/1988     TDAP Vaccine (Boostrix) 2016     Tdap (Adacel,Boostrix) 2006       Obstetric History       T0      L0     SAB0   TAB0   Ectopic0   Multiple0   Live Births0      Past Medical History:   Diagnosis Date     Allergic rhinitis      Ruptured ear drum, right 2016     Past Surgical History:   Procedure Laterality Date     TONSILLECTOMY      2002     Family History   Problem Relation Age of Onset     Thyroid Disease Maternal Grandmother      C.A.D. Maternal Grandfather      Social History     Social History     Marital status:      Spouse name: N/A     Number of children: N/A     Years of education: college     Occupational History     Student      lab, [employer]     Social History Main Topics     Smoking status: Light Tobacco Smoker     Packs/day: 0.50     Years: 10.00     Types: Cigarettes     Start date: 2006     Smokeless tobacco: Never Used     Alcohol use 0.6 - 1.8 oz/week     Drug use: No     Sexual activity: Yes     Partners: Male     Birth control/ protection: None      Comment: monogamous relationship since May 2013       Social History Narrative    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     How much exercise per week? Not much daily    How much calcium per day?  2-3 servings       How much caffeine per day? 1 cup coffee    How much vitamin D per day?   occ supplements    Do you/your family wear seatbelts?  Yes    Do you/your family use safety helmets? N/A    Do you/your family use sunscreen? Yes    Do you/your family keep firearms in the home? No    Do you/your family have a smoke detector(s)? Yes        Do you feel safe in your home? Yes    Has anyone ever touched you in an unwanted manner? No     Explain         December 9, 2014 Ravinder Kuhn LPN    Reviewed teodoro lpn 1-4-2017                Review of Systems     Constitutional:  Negative for fever, chills, weight loss, weight gain, fatigue, decreased appetite, night sweats, recent stressors, height gain, height loss, post-operative complications, incisional pain, hallucinations, increased energy, hyperactivity and confused.   HENT:  Negative for ear pain, hearing loss, tinnitus, nosebleeds, trouble swallowing, hoarse voice, mouth sores, sore throat, ear discharge, tooth pain, gum tenderness, taste disturbance, smell disturbance, hearing aid, bleeding gums, dry mouth, sinus pain, sinus congestion and neck mass.    Eyes:  Negative for double vision, pain, redness, eye pain, decreased vision, eye watering, eye bulging, eye dryness, flashing lights, spots, floaters, strabismus, tunnel vision, jaundice and eye irritation.   Respiratory:   Negative for cough, hemoptysis, sputum production, shortness of breath, wheezing, sleep disturbances due to breathing, snores loudly, respiratory pain, dyspnea on exertion, cough disturbing sleep and postural dyspnea.    Cardiovascular:  Negative for chest pain, dyspnea on exertion, palpitations, orthopnea, claudication, leg swelling, fingers/toes turn blue, hypertension, hypotension, syncope, history of heart murmur, chest pain on exertion, chest pain at rest, pacemaker, few scattered varicosities, leg pain, sleep disturbances due to breathing, tachycardia, light-headedness, exercise  "intolerance and edema.   Gastrointestinal:  Negative for heartburn, nausea, vomiting, abdominal pain, diarrhea, constipation, blood in stool, melena, rectal pain, bloating, hemorrhoids, bowel incontinence, jaundice, rectal bleeding, coffee ground emesis and change in stool.   Genitourinary:  Positive for dysuria. Negative for bladder incontinence, urgency, hematuria, flank pain, vaginal discharge, difficulty urinating, genital sores, dyspareunia, decreased libido, nocturia, voiding less frequently, arousal difficulty, abnormal vaginal bleeding, excessive menstruation, menstrual changes, hot flashes, vaginal dryness and postmenopausal bleeding.   Musculoskeletal:  Negative for myalgias, back pain, joint swelling, arthralgias, stiffness, muscle cramps, neck pain, bone pain, muscle weakness and fracture.   Skin:  Positive for nail changes and skin thickening. Negative for itching, poor wound healing, rash, hair changes, skin changes, acne, warts, poor wound healing, scarring, flaky skin, Raynaud's phenomenon and sensitivity to sunlight.   Neurological:  Negative for dizziness, tingling, tremors, speech change, seizures, loss of consciousness, weakness, light-headedness, numbness, headaches, disturbances in coordination, extremity numbness, memory loss, difficulty walking and paralysis.   Endo/Heme:  Negative for anemia, swollen glands and bruises/bleeds easily.   Psychiatric/Behavioral:  Negative for depression, hallucinations, memory loss, decreased concentration, mood swings and panic attacks.    Breast:  Negative for breast discharge, breast mass, breast pain and nipple retraction.   Endocrine:  Negative for altered temperature regulation, polyphagia, polydipsia, unwanted hair growth and change in facial hair.    PHQ-9 SCORE 1/4/2017   Total Score 1     MARY-7 SCORE 2/1/2018   Total Score 1 (minimal anxiety)   Total Score 1       EXAM:  Blood pressure 117/77, pulse 76, height 1.6 m (5' 3\"), weight 77 kg (169 lb 12.8 " oz), last menstrual period 01/19/2018, not currently breastfeeding. Body mass index is 30.08 kg/(m^2).  General - pleasant female in no acute distress.  Skin - no suspicious lesions or rashes  EENT-  PERRLA, euthyroid with out palpable nodules  Neck - supple without lymphadenopathy.  Lungs - clear to auscultation bilaterally.  Heart - regular rate and rhythm without murmur.  Abdomen - soft, nontender, nondistended, no masses or organomegaly noted.  Musculoskeletal - no gross deformities.  Neurological - normal strength, sensation, and mental status.    Breast Exam:  Breast: Without visible skin changes. No dimpling or lesions seen.   Breasts supple, non-tender with palpation, no dominant mass, nodularity, or nipple discharge noted bilaterally. Axillary nodes negative.      Pelvic Exam: patient declined       ASSESSMENT:  Encounter Diagnoses   Name Primary?     Visit for preventive health examination Yes     Encounter for preconception consultation         PLAN:   Preconception counseling provided, patient to continue prenatal vitamin and fish oil  Discussed safety of medications in pregnancy   Counseled Vianca on normal length of time to conceive   Patient to return to clinic if she has not conceived in one year     Encouraged flu vaccine, patient declined   Next PAP smear due 1/2022  Consider lipid and vitamin D screen at next annual exam as patient declined today     Additional teaching done at this visit regarding:   self breast exam, exercise, preconception, smoking cessation and weight/diet.    Continue care with Urology for evaluation of hematuria and dysuria. May try AZO urinary relief.     Return to clinic in one year.  Follow-up as needed.    I, Danii Tate, completed the PFSH and ROS. I then acted as a scribe for AMRITA Garcia, for the remainder of the visit.  JUDD Bee Student     I agree with the PFSH and ROS as completed by the AMRITA Student, except for changes made by me.   The remainder of the encounter was performed by me and scribed by the AMRITA Student.  The scribed note accurately reflects my personal services and decisions made by me.    Lorena Mijares, DNP, APRN, SONYANP

## 2023-10-11 ENCOUNTER — NURSE TRIAGE (OUTPATIENT)
Dept: OBGYN | Facility: CLINIC | Age: 38
End: 2023-10-11
Payer: COMMERCIAL

## 2023-10-11 ENCOUNTER — MYC MEDICAL ADVICE (OUTPATIENT)
Dept: OBGYN | Facility: CLINIC | Age: 38
End: 2023-10-11

## 2023-10-11 DIAGNOSIS — U07.1 INFECTION DUE TO 2019 NOVEL CORONAVIRUS: Primary | ICD-10-CM

## 2023-10-11 NOTE — TELEPHONE ENCOUNTER
Patient calling stating she tested positive for Covid and wants to discuss treatment.  Discussed with her the side effects of Paxlovid and due to her young age, may not qualify for treatment.    Stated symptoms since Saturday.  Tested negative until yesterday    Congested and fatigue are the main symptoms  Fever and chills Monday, but those are resolved.   Denied any difficulty breathing or chest pain.

## 2023-10-11 NOTE — TELEPHONE ENCOUNTER
RN COVID TREATMENT VISIT  10/11/23      The patient has been triaged and does not require a higher level of care.    Vianca Valle  38 year old  Current weight? 179 lbs    Has the patient been seen by a primary care provider at an [facility] or [facility] within the past two years? Yes.   Have you been in close proximity to/do you have a known exposure to a person with a confirmed case of influenza? No.     General treatment eligibility:  Date of positive COVID test (PCR or at home)?  10/10/2023    Are you or have you been hospitalized for this COVID-19 infection? No.   Have you received monoclonal antibodies or antiviral treatment for COVID-19 since this positive test? No.   Do you have any of the following conditions that place you at risk of being very sick from COVID-19?   - Overweight or Obesity (BMI >85th percentile or BMI 25 or higher)  Yes, patient has at least one high risk condition as noted above.     Current COVID symptoms:   - cough  - new loss of taste or smell  - congestion or runny nose  Yes. Patient has at least one symptom as selected.     How many days since symptoms started? 5 days or less. Established patient, 12 years or older weighing at least 88.2 lbs, who has symptoms that started in the past 5 days, has not been hospitalized nor received treatment already, and is at risk for being very sick from COVID-19.     Treatment eligibility by RN:  Are you currently pregnant or nursing? No  Do you have a clinically significant hypersensitivity to nirmatrelvir or ritonavir, or toxic epidermal necrolysis (TEN) or Capellan-Enrrique Syndrome? No  Do you have a history of hepatitis, any hepatic impairment on the Problem List (such as Child-Loredo Class C, cirrhosis, fatty liver disease, alcoholic liver disease), or was the last liver lab (hepatic panel, ALT, AST, ALK Phos, bilirubin) elevated in the past 6 months? No  Do you have any history of severe renal impairment (eGFR <  30mL/min)? No    Is patient eligible to continue? Yes, patient meets all eligibility requirements for the RN COVID treatment (as denoted by all no responses above).     Current Outpatient Medications   Medication Sig Dispense Refill    calcium polycarbophil (FIBERCON) 625 MG tablet Take 2 tablets by mouth daily      cetirizine (ZYRTEC) 10 MG tablet  (Patient not taking: Reported on 6/12/2023)      Docosahexaenoic Acid (DHA PO)       Prenatal Vit-Fe Fumarate-FA (PRENATAL PO) Take by mouth daily      probiotic CAPS          Medications from List 1 of the standing order (on medications that exclude the use of Paxlovid) that patient is taking: NONE. Is patient taking [place]ita's Wort? No  Is patient taking [place]'s Wort or any meds from List 1? No.   Medications from List 2 of the standing order (on meds that provider needs to adjust) that patient is taking: NONE. Is patient on any of the meds from List 2? No.   Medications from List 3 of standing order (on meds that a RN needs to adjust) that patient is taking: NONE. Is patient on any meds from List 3? No.     Paxlovid has an approximate 90% reduction in hospitalization. Paxlovid can possibly cause altered sense of taste, diarrhea (loose, watery stools), high blood pressure, muscle aches.     Would patient like a Paxlovid prescription?   Yes.   Lab Results   Component Value Date    GFRESTIMATED 87 10/24/2013       Was last eGFR reduced? No, eGFR 60 or greater/ No Result on record. Patient can receive the normal renal function dose. Paxlovid Rx sent to [facility]   [facility]    Temporary change to home medications: None    All medication adjustments (holds, etc) were discussed with the patient and patient was asked to repeat back (teachback) their med adjustment.  Did patient understand med adjustment? No medication adjustments needed.         Reviewed the following instructions with the patient:    Paxlovid (nimatrelvir and ritonavir)    How it works  Two medicines  (nirmatrelvir and ritonavir) are taken together. They stop the virus from growing. Less amount of virus is easier for your body to fight.    How to take  Medicine comes in a daily container with both medicine tablets. Take by mouth twice daily (once in the morning, once at night) for 5 days.  The number of tablets to take varies by patient.  Don't chew or break capsules. Swallow whole.    When to take  Take as soon as possible after positive COVID-19 test result, and within 5 days of your first symptoms.    Possible side effects  Can cause altered sense of taste, diarrhea (loose, watery stools), high blood pressure, muscle aches.    Aiden uNñez RN       Reason for Disposition   HIGH RISK patient (e.g., weak immune system, age > 64 years, obesity with BMI of 30 or higher, pregnant, chronic lung disease or other chronic medical condition) and COVID symptoms (e.g., cough, fever)  (Exceptions: Already seen by doctor or NP/PA and no new or worsening symptoms.)    Additional Information   Negative: SEVERE difficulty breathing (e.g., struggling for each breath, speaks in single words)   Negative: Difficult to awaken or acting confused (e.g., disoriented, slurred speech)   Negative: Bluish (or gray) lips or face now   Negative: Shock suspected (e.g., cold/pale/clammy skin, too weak to stand, low BP, rapid pulse)   Negative: Sounds like a life-threatening emergency to the triager   Negative: Diagnosed or suspected COVID-19 and symptoms lasting 3 or more weeks   Negative: COVID-19 exposure and no symptoms   Negative: COVID-19 vaccine reaction suspected (e.g., fever, headache, muscle aches) occurring 1 to 3 days after getting vaccine   Negative: COVID-19 vaccine, questions about   Negative: Lives with someone known to have influenza (flu test positive) and flu-like symptoms (e.g., cough, runny nose, sore throat, SOB; with or without fever)   Negative: Possible COVID-19 symptoms and triager concerned about severity of symptoms  or other causes   Negative: COVID-19 and breastfeeding, questions about   Negative: SEVERE or constant chest pain or pressure  (Exception: Mild central chest pain, present only when coughing.)   Negative: MODERATE difficulty breathing (e.g., speaks in phrases, SOB even at rest, pulse 100-120)   Negative: Headache and stiff neck (can't touch chin to chest)   Negative: Oxygen level (e.g., pulse oximetry) 90% or lower   Negative: Chest pain or pressure  (Exception: MILD central chest pain, present only when coughing.)   Negative: Drinking very little and dehydration suspected (e.g., no urine > 12 hours, very dry mouth, very lightheaded)   Negative: Patient sounds very sick or weak to the triager   Negative: MILD difficulty breathing (e.g., minimal/no SOB at rest, SOB with walking, pulse <100)   Negative: Fever > 103 F (39.4 C)   Negative: Fever > 101 F (38.3 C) and over 60 years of age   Negative: Fever > 100.0 F (37.8 C) and bedridden (e.g., CVA, chronic illness, recovering from surgery)    Protocols used: Coronavirus (COVID-19) Diagnosed or [id number]

## 2023-11-10 ENCOUNTER — TELEPHONE (OUTPATIENT)
Dept: OBGYN | Facility: CLINIC | Age: 38
End: 2023-11-10
Payer: COMMERCIAL

## 2023-11-10 NOTE — TELEPHONE ENCOUNTER
Form received from: Via fax for pt; Vianca Valle     Form requesting following info/need: STD forms for [employer]     KEILY needed?: included with form     Location of form: basket above Lima's desk     When completed the route for return: please fax to number on form

## 2023-11-13 ENCOUNTER — MYC MEDICAL ADVICE (OUTPATIENT)
Dept: OBGYN | Facility: CLINIC | Age: 38
End: 2023-11-13
Payer: COMMERCIAL

## 2023-11-14 NOTE — TELEPHONE ENCOUNTER
I spoke with patient.  She was  out of work with Covid the required 7 days but was still symptomatic for 4 more days so work advised to stay off.  HR advised her to apply for short term disability.   Dr. Arana sent Paxlovid rx on 10/11/2023.  See Forms Telephone encounter  Renee Reyes CMA

## 2023-11-14 NOTE — TELEPHONE ENCOUNTER
I spoke with patient.  She was  out of work with Covid the required 7 days but was still symptomatic for 4 more days so work advised to stay off.  HR advised her to apply for short term disability.   Dr. Arana sent Paxlovid rx on 10/11/2023.  Renee Reyes CMA

## 2023-12-14 NOTE — NURSING NOTE
Medication Atorvastatin 10mg 1 tablet po every evening  Last office visit date: 3/30/23 with Dr Cano  Medication Refill Protocol Failed.  Protocol approved due to: other (documentation required).    The patient was last seen by Dr Cano who was a physician in Family Practice but has since relocated and the patient has a future appt with Dr Wong on 3/28/23    NST Performed due to FGR.   reviewed efm tracing. See NST/BPP Doc Flowsheet tab.

## 2024-03-12 ENCOUNTER — ANCILLARY PROCEDURE (OUTPATIENT)
Dept: MAMMOGRAPHY | Facility: CLINIC | Age: 39
End: 2024-03-12
Attending: OBSTETRICS & GYNECOLOGY
Payer: COMMERCIAL

## 2024-03-12 DIAGNOSIS — Z12.31 VISIT FOR SCREENING MAMMOGRAM: ICD-10-CM

## 2024-03-12 PROCEDURE — 77063 BREAST TOMOSYNTHESIS BI: CPT | Mod: TC | Performed by: RADIOLOGY

## 2024-03-12 PROCEDURE — 77067 SCR MAMMO BI INCL CAD: CPT | Mod: TC | Performed by: RADIOLOGY

## 2024-03-13 NOTE — PROGRESS NOTES
HPI:  Patient presents for an annual eye exam. Saw an allergist - no allergies.     Social history: Has 2 year old twins and 5 year old (noted in 2024).       Pertinent Medical History:  Allergic rhinitis - saw an allergist and has no allergies.   Gestational diabetes    Ocular History:  Allergic conjunctivitis, both eyes.     Eye Medications:  Pataday both eyes.     Assessment and Plan:    #   Myopia/Presbyopia, both eyes.   Progressives vs distance only vs near only. Adaptation needed.   Glasses prescription given.     #   Meibomian Gland Dysfunction, both eyes.   Symptoms of dry eyes include blurry vision, eye pain, grittiness, burning, redness, eye irritation, and tearing. The goal is not to eliminate, but to improve symptoms.   Preservative free artificial tears 4 times daily both eyes. Refresh or Systane. Can use up to every 2 hours both eyes as needed.   Warm compress (with dry eye mask), 10 minutes, at bedtime, both eyes.      #   Blepharitis, both eyes.   Ocusoft lid wipes, 2 times daily, both eyes.             Patient consented to a dilated eye exam:    Yes. Side effects discussed.  Mood/affect: very pleasant.     Medical History:  Past Medical History:   Diagnosis Date    Allergic rhinitis     Depressive disorder     Diabetes (H)     GDM diet    Hematuria 2017    Ruptured ear drum, right 2016    Urinary tract infection     Varicella     Venereal warts        Medications:  Current Outpatient Medications   Medication Sig Dispense Refill    calcium polycarbophil (FIBERCON) 625 MG tablet Take 2 tablets by mouth daily      cetirizine (ZYRTEC) 10 MG tablet  (Patient not taking: Reported on 6/12/2023)      Docosahexaenoic Acid (DHA PO)       Prenatal Vit-Fe Fumarate-FA (PRENATAL PO) Take by mouth daily      probiotic CAPS      Complete documentation of historical and exam elements from today's encounter can be found in the full encounter summary report (not reduplicated in this progress note). I personally  obtained the chief complaint(s) and history of present illness.  I confirmed and edited as necessary the review of systems, past medical/surgical history, family history, social history, and examination findings as documented by others; and I examined the patient myself. I personally reviewed the relevant tests, images, and reports as documented above. I formulated and edited as necessary the assessment and plan and discussed the findings and management plan with the patient and family. - [place], LAYNE

## 2024-03-28 ENCOUNTER — PATIENT OUTREACH (OUTPATIENT)
Dept: CARE COORDINATION | Facility: CLINIC | Age: 39
End: 2024-03-28
Payer: COMMERCIAL

## 2024-04-04 ENCOUNTER — OFFICE VISIT (OUTPATIENT)
Dept: OPHTHALMOLOGY | Facility: CLINIC | Age: 39
End: 2024-04-04
Attending: OPTOMETRIST
Payer: COMMERCIAL

## 2024-04-04 DIAGNOSIS — H01.01A ULCERATIVE BLEPHARITIS OF UPPER AND LOWER EYELIDS OF BOTH EYES: ICD-10-CM

## 2024-04-04 DIAGNOSIS — H52.13 MYOPIA OF BOTH EYES: Primary | ICD-10-CM

## 2024-04-04 DIAGNOSIS — H04.123 DRY EYE SYNDROME OF BOTH EYES: ICD-10-CM

## 2024-04-04 DIAGNOSIS — H01.01B ULCERATIVE BLEPHARITIS OF UPPER AND LOWER EYELIDS OF BOTH EYES: ICD-10-CM

## 2024-04-04 PROCEDURE — 92015 DETERMINE REFRACTIVE STATE: CPT

## 2024-04-04 PROCEDURE — 92004 COMPRE OPH EXAM NEW PT 1/>: CPT | Performed by: OPTOMETRIST

## 2024-04-04 PROCEDURE — 99213 OFFICE O/P EST LOW 20 MIN: CPT | Performed by: OPTOMETRIST

## 2024-04-04 RX ORDER — EYELID CLEANSER COMBINATION 13
1 PADS, MEDICATED (EA) TOPICAL 2 TIMES DAILY
Qty: 60 EACH | Refills: 11 | Status: SHIPPED | OUTPATIENT
Start: 2024-04-04 | End: 2024-05-29

## 2024-04-04 RX ORDER — CARBOXYMETHYLCELLULOSE SODIUM 5 MG/ML
1 SOLUTION/ DROPS OPHTHALMIC 4 TIMES DAILY
Qty: 400 EACH | Refills: 11 | Status: SHIPPED | OUTPATIENT
Start: 2024-04-04 | End: 2024-05-29

## 2024-04-04 ASSESSMENT — TONOMETRY
IOP_METHOD: APPLANATION
OD_IOP_MMHG: 22
OS_IOP_MMHG: 22
OS_IOP_MMHG: 19
IOP_METHOD: TONOPEN
OD_IOP_MMHG: 19

## 2024-04-04 ASSESSMENT — SLIT LAMP EXAM - LIDS
COMMENTS: BLEPHARITIS, MEIBOMIAN GLAND DYSFUNCTION
COMMENTS: BLEPHARITIS, MEIBOMIAN GLAND DYSFUNCTION

## 2024-04-04 ASSESSMENT — EXTERNAL EXAM - RIGHT EYE: OD_EXAM: NORMAL

## 2024-04-04 ASSESSMENT — CONF VISUAL FIELD
OD_INFERIOR_NASAL_RESTRICTION: 0
OS_INFERIOR_TEMPORAL_RESTRICTION: 0
OD_INFERIOR_TEMPORAL_RESTRICTION: 0
OS_NORMAL: 1
OD_SUPERIOR_NASAL_RESTRICTION: 0
OD_NORMAL: 1
OS_SUPERIOR_TEMPORAL_RESTRICTION: 0
OS_SUPERIOR_NASAL_RESTRICTION: 0
METHOD: COUNTING FINGERS
OS_INFERIOR_NASAL_RESTRICTION: 0
OD_SUPERIOR_TEMPORAL_RESTRICTION: 0

## 2024-04-04 ASSESSMENT — VISUAL ACUITY
OD_SC+: -2
OS_SC+: -2
METHOD: SNELLEN - LINEAR
OD_SC: 20/25
OS_SC: 20/40

## 2024-04-04 ASSESSMENT — REFRACTION_MANIFEST
OD_AXIS: 080
OS_AXIS: 110
OD_CYLINDER: +1.50
OS_SPHERE: -1.00
OS_CYLINDER: +1.50
OD_SPHERE: -1.00

## 2024-04-04 ASSESSMENT — EXTERNAL EXAM - LEFT EYE: OS_EXAM: NORMAL

## 2024-04-04 NOTE — NURSING NOTE
Chief Complaints and History of Present Illnesses   Patient presents with    COMPREHENSIVE EYE EXAM     Chief Complaint(s) and History of Present Illness(es)       COMPREHENSIVE EYE EXAM              Laterality: both eyes    Course: gradually worsening    Associated symptoms: Negative for eye pain, flashes and floaters    Treatments tried: artificial tears              Comments    Vianca Valle is a(n) 39 year old female who presents for a comprehensive exam. Last eye exam was several year(s) ago. Since exam, vision has decreased. Noticing blurriness when transitioning from a microscope to a computer. Uses lubricating drops. No flashes and floaters. No eye pain.     Lab Results       Component                Value               Date                       A1C                      5.5                 04/27/2023                 A1C                      5.5                 08/18/2017              Arash aGmez COT 9:51 AM April 4, 2024

## 2024-05-28 SDOH — HEALTH STABILITY: PHYSICAL HEALTH: ON AVERAGE, HOW MANY MINUTES DO YOU ENGAGE IN EXERCISE AT THIS LEVEL?: 0 MIN

## 2024-05-28 SDOH — HEALTH STABILITY: PHYSICAL HEALTH: ON AVERAGE, HOW MANY DAYS PER WEEK DO YOU ENGAGE IN MODERATE TO STRENUOUS EXERCISE (LIKE A BRISK WALK)?: 0 DAYS

## 2024-05-28 ASSESSMENT — SOCIAL DETERMINANTS OF HEALTH (SDOH): HOW OFTEN DO YOU GET TOGETHER WITH FRIENDS OR RELATIVES?: ONCE A WEEK

## 2024-05-29 ENCOUNTER — OFFICE VISIT (OUTPATIENT)
Dept: PEDIATRICS | Facility: CLINIC | Age: 39
End: 2024-05-29
Payer: COMMERCIAL

## 2024-05-29 ENCOUNTER — MYC MEDICAL ADVICE (OUTPATIENT)
Dept: PEDIATRICS | Facility: CLINIC | Age: 39
End: 2024-05-29

## 2024-05-29 VITALS
HEIGHT: 63 IN | DIASTOLIC BLOOD PRESSURE: 70 MMHG | OXYGEN SATURATION: 98 % | RESPIRATION RATE: 16 BRPM | WEIGHT: 187.9 LBS | BODY MASS INDEX: 33.29 KG/M2 | TEMPERATURE: 98.7 F | HEART RATE: 90 BPM | SYSTOLIC BLOOD PRESSURE: 114 MMHG

## 2024-05-29 DIAGNOSIS — L20.84 INTRINSIC ECZEMA: ICD-10-CM

## 2024-05-29 DIAGNOSIS — L85.8 KERATOSIS PILARIS: ICD-10-CM

## 2024-05-29 DIAGNOSIS — Z00.00 ROUTINE GENERAL MEDICAL EXAMINATION AT A HEALTH CARE FACILITY: Primary | ICD-10-CM

## 2024-05-29 DIAGNOSIS — Z11.59 NEED FOR HEPATITIS C SCREENING TEST: ICD-10-CM

## 2024-05-29 LAB
ALBUMIN SERPL BCG-MCNC: 4.3 G/DL (ref 3.5–5.2)
ALP SERPL-CCNC: 84 U/L (ref 40–150)
ALT SERPL W P-5'-P-CCNC: 17 U/L (ref 0–50)
ANION GAP SERPL CALCULATED.3IONS-SCNC: 10 MMOL/L (ref 7–15)
AST SERPL W P-5'-P-CCNC: 22 U/L (ref 0–45)
BILIRUB SERPL-MCNC: 0.4 MG/DL
BUN SERPL-MCNC: 13 MG/DL (ref 6–20)
CALCIUM SERPL-MCNC: 9.6 MG/DL (ref 8.6–10)
CHLORIDE SERPL-SCNC: 104 MMOL/L (ref 98–107)
CHOLEST SERPL-MCNC: 199 MG/DL
CREAT SERPL-MCNC: 0.78 MG/DL (ref 0.51–0.95)
DEPRECATED HCO3 PLAS-SCNC: 24 MMOL/L (ref 22–29)
EGFRCR SERPLBLD CKD-EPI 2021: >90 ML/MIN/1.73M2
ERYTHROCYTE [DISTWIDTH] IN BLOOD BY AUTOMATED COUNT: 12.5 % (ref 10–15)
FASTING STATUS PATIENT QL REPORTED: NO
FASTING STATUS PATIENT QL REPORTED: NO
GLUCOSE SERPL-MCNC: 113 MG/DL (ref 70–99)
HBA1C MFR BLD: 5.6 % (ref 0–5.6)
HCT VFR BLD AUTO: 42.3 % (ref 35–47)
HDLC SERPL-MCNC: 61 MG/DL
HGB BLD-MCNC: 14 G/DL (ref 11.7–15.7)
LDLC SERPL CALC-MCNC: 121 MG/DL
MCH RBC QN AUTO: 29.3 PG (ref 26.5–33)
MCHC RBC AUTO-ENTMCNC: 33.1 G/DL (ref 31.5–36.5)
MCV RBC AUTO: 89 FL (ref 78–100)
NONHDLC SERPL-MCNC: 138 MG/DL
PLATELET # BLD AUTO: 300 10E3/UL (ref 150–450)
POTASSIUM SERPL-SCNC: 3.9 MMOL/L (ref 3.4–5.3)
PROT SERPL-MCNC: 7 G/DL (ref 6.4–8.3)
RBC # BLD AUTO: 4.78 10E6/UL (ref 3.8–5.2)
SODIUM SERPL-SCNC: 138 MMOL/L (ref 135–145)
TRIGL SERPL-MCNC: 83 MG/DL
VIT D+METAB SERPL-MCNC: 33 NG/ML (ref 20–50)
WBC # BLD AUTO: 9.8 10E3/UL (ref 4–11)

## 2024-05-29 PROCEDURE — 36415 COLL VENOUS BLD VENIPUNCTURE: CPT | Performed by: INTERNAL MEDICINE

## 2024-05-29 PROCEDURE — 91320 SARSCV2 VAC 30MCG TRS-SUC IM: CPT | Performed by: INTERNAL MEDICINE

## 2024-05-29 PROCEDURE — 86803 HEPATITIS C AB TEST: CPT | Performed by: INTERNAL MEDICINE

## 2024-05-29 PROCEDURE — 90480 ADMN SARSCOV2 VAC 1/ONLY CMP: CPT | Performed by: INTERNAL MEDICINE

## 2024-05-29 PROCEDURE — 99386 PREV VISIT NEW AGE 40-64: CPT | Mod: 25 | Performed by: INTERNAL MEDICINE

## 2024-05-29 PROCEDURE — 83036 HEMOGLOBIN GLYCOSYLATED A1C: CPT | Performed by: INTERNAL MEDICINE

## 2024-05-29 PROCEDURE — 82306 VITAMIN D 25 HYDROXY: CPT | Performed by: INTERNAL MEDICINE

## 2024-05-29 PROCEDURE — 85027 COMPLETE CBC AUTOMATED: CPT | Performed by: INTERNAL MEDICINE

## 2024-05-29 PROCEDURE — 80061 LIPID PANEL: CPT | Performed by: INTERNAL MEDICINE

## 2024-05-29 PROCEDURE — 80053 COMPREHEN METABOLIC PANEL: CPT | Performed by: INTERNAL MEDICINE

## 2024-05-29 RX ORDER — AMMONIUM LACTATE 12 G/100G
CREAM TOPICAL 2 TIMES DAILY
Qty: 385 G | Refills: 1 | Status: SHIPPED | OUTPATIENT
Start: 2024-05-29

## 2024-05-29 RX ORDER — TRIAMCINOLONE ACETONIDE 1 MG/G
OINTMENT TOPICAL 2 TIMES DAILY
Qty: 30 G | Refills: 1 | Status: SHIPPED | OUTPATIENT
Start: 2024-05-29

## 2024-05-29 ASSESSMENT — PAIN SCALES - GENERAL: PAINLEVEL: NO PAIN (0)

## 2024-05-29 NOTE — PROGRESS NOTES
"Preventive Care Visit  [facility] JINNY Stahl MD, Internal Medicine  May 29, 2024      Assessment & Plan     (Z00.00) Routine general medical examination at a health care facility  (primary encounter diagnosis)  Comment: healthy habits, working on changing work schedule to better manage stress  Plan: Lipid panel reflex to direct LDL Non-fasting,         Comprehensive metabolic panel (BMP + Alb, Alk         Phos, ALT, AST, Total. Bili, TP), Hemoglobin         A1c, Vitamin D Deficiency            (L20.84) Intrinsic eczema  Comment: occasional flares on left ear  Plan: triamcinolone (KENALOG) 0.1 % external ointment            (L85.8) Keratosis pilaris  Plan: ammonium lactate (AMLACTIN) 12 % external cream            (Z11.59) Need for hepatitis C screening test  Comment: Routine screening.  Low risk.    Plan: Hepatitis C Screen Reflex to HCV RNA Quant and         Genotype                    BMI  Estimated body mass index is 32.8 kg/m  as calculated from the following:    Height as of this encounter: 1.612 m (5' 3.47\").    Weight as of this encounter: 85.2 kg (187 lb 14.4 oz).   Weight management plan: Discussed healthy diet and exercise guidelines    Counseling  Appropriate preventive services were discussed with this patient, including applicable screening as appropriate for fall prevention, nutrition, physical activity, Tobacco-use cessation, weight loss and cognition.  Checklist reviewing preventive services available has been given to the patient.  Reviewed patient's diet, addressing concerns and/or questions.   She is at risk for psychosocial distress and has been provided with information to reduce risk.           Quan Coley is a 39 year old, presenting for the following:  Physical        5/29/2024     9:48 AM   Additional Questions   Roomed by [facility]   Accompanied by self         5/29/2024     9:48 AM   Patient Reported Additional Medications   Patient reports taking the following " new medications none        Health Care Directive  Patient does not have a Health Care Directive or Living Will: Discussed advance care planning with patient; however, patient declined at this time.    ANNE Coley is here for a preventive health visit.  Overall, she is doing well with the following concerns:      Hurt her toe a few months ago and wondering if there is anything that needs to be done  Skin spot on her ankle, looks like an inflammed seb k that was frozen by dermatology a few years ago.   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 Working to change this. Has a 5 year old and 2.5 year old twins at home.                 5/28/2024   General Health   How would you rate your overall physical health? Good   Feel stress (tense, anxious, or unable to sleep) Only a little   (!) STRESS CONCERN      5/28/2024   Nutrition   Three or more servings of calcium each day? Yes   Diet: Regular (no restrictions)   How many servings of fruit and vegetables per day? (!) 2-3   How many sweetened beverages each day? (!) 2         5/28/2024   Exercise   Days per week of moderate/strenous exercise 0 days   Average minutes spent exercising at this level 0 min   (!) EXERCISE CONCERN      5/28/2024   Social Factors   Frequency of gathering with friends or relatives Once a week   Worry food won't last until get money to buy more No   Food not last or not have enough money for food? No   Do you have housing?  Yes   Are you worried about losing your housing? No   Lack of transportation? No   Unable to get utilities (heat,electricity)? No         5/28/2024   Dental   Dentist two times every year? Yes         5/28/2024   TB Screening   Were you born outside of the [place]? No         Today's PHQ-2 Score:       5/28/2024    11:26 PM   PHQ-2 ( 1999 Pfizer)   Q1: Little interest or pleasure in doing things 0   Q2: Feeling down, depressed or hopeless 0   PHQ-2 Score 0   Q1: Little interest or pleasure in doing things  Not at all   Q2: Feeling down, depressed or hopeless Not at all   PHQ-2 Score 0           2024   Substance Use   Alcohol more than 3/day or more than 7/wk No   Do you use any other substances recreationally? No     Social History     Tobacco Use    Smoking status: Former     Current packs/day: 0.00     Average packs/day: 0.5 packs/day for 11.5 years (5.8 ttl pk-yrs)     Types: Cigarettes     Start date: 2006     Quit date: 2017     Years since quittin.4     Passive exposure: Never    Smokeless tobacco: Never    Tobacco comments:     2018   Vaping Use    Vaping status: Never Used   Substance Use Topics    Alcohol use: Yes     Alcohol/week: 1.0 - 3.0 standard drink of alcohol    Drug use: No           3/12/2024   LAST FHS-7 RESULTS   1st degree relative breast or ovarian cancer No   Any relative bilateral breast cancer No   Any male have breast cancer No   Any ONE woman have BOTH breast AND ovarian cancer No   Any woman with breast cancer before 50yrs No   2 or more relatives with breast AND/OR ovarian cancer No   2 or more relatives with breast AND/OR bowel cancer No                2024   STI Screening   New sexual partner(s) since last STI/HIV test? No     History of abnormal Pap smear: No - age 30- 64 PAP with HPV every 5 years recommended        Latest Ref Rng & Units 2021    10:08 AM 2017    10:10 AM 2017    12:00 AM   PAP / HPV   PAP  Negative for Intraepithelial Lesion or Malignancy (NILM)      PAP (Historical)    NIL    HPV 16 DNA Negative Negative  Negative     HPV 18 DNA Negative Negative  Negative     Other HR HPV Negative Negative  Negative       ASCVD Risk   The ASCVD Risk score (Sharmin CARDOSO, et al., 2019) failed to calculate for the following reasons:    Cannot find a previous HDL lab    Cannot find a previous total cholesterol lab        2024   Contraception/Family Planning   Questions about contraception or family planning No        Reviewed and  "updated as needed this visit by Provider   Tobacco  Allergies  Meds  Problems  Med Hx  Surg Hx  Fam Hx                     Objective    Exam  /70 (BP Location: Right arm, Patient Position: Sitting, Cuff Size: Adult Large)   Pulse 90   Temp 98.7  F (37.1  C) (Tympanic)   Resp 16   Ht 1.612 m (5' 3.47\")   Wt 85.2 kg (187 lb 14.4 oz)   LMP 05/16/2024 (Within Days)   SpO2 98%   BMI 32.80 kg/m     Estimated body mass index is 32.8 kg/m  as calculated from the following:    Height as of this encounter: 1.612 m (5' 3.47\").    Weight as of this encounter: 85.2 kg (187 lb 14.4 oz).    Physical Exam  GENERAL: alert and no distress  EYES: Eyes grossly normal to inspection, PERRL and conjunctivae and sclerae normal  HENT: ear canals and TM's normal, nose and mouth without ulcers or lesions  NECK: no adenopathy, no asymmetry, masses, or scars  RESP: lungs clear to auscultation - no rales, rhonchi or wheezes  CV: regular rate and rhythm, normal S1 S2, no S3 or S4, no murmur, click or rub, no peripheral edema  MS: no gross musculoskeletal defects noted, no edema  SKIN: no suspicious lesions or rashes  NEURO: Normal strength and tone, mentation intact and speech normal  PSYCH: mentation appears normal, affect normal/bright        Signed Electronically by: Becka Stahl MD    "

## 2024-05-29 NOTE — PATIENT INSTRUCTIONS
"Preventive Care Advice   This is general advice we often give to help people stay healthy. Your care team may have specific advice just for you. Please talk to your care team about your own preventive care needs.  Lifestyle  Exercise at least 150 minutes each week (30 minutes a day, 5 days a week).  Do muscle strengthening activities 2 days a week. These help control your weight and prevent disease.  No smoking.  Wear sunscreen to prevent skin cancer.  Have your home tested for radon every 2 to 5 years. Radon is a colorless, odorless gas that can harm your lungs. To learn more, go to www.health.[facility].mn. and search for \"Radon in Homes.\"  Keep guns unloaded and locked up in a safe place like a safe or gun vault, or, use a gun lock and hide the keys. Always lock away bullets separately. To learn more, visit Know[place]em.[place].gov and search for \"safe gun storage.\"  Nutrition  Eat 5 or more servings of fruits and vegetables each day.  Try wheat bread, brown rice and whole grain pasta (instead of white bread, rice, and pasta).  Get enough calcium and vitamin D. Check the label on foods and aim for 100% of the RDA (recommended daily allowance).  Regular exams  Have a dental exam and cleaning every 6 months.  See your health care team every year to talk about:  Any changes in your health.  Any medicines your care team has prescribed.  Preventive care, family planning, and ways to prevent chronic diseases.  Shots (vaccines)   HPV shots (up to age 26), if you've never had them before.  Hepatitis B shots (up to age 59), if you've never had them before.  COVID-19 shot: Get this shot when it's due.  Flu shot: Get a flu shot every year.  Tetanus shot: Get a tetanus shot every 10 years.  Pneumococcal, hepatitis A, and RSV shots: Ask your care team if you need these based on your risk.  Shingles shot (for age 50 and up).  General health tests  Diabetes screening:  Starting at age 35, Get screened for diabetes at least every 3 years.  If " you are younger than age 35, ask your care team if you should be screened for diabetes.  Cholesterol test: At age 39, start having a cholesterol test every 5 years, or more often if advised.  Bone density scan (DEXA): At age 50, ask your care team if you should have this scan for osteoporosis (brittle bones).  Hepatitis C: Get tested at least once in your life.  Abdominal aortic aneurysm screening: Talk to your doctor about having this screening if you:  Have ever smoked; and  Are biologically male; and  Are between the ages of 65 and 75.  STIs (sexually transmitted infections)  Before age 24: Ask your care team if you should be screened for STIs.  After age 24: Get screened for STIs if you're at risk. You are at risk for STIs (including HIV) if:  You are sexually active with more than one person.  You don't use condoms every time.  You or a partner was diagnosed with a sexually transmitted infection.  If you are at risk for HIV, ask about PrEP medicine to prevent HIV.  Get tested for HIV at least once in your life, whether you are at risk for HIV or not.  Cancer screening tests  Cervical cancer screening: If you have a cervix, begin getting regular cervical cancer screening tests at age 21. Most people who have regular screenings with normal results can stop after age 65. Talk about this with your provider.  Breast cancer scan (mammogram): If you've ever had breasts, begin having regular mammograms starting at age 40. This is a scan to check for breast cancer.  Colon cancer screening: It is important to start screening for colon cancer at age 45.  Have a colonoscopy test every 10 years (or more often if you're at risk) Or, ask your provider about stool tests like a FIT test every year or Cologuard test every 3 years.  To learn more about your testing options, visit: [email or web address].  For help making a decision, visit: leo/ca87049.  Prostate cancer screening test: If you have a prostate and are age 55  to 69, ask your provider if you would benefit from a yearly prostate cancer screening test.  Lung cancer screening: If you are a current or former smoker age 50 to 80, ask your care team if ongoing lung cancer screenings are right for you.  For informational purposes only. Not to replace the advice of your health care provider. Copyright   2023 [employer]. All rights reserved. Clinically reviewed by the [facility] Transitions Program. [employer] [id number] - REV 04/24.    Learning About Stress  What is stress?     Stress is your body's response to a hard situation. Your body can have a physical, emotional, or mental response. Stress is a fact of life for most people, and it affects everyone differently. What causes stress for you may not be stressful for someone else.  A lot of things can cause stress. You may feel stress when you go on a job interview, take a test, or run a race. This kind of short-term stress is normal and even useful. It can help you if you need to work hard or react quickly. For example, stress can help you finish an important job on time.  Long-term stress is caused by ongoing stressful situations or events. Examples of long-term stress include long-term health problems, ongoing problems at work, or conflicts in your family. Long-term stress can harm your health.  How does stress affect your health?  When you are stressed, your body responds as though you are in danger. It makes hormones that speed up your heart, make you breathe faster, and give you a burst of energy. This is called the fight-or-flight stress response. If the stress is over quickly, your body goes back to normal and no harm is done.  But if stress happens too often or lasts too long, it can have bad effects. Long-term stress can make you more likely to get sick, and it can make symptoms of some diseases worse. If you tense up when you are stressed, you may develop neck, shoulder, or low back pain. Stress is  linked to high blood pressure and heart disease.  Stress also harms your emotional health. It can make you berrios, tense, or depressed. Your relationships may suffer, and you may not do well at work or school.  What can you do to manage stress?  You can try these things to help manage stress:   Do something active. Exercise or activity can help reduce stress. Walking is a great way to get started. Even everyday activities such as housecleaning or yard work can help.  Try yoga or seven chi. These techniques combine exercise and meditation. You may need some training at first to learn them.  Do something you enjoy. For example, listen to music or go to a movie. Practice your hobby or do volunteer work.  Meditate. This can help you relax, because you are not worrying about what happened before or what may happen in the future.  Do guided imagery. Imagine yourself in any setting that helps you feel calm. You can use online videos, books, or a teacher to guide you.  Do breathing exercises. For example:  From a standing position, bend forward from the waist with your knees slightly bent. Let your arms dangle close to the floor.  Breathe in slowly and deeply as you return to a standing position. Roll up slowly and lift your head last.  Hold your breath for just a few seconds in the standing position.  Breathe out slowly and bend forward from the waist.  Let your feelings out. Talk, laugh, cry, and express anger when you need to. Talking with supportive friends or family, a counselor, or a minda leader about your feelings is a healthy way to relieve stress. Avoid discussing your feelings with people who make you feel worse.  Write. It may help to write about things that are bothering you. This helps you find out how much stress you feel and what is causing it. When you know this, you can find better ways to cope.  What can you do to prevent stress?  You might try some of these things to help prevent stress:  Manage your time.  "This helps you find time to do the things you want and need to do.  Get enough sleep. Your body recovers from the stresses of the day while you are sleeping.  Get support. Your family, friends, and community can make a difference in how you experience stress.  Limit your news feed. Avoid or limit time on social media or news that may make you feel stressed.  Do something active. Exercise or activity can help reduce stress. Walking is a great way to get started.  Where can you learn more?  Go to https://www.[employer].net/patiented  Enter N032 in the search box to learn more about \"Learning About Stress.\"  Current as of: October 24, 2023               Content Version: 14.0    4859-0847 [employer].   Care instructions adapted under license by your healthcare professional. If you have questions about a medical condition or this instruction, always ask your healthcare professional. [employer] disclaims any warranty or liability for your use of this information.      "

## 2024-05-30 LAB — HCV AB SERPL QL IA: NONREACTIVE

## 2024-07-31 NOTE — PLAN OF CARE
Patient reporting adequate pain control with Ibuprofen and tylenol, has not used oxicodone this shift. Passing flatus, has not had a BM. Pt to begin using electric breast pump. Has been doing hand expression with each feeding.   The skin at the access site was anesthetized. Using a micropuncture needle with the Seldinger technique the left femoral vein was succesfully accessed times 1 using a INTRODUCER SHTH 5FR 50CM 11CM GRAY HUB RICHARD OLMOS.

## 2024-08-23 ENCOUNTER — E-VISIT (OUTPATIENT)
Dept: PEDIATRICS | Facility: CLINIC | Age: 39
End: 2024-08-23
Payer: COMMERCIAL

## 2024-08-23 DIAGNOSIS — B35.1 ONYCHOMYCOSIS: Primary | ICD-10-CM

## 2024-08-23 PROCEDURE — 99421 OL DIG E/M SVC 5-10 MIN: CPT | Performed by: INTERNAL MEDICINE

## 2024-08-26 RX ORDER — PRENATAL VIT 91/IRON/FOLIC/DHA 28-975-200
COMBINATION PACKAGE (EA) ORAL 2 TIMES DAILY
Qty: 42 G | Refills: 11 | Status: SHIPPED | OUTPATIENT
Start: 2024-08-26

## 2024-08-26 RX ORDER — CICLOPIROX 80 MG/ML
SOLUTION TOPICAL
Qty: 6.6 ML | Refills: 11 | Status: SHIPPED | OUTPATIENT
Start: 2024-08-26

## 2025-03-18 ENCOUNTER — ANCILLARY PROCEDURE (OUTPATIENT)
Dept: MAMMOGRAPHY | Facility: CLINIC | Age: 40
End: 2025-03-18
Attending: INTERNAL MEDICINE
Payer: COMMERCIAL

## 2025-03-18 DIAGNOSIS — Z12.31 VISIT FOR SCREENING MAMMOGRAM: ICD-10-CM

## 2025-03-18 PROCEDURE — 77067 SCR MAMMO BI INCL CAD: CPT | Mod: TC | Performed by: RADIOLOGY

## 2025-03-18 PROCEDURE — 77063 BREAST TOMOSYNTHESIS BI: CPT | Mod: TC | Performed by: RADIOLOGY

## 2025-04-29 ENCOUNTER — PATIENT OUTREACH (OUTPATIENT)
Dept: CARE COORDINATION | Facility: CLINIC | Age: 40
End: 2025-04-29
Payer: COMMERCIAL

## 2025-05-13 ENCOUNTER — PATIENT OUTREACH (OUTPATIENT)
Dept: CARE COORDINATION | Facility: CLINIC | Age: 40
End: 2025-05-13
Payer: COMMERCIAL

## 2025-07-05 ENCOUNTER — E-VISIT (OUTPATIENT)
Dept: URGENT CARE | Facility: CLINIC | Age: 40
End: 2025-07-05
Payer: COMMERCIAL

## 2025-07-05 DIAGNOSIS — R19.5 LOOSE STOOLS: Primary | ICD-10-CM

## 2025-07-05 PROCEDURE — 99207 PR NON-BILLABLE SERV PER CHARTING: CPT | Performed by: FAMILY MEDICINE

## 2025-07-05 NOTE — PATIENT INSTRUCTIONS
Thank you for choosing us for your care. Based on your symptoms and length of illness, I do not think that you need an antibiotic prescription at this time.  Please follow the care advise I've provided and use the prescribed medication to help relieve your symptoms. View your full visit summary for details by clicking on the link below.     If you're not feeling better within  2-3days, please respond to this message and we can consider if an antibiotic prescription is needed.  You can schedule an appointment right here in [facility], or call [phone]  If the visit is for the same symptoms as your eVisit, we'll refund the cost of your eVisit if seen within seven days   Diarrhea: Care Instructions  Overview    Diarrhea is loose, watery stools (bowel movements). The exact cause is often hard to find. Sometimes diarrhea is your body's way of getting rid of what caused an upset stomach. Viruses, food poisoning, and many medicines can cause diarrhea. Some people get diarrhea in response to emotional stress, anxiety, or certain foods.  Almost everyone has diarrhea now and then. It usually isn't serious, and your stools will return to normal soon. The important thing to do is replace the fluids you have lost, so you can prevent dehydration.  The doctor has checked you carefully, but problems can develop later. If you notice any problems or new symptoms, get medical treatment right away .  Follow-up care is a key part of your treatment and safety. Be sure to make and go to all appointments, and call your doctor if you are having problems. It's also a good idea to know your test results and keep a list of the medicines you take.  How can you care for yourself at home?  Watch for signs of dehydration, which means your body has lost too much water. Dehydration is a serious condition and should be treated right away. Signs of dehydration are:  Increasing thirst and dry eyes and mouth.  Feeling faint or lightheaded.  A smaller  "amount of urine than normal.  To prevent dehydration, drink plenty of fluids. Choose water and other clear liquids until you feel better. If you have kidney, heart, or liver disease and have to limit fluids, talk with your doctor before you increase the amount of fluids you drink.  When you feel like eating, start with small amounts of food. You can try eating a snack or small meal every 3 to 4 hours.  Limit foods that contain sugar, lactose, fructose, high-fructose corn syrup, and sorbitol. Avoid spicy foods if they make your diarrhea worse. And avoid caffeine.  The doctor may recommend that you take over-the-counter medicine, such as loperamide (Imodium). Read and follow all instructions on the label. Do not use this medicine if you have bloody diarrhea, a high fever, or other signs of serious illness. Call your doctor if you think you are having a problem with your medicine.  When should you call for help?  Call 911 anytime you think you may need emergency care. For example, call if:  You passed out (lost consciousness).  Your stools are maroon or very bloody.  Contact your doctor now or seek immediate medical care if:  You are dizzy or lightheaded, or you feel like you may faint.  Your stools are black and look like tar, are pale or white, or have streaks of blood.  You have new or worse belly pain.  You have symptoms of dehydration, such as:  Dry eyes and a dry mouth.  Passing only a little urine.  Cannot keep fluids down.  You have a new or higher fever.  Watch closely for changes in your health, and be sure to contact your doctor if:  Your diarrhea is getting worse.  You see pus in the diarrhea.  You are not getting better after 2 days (48 hours).  Where can you learn more?  Go to https://www.[employer].net/patiented  Enter W335 in the search box to learn more about \"Diarrhea: Care Instructions.\"  Current as of: October 19, 2024  Content Version: 14.5 2024-2025 [employer].   Care instructions " adapted under license by your healthcare professional. If you have questions about a medical condition or this instruction, always ask your healthcare professional. [employer] disclaims any warranty or liability for your use of this information.

## 2025-07-07 SDOH — HEALTH STABILITY: PHYSICAL HEALTH: ON AVERAGE, HOW MANY DAYS PER WEEK DO YOU ENGAGE IN MODERATE TO STRENUOUS EXERCISE (LIKE A BRISK WALK)?: 0 DAYS

## 2025-07-07 SDOH — HEALTH STABILITY: PHYSICAL HEALTH: ON AVERAGE, HOW MANY MINUTES DO YOU ENGAGE IN EXERCISE AT THIS LEVEL?: 0 MIN

## 2025-07-07 ASSESSMENT — SOCIAL DETERMINANTS OF HEALTH (SDOH): HOW OFTEN DO YOU GET TOGETHER WITH FRIENDS OR RELATIVES?: ONCE A WEEK

## 2025-07-10 ENCOUNTER — OFFICE VISIT (OUTPATIENT)
Dept: FAMILY MEDICINE | Facility: CLINIC | Age: 40
End: 2025-07-10
Payer: COMMERCIAL

## 2025-07-10 VITALS
RESPIRATION RATE: 12 BRPM | DIASTOLIC BLOOD PRESSURE: 77 MMHG | HEIGHT: 63 IN | SYSTOLIC BLOOD PRESSURE: 122 MMHG | TEMPERATURE: 98 F | HEART RATE: 81 BPM | WEIGHT: 177.5 LBS | BODY MASS INDEX: 31.45 KG/M2 | OXYGEN SATURATION: 98 %

## 2025-07-10 DIAGNOSIS — Z13.220 LIPID SCREENING: ICD-10-CM

## 2025-07-10 DIAGNOSIS — Z00.00 ROUTINE GENERAL MEDICAL EXAMINATION AT A HEALTH CARE FACILITY: Primary | ICD-10-CM

## 2025-07-10 DIAGNOSIS — Z13.1 SCREENING FOR DIABETES MELLITUS: ICD-10-CM

## 2025-07-10 DIAGNOSIS — Z13.29 SCREENING FOR THYROID DISORDER: ICD-10-CM

## 2025-07-10 PROBLEM — Z98.891 S/P CESAREAN SECTION: Status: RESOLVED | Noted: 2019-01-21 | Resolved: 2025-07-10

## 2025-07-10 PROBLEM — Z36.89 ENCOUNTER FOR TRIAGE IN PREGNANT PATIENT: Status: RESOLVED | Noted: 2021-11-02 | Resolved: 2025-07-10

## 2025-07-10 PROBLEM — O30.042 DICHORIONIC DIAMNIOTIC TWIN PREGNANCY IN SECOND TRIMESTER: Status: RESOLVED | Noted: 2021-07-21 | Resolved: 2025-07-10

## 2025-07-10 LAB
BASOPHILS # BLD AUTO: 0 10E3/UL (ref 0–0.2)
BASOPHILS NFR BLD AUTO: 0 %
EOSINOPHIL # BLD AUTO: 0.2 10E3/UL (ref 0–0.7)
EOSINOPHIL NFR BLD AUTO: 2 %
ERYTHROCYTE [DISTWIDTH] IN BLOOD BY AUTOMATED COUNT: 12.4 % (ref 10–15)
EST. AVERAGE GLUCOSE BLD GHB EST-MCNC: 111 MG/DL
HBA1C MFR BLD: 5.5 % (ref 0–5.6)
HCT VFR BLD AUTO: 39.5 % (ref 35–47)
HGB BLD-MCNC: 13.1 G/DL (ref 11.7–15.7)
IMM GRANULOCYTES # BLD: 0 10E3/UL
IMM GRANULOCYTES NFR BLD: 0 %
LYMPHOCYTES # BLD AUTO: 2.2 10E3/UL (ref 0.8–5.3)
LYMPHOCYTES NFR BLD AUTO: 23 %
MCH RBC QN AUTO: 28.9 PG (ref 26.5–33)
MCHC RBC AUTO-ENTMCNC: 33.2 G/DL (ref 31.5–36.5)
MCV RBC AUTO: 87 FL (ref 78–100)
MONOCYTES # BLD AUTO: 0.6 10E3/UL (ref 0–1.3)
MONOCYTES NFR BLD AUTO: 6 %
NEUTROPHILS # BLD AUTO: 6.8 10E3/UL (ref 1.6–8.3)
NEUTROPHILS NFR BLD AUTO: 69 %
PLATELET # BLD AUTO: 292 10E3/UL (ref 150–450)
RBC # BLD AUTO: 4.53 10E6/UL (ref 3.8–5.2)
WBC # BLD AUTO: 9.8 10E3/UL (ref 4–11)

## 2025-07-10 NOTE — PATIENT INSTRUCTIONS
Patient Education   Preventive Care Advice   This is general advice given by our system to help you stay healthy. However, your care team may have specific advice just for you. Please talk to your care team about your preventive care needs.  Nutrition  Eat 5 or more servings of fruits and vegetables each day.  Try wheat bread, brown rice and whole grain pasta (instead of white bread, rice, and pasta).  Get enough calcium and vitamin D. Check the label on foods and aim for 100% of the RDA (recommended daily allowance).  Lifestyle  Exercise at least 150 minutes each week  (30 minutes a day, 5 days a week).  Do muscle strengthening activities 2 days a week. These help control your weight and prevent disease.  No smoking.  Wear sunscreen to prevent skin cancer.  Have a dental exam and cleaning every 6 months.  Yearly exams  See your health care team every year to talk about:  Any changes in your health.  Any medicines your care team has prescribed.  Preventive care, family planning, and ways to prevent chronic diseases.  Shots (vaccines)   HPV shots (up to age 26), if you've never had them before.  Hepatitis B shots (up to age 59), if you've never had them before.  COVID-19 shot: Get this shot when it's due.  Flu shot: Get a flu shot every year.  Tetanus shot: Get a tetanus shot every 10 years.  Pneumococcal, hepatitis A, and RSV shots: Ask your care team if you need these based on your risk.  Shingles shot (for age 50 and up)  General health tests  Diabetes screening:  Starting at age 35, Get screened for diabetes at least every 3 years.  If you are younger than age 35, ask your care team if you should be screened for diabetes.  Cholesterol test: At age 39, start having a cholesterol test every 5 years, or more often if advised.  Bone density scan (DEXA): At age 50, ask your care team if you should have this scan for osteoporosis (brittle bones).  Hepatitis C: Get tested at least once in your life.  STIs (sexually  transmitted infections)  Before age 24: Ask your care team if you should be screened for STIs.  After age 24: Get screened for STIs if you're at risk. You are at risk for STIs (including HIV) if:  You are sexually active with more than one person.  You don't use condoms every time.  You or a partner was diagnosed with a sexually transmitted infection.  If you are at risk for HIV, ask about PrEP medicine to prevent HIV.  Get tested for HIV at least once in your life, whether you are at risk for HIV or not.  Cancer screening tests  Cervical cancer screening: If you have a cervix, begin getting regular cervical cancer screening tests starting at age 21.  Breast cancer scan (mammogram): If you've ever had breasts, begin having regular mammograms starting at age 40. This is a scan to check for breast cancer.  Colon cancer screening: It is important to start screening for colon cancer at age 45.  Have a colonoscopy test every 10 years (or more often if you're at risk) Or, ask your provider about stool tests like a FIT test every year or Cologuard test every 3 years.  To learn more about your testing options, visit:   .  For help making a decision, visit:   https://bit.ly/au51730.  Prostate cancer screening test: If you have a prostate, ask your care team if a prostate cancer screening test (PSA) at age 55 is right for you.  Lung cancer screening: If you are a current or former smoker ages 50 to 80, ask your care team if ongoing lung cancer screenings are right for you.  For informational purposes only. Not to replace the advice of your health care provider. Copyright   2023 [facility]. All rights reserved. Clinically reviewed by the [facility] Transitions Program. [employer] [id number] - REV 01/24.  Learning About Stress  What is stress?     Stress is your body's response to a hard situation. Your body can have a physical, emotional, or mental response. Stress is a fact of life for most people, and it  affects everyone differently. What causes stress for you may not be stressful for someone else.  A lot of things can cause stress. You may feel stress when you go on a job interview, take a test, or run a race. This kind of short-term stress is normal and even useful. It can help you if you need to work hard or react quickly. For example, stress can help you finish an important job on time.  Long-term stress is caused by ongoing stressful situations or events. Examples of long-term stress include long-term health problems, ongoing problems at work, or conflicts in your family. Long-term stress can harm your health.  How does stress affect your health?  When you are stressed, your body responds as though you are in danger. It makes hormones that speed up your heart, make you breathe faster, and give you a burst of energy. This is called the fight-or-flight stress response. If the stress is over quickly, your body goes back to normal and no harm is done.  But if stress happens too often or lasts too long, it can have bad effects. Long-term stress can make you more likely to get sick, and it can make symptoms of some diseases worse. If you tense up when you are stressed, you may develop neck, shoulder, or low back pain. Stress is linked to high blood pressure and heart disease.  Stress also harms your emotional health. It can make you berrios, tense, or depressed. Your relationships may suffer, and you may not do well at work or school.  What can you do to manage stress?  You can try these things to help manage stress:   Do something active. Exercise or activity can help reduce stress. Walking is a great way to get started. Even everyday activities such as housecleaning or yard work can help.  Try yoga or seven chi. These techniques combine exercise and meditation. You may need some training at first to learn them.  Do something you enjoy. For example, listen to music or go to a movie. Practice your hobby or do volunteer  "work.  Meditate. This can help you relax, because you are not worrying about what happened before or what may happen in the future.  Do guided imagery. Imagine yourself in any setting that helps you feel calm. You can use online videos, books, or a teacher to guide you.  Do breathing exercises. For example:  From a standing position, bend forward from the waist with your knees slightly bent. Let your arms dangle close to the floor.  Breathe in slowly and deeply as you return to a standing position. Roll up slowly and lift your head last.  Hold your breath for just a few seconds in the standing position.  Breathe out slowly and bend forward from the waist.  Let your feelings out. Talk, laugh, cry, and express anger when you need to. Talking with supportive friends or family, a counselor, or a minda leader about your feelings is a healthy way to relieve stress. Avoid discussing your feelings with people who make you feel worse.  Write. It may help to write about things that are bothering you. This helps you find out how much stress you feel and what is causing it. When you know this, you can find better ways to cope.  What can you do to prevent stress?  You might try some of these things to help prevent stress:  Manage your time. This helps you find time to do the things you want and need to do.  Get enough sleep. Your body recovers from the stresses of the day while you are sleeping.  Get support. Your family, friends, and community can make a difference in how you experience stress.  Limit your news feed. Avoid or limit time on social media or news that may make you feel stressed.  Do something active. Exercise or activity can help reduce stress. Walking is a great way to get started.  Where can you learn more?  Go to https://www.[employer].net/patiented  Enter N032 in the search box to learn more about \"Learning About Stress.\"  Current as of: October 24, 2024  Content Version: 14.5 2024-2025 [employer].   Care instructions adapted under license by your healthcare professional. If you have questions about a medical condition or this instruction, always ask your healthcare professional. [employer] disclaims any warranty or liability for your use of this information.

## 2025-07-10 NOTE — PROGRESS NOTES
"Preventive Care Visit  [facility]  Pamela GAMANDA Moseley CNP, Family Medicine  Jul 10, 2025      Assessment & Plan     Routine general medical examination at a health care facility  Reviewed age and gender appropriate screenings and lifestyle modifications with patient.     Lipid screening  Screening, goal ASCVD < 10%  - Lipid panel reflex to direct LDL Non-fasting  - Comprehensive metabolic panel (BMP + Alb, Alk Phos, ALT, AST, Total. Bili, TP)    Screening for diabetes mellitus  Screening  - Hemoglobin A1c  - Comprehensive metabolic panel (BMP + Alb, Alk Phos, ALT, AST, Total. Bili, TP)  - CBC with platelets and differential    Screening for thyroid disorder  Asymptomatic but has some family history so requesting screening.   - TSH with free T4 reflex        BMI  Estimated body mass index is 31.44 kg/m  as calculated from the following:    Height as of this encounter: 1.6 m (5' 3\").    Weight as of this encounter: 80.5 kg (177 lb 8 oz).       Counseling  Appropriate preventive services were addressed with this patient via screening, questionnaire, or discussion as appropriate for fall prevention, nutrition, physical activity, Tobacco-use cessation, social engagement, weight loss and cognition.  Checklist reviewing preventive services available has been given to the patient.  Reviewed patient's diet, addressing concerns and/or questions.   She is at risk for psychosocial distress and has been provided with information to reduce risk.           Quan Coley is a 40 year old, presenting for the following:  Physical (Annual Physical )        7/10/2025     1:10 PM   Additional Questions   Roomed by Chelsey Valle, age 40, female  - Over the past year, recurrent episodes of left armpit swelling and tenderness, associated with feeling unwell, more frequent since having young children  - Concern about possible lymphoma due to palpable, hard, swollen lymph node in " left armpit  - History of abnormal PAP smear, last PAP in 2021, no history of abnormal mammogram, last mammogram March 2025  - Occasional hemorrhoids, worse with loose stools, rare episodes of loose stools, hemorrhoids present since pregnancy, symptoms include swelling and itching, no history of prolapse or bleeding  - Recent episode of stomach upset in the past week, possibly related to change in coffee routine  - Reports feeling somewhat sluggish, no prior thyroid testing  - Not currently pregnant        Advance Care Planning    Discussed advance care planning with patient; however, patient declined at this time.        7/7/2025   General Health   How would you rate your overall physical health? Good   Feel stress (tense, anxious, or unable to sleep) To some extent   (!) STRESS CONCERN      7/7/2025   Nutrition   Three or more servings of calcium each day? (!) I DON'T KNOW   Diet: Regular (no restrictions)   How many servings of fruit and vegetables per day? (!) 2-3   How many sweetened beverages each day? (!) 3         7/7/2025   Exercise   Days per week of moderate/strenous exercise 0 days   Average minutes spent exercising at this level 0 min   (!) EXERCISE CONCERN      7/7/2025   Social Factors   Frequency of gathering with friends or relatives Once a week   Worry food won't last until get money to buy more No   Food not last or not have enough money for food? No   Do you have housing? (Housing is defined as stable permanent housing and does not include staying outside in a car, in a tent, in an abandoned building, in an overnight shelter, or couch-surfing.) Yes   Are you worried about losing your housing? No   Lack of transportation? No   Unable to get utilities (heat,electricity)? No         7/7/2025   Dental   Dentist two times every year? Yes         Today's PHQ-2 Score:       7/10/2025     1:08 PM   PHQ-2 ( 1999 [employer])   Q1: Little interest or pleasure in doing things 0   Q2: Feeling down, depressed or  hopeless 0   PHQ-2 Score 0    Q1: Little interest or pleasure in doing things Not at all   Q2: Feeling down, depressed or hopeless Not at all   PHQ-2 Score 0       Patient-reported           2025   Substance Use   Alcohol more than 3/day or more than 7/wk No   Do you use any other substances recreationally? No     Social History     Tobacco Use    Smoking status: Former     Current packs/day: 0.00     Average packs/day: 0.5 packs/day for 11.5 years (5.8 ttl pk-yrs)     Types: Cigarettes     Start date: 2006     Quit date: 2017     Years since quittin.6     Passive exposure: Never    Smokeless tobacco: Never    Tobacco comments:     2018   Vaping Use    Vaping status: Never Used   Substance Use Topics    Alcohol use: Yes     Alcohol/week: 1.0 - 3.0 standard drink of alcohol    Drug use: No           3/18/2025   LAST FHS-7 RESULTS   1st degree relative breast or ovarian cancer No   Any relative bilateral breast cancer No   Any male have breast cancer No   Any ONE woman have BOTH breast AND ovarian cancer No   Any woman with breast cancer before 50yrs No   2 or more relatives with breast AND/OR ovarian cancer No   2 or more relatives with breast AND/OR bowel cancer No        Mammogram Screening - Mammogram every 1-2 years updated in Health Maintenance based on mutual decision making        2025   STI Screening   New sexual partner(s) since last STI/HIV test? No     History of abnormal Pap smear: No - age 30- 64 PAP with HPV every 5 years recommended        Latest Ref Rng & Units 2021    10:08 AM 2017    10:10 AM 2017    12:00 AM   PAP / HPV   PAP  Negative for Intraepithelial Lesion or Malignancy (NILM)      PAP (Historical)    NIL    HPV 16 DNA Negative Negative  Negative     HPV 18 DNA Negative Negative  Negative     Other HR HPV Negative Negative  Negative       ASCVD Risk   The 10-year ASCVD risk score (Sharmin CARDOSO, et al., 2019) is: 0.5%    Values used to calculate the  score:      Age: 40 years      Sex: Female      Is Non- : No      Diabetic: No      Tobacco smoker: No      Systolic Blood Pressure: 122 mmHg      Is BP treated: No      HDL Cholesterol: 61 mg/dL      Total Cholesterol: 199 mg/dL        2025   Contraception/Family Planning   Questions about contraception or family planning No   What are your periods like? Regular        Reviewed and updated as needed this visit by Provider   Tobacco  Allergies  Meds  Problems  Med Hx  Surg Hx  Fam Hx            Past Medical History:   Diagnosis Date    Allergic rhinitis     Depressive disorder     Diabetes (H)     GDM diet    Dichorionic diamniotic twin pregnancy in second trimester 2021    Encounter for triage in pregnant patient 2021    Hematuria 2017    History of human papillomavirus infection 2004    I had 1 genital wart removed and have had no issues since.    Ruptured ear drum, right     S/P  section 2019    Urinary tract infection     Varicella     Venereal warts      Past Surgical History:   Procedure Laterality Date     SECTION N/A 2019    Procedure:  SECTION;  Surgeon: Dotty España MD;  Location: [facility] L[facility]     SECTION N/A 2021    Procedure:  SECTION;  Surgeon: Edda Mcneal DO;  Location: [facility]    GYN SURGERY      NM DESTRUCT BENIGN LESION, UP TO 14  2004    TONSILLECTOMY      2002     OB History    Para Term  AB Living   2 2 1 1 0 3   SAB IAB Ectopic Multiple Live Births   0 0 0 1 3      # Outcome Date GA Lbr Jeronimo/2nd Weight Sex Type Anes PTL Lv   2A  20 35w3d  1.85 kg (4 lb 1.3 oz) M CS-LTranv Spinal Y JOSE      Name: Daniel      Apgar1: 9  Apgar5: 10   2B  21 35w3d  1.74 kg (3 lb 13.4 oz) F CS-LTranv  Y JOSE      Name: Brandy      Apgar1: 8  Apgar5: 9   1 Term 19 41w0d 28:25 3.062 kg (6 lb 12 oz) M CS-LTranv EPI N JOSE      Complications:  "Failure to Progress in Second Stage, Fetal Intolerance      Name: Jules      Apgar1: 9  Apgar5: 9        ROS: 10 point ROS neg other than the symptoms noted above in the HPI.       Objective    Exam  /77 (BP Location: Right arm, Patient Position: Sitting, Cuff Size: Adult Regular)   Pulse 81   Temp 98  F (36.7  C) (Oral)   Resp 12   Ht 1.6 m (5' 3\")   Wt 80.5 kg (177 lb 8 oz)   LMP 06/27/2025 (Exact Date)   SpO2 98%   BMI 31.44 kg/m     Estimated body mass index is 31.44 kg/m  as calculated from the following:    Height as of this encounter: 1.6 m (5' 3\").    Weight as of this encounter: 80.5 kg (177 lb 8 oz).    Physical Exam  Vitals and nursing note reviewed.   Constitutional:       General: She is not in acute distress.     Appearance: Normal appearance. She is not ill-appearing, toxic-appearing or diaphoretic.   HENT:      Head: Normocephalic and atraumatic.      Right Ear: Tympanic membrane, ear canal and external ear normal.      Left Ear: Tympanic membrane, ear canal and external ear normal.      Nose: Nose normal.      Mouth/Throat:      Mouth: Mucous membranes are moist.      Pharynx: No oropharyngeal exudate or posterior oropharyngeal erythema.   Eyes:      General: Lids are normal.      Extraocular Movements: Extraocular movements intact.      Conjunctiva/sclera: Conjunctivae normal.      Pupils: Pupils are equal, round, and reactive to light.   Neck:      Thyroid: No thyroid mass, thyromegaly or thyroid tenderness.   Cardiovascular:      Rate and Rhythm: Normal rate and regular rhythm.      Pulses: Normal pulses.      Heart sounds: Normal heart sounds.   Pulmonary:      Effort: Pulmonary effort is normal.      Breath sounds: Normal breath sounds.   Abdominal:      General: Abdomen is flat. Bowel sounds are normal.      Palpations: Abdomen is soft.      Tenderness: There is no abdominal tenderness.      Hernia: No hernia is present.   Musculoskeletal:         General: Normal range of motion. "      Cervical back: Normal range of motion and neck supple. No rigidity or tenderness.   Lymphadenopathy:      Cervical: No cervical adenopathy.   Skin:     General: Skin is warm and dry.      Capillary Refill: Capillary refill takes less than 2 seconds.   Neurological:      General: No focal deficit present.      Mental Status: She is alert.      Deep Tendon Reflexes: Reflexes are normal and symmetric.   Psychiatric:         Mood and Affect: Mood normal.         Speech: Speech normal.         Behavior: Behavior normal. Behavior is cooperative.         Thought Content: Thought content normal.         Judgment: Judgment normal.               Signed Electronically by: AMANDA Stapleton CNP

## (undated) DEVICE — GLOVE PROTEXIS BLUE W/NEU-THERA 6.5  2D73EB65

## (undated) DEVICE — STOCKING SLEEVE COMPRESSION CALF LG

## (undated) DEVICE — SOL NACL 0.9% IRRIG 1000ML BOTTLE 2F7124

## (undated) DEVICE — LINEN BABY BLANKET 5434

## (undated) DEVICE — SU MONOCRYL 0 CT-1 36" Y346H

## (undated) DEVICE — SOL WATER IRRIG 1000ML BOTTLE 2F7114

## (undated) DEVICE — SU MONOCRYL 4-0 PS-2 27" UND Y426H

## (undated) DEVICE — Device

## (undated) DEVICE — CATH TRAY FOLEY SURESTEP 16FR DRAIN BAG STATOCK A899916

## (undated) DEVICE — CAP BABY PINK/BLUE IC-2

## (undated) DEVICE — ESU GROUND PAD ADULT W/CORD E7507

## (undated) DEVICE — CATH TRAY FOLEY 16FR SILICONE 907416

## (undated) DEVICE — GLOVE ESTEEM POWDER FREE SMT 6.5  2D72PT65

## (undated) DEVICE — DRSG STERI STRIP 1/2X4" R1547

## (undated) DEVICE — DRSG TELFA 3X8" 1238

## (undated) DEVICE — PREP CHLORAPREP 26ML TINTED ORANGE  260815

## (undated) DEVICE — PREP CHLORAPREP 26ML TINTED HI-LITE ORANGE 930815

## (undated) DEVICE — SU MONOCRYL 0 CT 36" Y358H

## (undated) DEVICE — BASIN SET MAJOR

## (undated) DEVICE — SU VICRYL 4-0 PS-2 18" UND J496G

## (undated) DEVICE — GLOVE PROTEXIS POWDER FREE SMT 6.0  2D72PT60X

## (undated) DEVICE — SU VICRYL 0 CT-1 36" J346H

## (undated) DEVICE — STOCKING SLEEVE VASOPRESS COMPRESSION CALF MED 18" VP501M

## (undated) DEVICE — TRANSFER DEVICE BLOOD NDL HOLDER 364880

## (undated) DEVICE — PACK C-SECTION LF PL15OTA83B

## (undated) DEVICE — LINEN TOWEL PACK X10 5473

## (undated) DEVICE — LINEN FULL SHEET 5511

## (undated) DEVICE — SU MONOCRYL 3-0 SH 27" Y316H

## (undated) DEVICE — SU MONOCRYL 0 CTX 36" Y398H

## (undated) DEVICE — BAG CLEAR TRASH 1.3M 39X33" P4040C

## (undated) DEVICE — SOL WATER IRRIG 1000ML BOTTLE 07139-09

## (undated) DEVICE — BLADE CLIPPER SGL USE 9680

## (undated) DEVICE — STRAP KNEE/BODY 31143004

## (undated) DEVICE — SUCTION KIWI VAC  VAC-6000M

## (undated) DEVICE — PAD CHUX UNDERPAD 30X36" P3036C

## (undated) DEVICE — DRSG TEGADERM 4X10" 1627

## (undated) DEVICE — LINEN HALF SHEET 5512

## (undated) DEVICE — SU PDS II 0 CTX 60" Z990G

## (undated) DEVICE — BARRIER SEPRAFILM 5X6" SINGLE SHEET 4301-02

## (undated) DEVICE — LINEN DRAPE 54X72" 5467

## (undated) DEVICE — SUCTION CANISTER MEDIVAC LINER 1500ML W/LID 65651-515

## (undated) DEVICE — GLOVE PROTEXIS BLUE W/NEU-THERA 7.0  2D73EB70

## (undated) DEVICE — SOL NACL 0.9% IRRIG 1000ML BOTTLE 07138-09

## (undated) RX ORDER — ONDANSETRON 2 MG/ML
INJECTION INTRAMUSCULAR; INTRAVENOUS
Status: DISPENSED
Start: 2021-11-07

## (undated) RX ORDER — KETOROLAC TROMETHAMINE 30 MG/ML
INJECTION, SOLUTION INTRAMUSCULAR; INTRAVENOUS
Status: DISPENSED
Start: 2021-11-07

## (undated) RX ORDER — EPHEDRINE SULFATE 50 MG/ML
INJECTION, SOLUTION INTRAMUSCULAR; INTRAVENOUS; SUBCUTANEOUS
Status: DISPENSED
Start: 2021-11-07

## (undated) RX ORDER — FENTANYL CITRATE-0.9 % NACL/PF 10 MCG/ML
PLASTIC BAG, INJECTION (ML) INTRAVENOUS
Status: DISPENSED
Start: 2021-11-07

## (undated) RX ORDER — MORPHINE SULFATE 1 MG/ML
INJECTION, SOLUTION EPIDURAL; INTRATHECAL; INTRAVENOUS
Status: DISPENSED
Start: 2021-11-07

## (undated) RX ORDER — ACETAMINOPHEN 325 MG/1
TABLET ORAL
Status: DISPENSED
Start: 2019-01-21